# Patient Record
Sex: FEMALE | Race: WHITE | NOT HISPANIC OR LATINO | Employment: OTHER | URBAN - METROPOLITAN AREA
[De-identification: names, ages, dates, MRNs, and addresses within clinical notes are randomized per-mention and may not be internally consistent; named-entity substitution may affect disease eponyms.]

---

## 2017-01-20 ENCOUNTER — OFFICE VISIT (OUTPATIENT)
Dept: FAMILY MEDICINE CLINIC | Facility: CLINIC | Age: 67
End: 2017-01-20

## 2017-01-20 VITALS
BODY MASS INDEX: 26.04 KG/M2 | DIASTOLIC BLOOD PRESSURE: 75 MMHG | HEIGHT: 65 IN | WEIGHT: 156.3 LBS | TEMPERATURE: 97.1 F | HEART RATE: 74 BPM | SYSTOLIC BLOOD PRESSURE: 117 MMHG

## 2017-01-20 DIAGNOSIS — R35.0 URINE FREQUENCY: Primary | ICD-10-CM

## 2017-01-20 DIAGNOSIS — N30.00 ACUTE CYSTITIS WITHOUT HEMATURIA: ICD-10-CM

## 2017-01-20 LAB
BILIRUB BLD-MCNC: NEGATIVE MG/DL
CLARITY, POC: CLEAR
COLOR UR: ABNORMAL
GLUCOSE UR STRIP-MCNC: NEGATIVE MG/DL
KETONES UR QL: ABNORMAL
LEUKOCYTE EST, POC: ABNORMAL
NITRITE UR-MCNC: NEGATIVE MG/ML
PH UR: 6 [PH] (ref 5–8)
PROT UR STRIP-MCNC: NEGATIVE MG/DL
RBC # UR STRIP: ABNORMAL /UL
SP GR UR: 1.01 (ref 1–1.03)
UROBILINOGEN UR QL: NORMAL

## 2017-01-20 PROCEDURE — 81002 URINALYSIS NONAUTO W/O SCOPE: CPT | Performed by: FAMILY MEDICINE

## 2017-01-20 PROCEDURE — 99213 OFFICE O/P EST LOW 20 MIN: CPT | Performed by: FAMILY MEDICINE

## 2017-01-20 RX ORDER — SULFAMETHOXAZOLE AND TRIMETHOPRIM 800; 160 MG/1; MG/1
1 TABLET ORAL 2 TIMES DAILY
Qty: 20 TABLET | Refills: 0 | Status: SHIPPED | OUTPATIENT
Start: 2017-01-20 | End: 2017-03-03 | Stop reason: SDUPTHER

## 2017-01-20 RX ORDER — NITROFURANTOIN MACROCRYSTALS 50 MG/1
50 CAPSULE ORAL DAILY
COMMUNITY
End: 2017-05-31 | Stop reason: SDUPTHER

## 2017-01-20 RX ORDER — CIPROFLOXACIN 500 MG/1
500 TABLET, FILM COATED ORAL 2 TIMES DAILY
Qty: 20 TABLET | Refills: 0 | Status: SHIPPED | OUTPATIENT
Start: 2017-01-20 | End: 2017-03-03

## 2017-01-20 NOTE — MR AVS SNAPSHOT
Jennifer Amos   1/20/2017 3:50 PM   Office Visit    Dept Phone:  [phone]   Encounter #:  [id number]    Provider:  Dominick Arriaza Jr., MD   Department:  [facility]                Your Full Care Plan              Today's Medication Changes          These changes are accurate as of: 1/20/17  5:46 PM.  If you have any questions, ask your nurse or doctor.               New Medication(s)Ordered:     sulfamethoxazole-trimethoprim 800-160 MG per tablet   Commonly known as:  BACTRIM DS,SEPTRA DS   Take 1 tablet by mouth 2 (Two) Times a Day.   Started by:  Dominick Arriaza Jr., MD         Medication(s)that have changed:     anastrozole 1 MG tablet   Commonly known as:  ARIMIDEX   Take 1 tablet by mouth daily.   What changed:  Another medication with the same name was removed. Continue taking this medication, and follow the directions you see here.   Changed by:  Ana Pappas RN       nitrofurantoin 50 MG capsule   Commonly known as:  MACRODANTIN   Take 50 mg by mouth Daily.   What changed:  Another medication with the same name was removed. Continue taking this medication, and follow the directions you see here.   Changed by:  Dominick Arriaza Jr., MD         Stop taking medication(s)listed here:     atovaquone-proguanil 250-100 MG tablet per tablet   Commonly known as:  MALARONE   Stopped by:  Dominick Arriaza Jr., MD           diclofenac 1 % gel gel   Commonly known as:  VOLTAREN   Stopped by:  Dominick Arriaza Jr., MD           meloxicam 15 MG tablet   Commonly known as:  MOBIC   Stopped by:  Dominick Arriaza Jr., MD           nitrofurantoin (macrocrystal-monohydrate) 100 MG capsule   Commonly known as:  MACROBID   Stopped by:  Dominick Arriaza Jr., MD                Where to Get Your Medications      These medications were sent to [address] [phone]  -  [phone] FX  [address] [address]     Phone:  [phone]     ciprofloxacin 500 MG tablet    sulfamethoxazole-trimethoprim 800-160 MG per tablet                  Your Updated Medication List          This list is accurate as of: 1/20/17  5:46 PM.  Always use your most recent med list.                anastrozole 1 MG tablet   Commonly known as:  ARIMIDEX   Take 1 tablet by mouth daily.       ASPIRIN PO       Calcium Carb-Cholecalciferol 600-500 MG-UNIT capsule       CENTRUM SILVER PO       ciprofloxacin 500 MG tablet   Commonly known as:  CIPRO   Take 1 tablet by mouth 2 (Two) Times a Day.       DULoxetine 60 MG capsule   Commonly known as:  CYMBALTA   Take 1 capsule by mouth daily.       EPIPEN 2-DON 0.3 MG/0.3ML solution auto-injector injection   Generic drug:  EPINEPHrine   TAKE AS NEEDED FOR SYNCOPE OR SHORTNESS OF AIR       esomeprazole 40 MG capsule   Commonly known as:  nexIUM   Take 1 capsule by mouth every morning before breakfast.       levothyroxine 88 MCG tablet   Commonly known as:  SYNTHROID, LEVOTHROID   Take 1 tablet by mouth daily.       lovastatin 40 MG tablet   Commonly known as:  MEVACOR   Take 1 tablet by mouth every night.       nitrofurantoin 50 MG capsule   Commonly known as:  MACRODANTIN       sulfamethoxazole-trimethoprim 800-160 MG per tablet   Commonly known as:  BACTRIM DS,SEPTRA DS   Take 1 tablet by mouth 2 (Two) Times a Day.       Vitamin D3 1000 UNITS capsule               We Performed the Following     POC Urinalysis Dipstick     Urine Culture       You Were Diagnosed With        Codes Comments    Urine frequency    -  Primary ICD-10-CM: R35.0  ICD-9-CM: 788.41     Acute cystitis without hematuria     ICD-10-CM: N30.00  ICD-9-CM: 595.0       Instructions     None    Patient Instructions History      Upcoming Appointments     Visit Type Date Time Department    ACUTE           1/20/2017  3:50 PM [facility]    FOLLOW UP 1 UNIT 4/18/2017  4:40 PM [facility] ONC  "[facility]    LAB 4/18/2017  4:00 PM [facility]C CBC LAB [facility]      MyChart Signup     Our records indicate that you have an active [facility] account.    You can view your After Visit Summary by going to [employer] and logging in with your [employer] username and password.  If you don't have a [employer] username and password but a parent or guardian has access to your record, the parent or guardian should login with their own [employer] username and password and access your record to view the After Visit Summary.    If you have questions, you can email [email or web address] or call [phone] to talk to our [employer] staff.  Remember, [employer] is NOT to be used for urgent needs.  For medical emergencies, dial 911.               Other Info from Your Visit           Your Appointments     Apr 18, 2017  4:00 PM EDT   Lab with LAB CHAIR 4 [facility] ONCOLOGY CBC LAB ([place])    [address] [address]   [phone]            Apr 18, 2017  4:40 PM EDT   FOLLOW UP with Jeremi Combs MD   [facility]: CONSULTANTS IN BLOOD DISORDERS AND CANCER ([facility])    [address] [id number] [phone]              Allergies     Hydrocodone  Shortness Of Breath    Percocet [Oxycodone-acetaminophen]  Shortness Of Breath    Penicillins  Other (See Comments)    CHILDHOOD REACTION      Reason for Visit     Urinary Tract Infection frequent urination, feels like bladder isnt emptying      Vital Signs     Blood Pressure Pulse Temperature Height Weight Body Mass Index    117/75 (BP Location: Right arm, Patient Position: Sitting) 74 97.1 °F (36.2 °C) (Oral) 65\" (165.1 cm) 156 lb 4.8 oz (70.9 kg) 26.01 kg/m2    Smoking Status                   Never Smoker           Problems and Diagnoses Noted     Urinary tract infection    Urine frequency    -  Primary      Results     POC Urinalysis Dipstick      Component " Value Standard Range & Units    Color Straw Yellow, Straw, Dark Yellow, Barbara    Clarity, UA Clear Clear    Glucose, UA Negative Negative, 1000 mg/dL (3+) mg/dL    Bilirubin Negative Negative    Ketones, UA 3+ Negative    Specific Gravity  1.015 1.005 - 1.030    Blood, UA Large Negative    pH, Urine 6.0 5.0 - 8.0    Protein, POC Negative Negative mg/dL    Urobilinogen, UA Normal Normal    Leukocytes Moderate (2+) Negative    Nitrite, UA Negative Negative

## 2017-01-20 NOTE — PROGRESS NOTES
Chief Complaint   Patient presents with   • Urinary Tract Infection     frequent urination, feels like bladder isnt emptying       Subjective.../HPI  Patient present today with f/d. No u/h.    I have reviewed the patient's medical history in detail and updated the computerized patient record.    Family History   Problem Relation Age of Onset   • Breast cancer Paternal Aunt 55   • Deep vein thrombosis Other    • Heart disease Other    • Hypertension Other    • Diabetes Other    • Heart failure Mother    • Breast cancer Maternal Aunt 65   • Breast cancer Cousin        Social History     Social History   • Marital status:      Spouse name: N/A   • Number of children: 3   • Years of education: N/A     Occupational History   • Homemaker      Social History Main Topics   • Smoking status: Never Smoker   • Smokeless tobacco: Not on file   • Alcohol use No   • Drug use: No   • Sexual activity: Defer     Other Topics Concern   • Not on file     Social History Narrative       Review of Systems:   Review of Systems   Constitutional: Negative for chills, fatigue, fever and unexpected weight change.   HENT: Negative for ear pain, hearing loss, sinus pressure, sore throat and tinnitus.    Eyes: Negative for pain, discharge and redness.   Respiratory: Negative for cough, shortness of breath and wheezing.    Cardiovascular: Negative for chest pain, palpitations and leg swelling.   Gastrointestinal: Negative for abdominal pain, constipation, diarrhea and nausea.   Endocrine: Negative for cold intolerance and heat intolerance.   Genitourinary: Positive for dysuria. Negative for difficulty urinating, flank pain and urgency.   Musculoskeletal: Negative for back pain, joint swelling and myalgias.   Skin: Negative for rash and wound.   Allergic/Immunologic: Negative for environmental allergies and food allergies.   Neurological: Negative for dizziness, seizures, numbness and headaches.   Hematological: Negative for adenopathy.  "Does not bruise/bleed easily.   Psychiatric/Behavioral: Negative for decreased concentration, dysphoric mood and sleep disturbance. The patient is not nervous/anxious.    All other systems reviewed and are negative.      Objective:  Vital Signs     Vitals:    01/20/17 1638   BP: 117/75   BP Location: Right arm   Patient Position: Sitting   Pulse: 74   Temp: 97.1 °F (36.2 °C)   TempSrc: Oral   Weight: 156 lb 4.8 oz (70.9 kg)   Height: 65\" (165.1 cm)     Physical Exam   Constitutional: She is oriented to person, place, and time. She appears well-developed and well-nourished.   HENT:   Head: Normocephalic.   Eyes: Pupils are equal, round, and reactive to light.   Neck: Normal range of motion.   Cardiovascular: Normal rate and regular rhythm.    Pulmonary/Chest: Effort normal.   Abdominal: Soft.   Musculoskeletal: Normal range of motion.   Neurological: She is alert and oriented to person, place, and time.   Skin: Skin is warm and dry.        Results Review:      REVIEWED AND DISCUSSED CLINICAL RESULTS WITH PATIENT                          Current Outpatient Prescriptions:   •  anastrozole (ARIMIDEX) 1 MG chemo tablet, Take 1 tablet by mouth daily., Disp: 90 tablet, Rfl: 3  •  ASPIRIN PO, Take 81 mg by mouth daily., Disp: , Rfl:   •  Calcium Carb-Cholecalciferol 600-500 MG-UNIT capsule, Take  by mouth. HOLD PRIOR TO SURGERY, Disp: , Rfl:   •  Cholecalciferol (VITAMIN D3) 1000 UNITS capsule, Take  by mouth daily., Disp: , Rfl:   •  DULoxetine (CYMBALTA) 60 MG capsule, Take 1 capsule by mouth daily., Disp: 90 capsule, Rfl: 1  •  EPIPEN 2-DON 0.3 MG/0.3ML solution auto-injector injection, TAKE AS NEEDED FOR SYNCOPE OR SHORTNESS OF AIR, Disp: 2 each, Rfl: 0  •  esomeprazole (NexIUM) 40 MG capsule, Take 1 capsule by mouth every morning before breakfast., Disp: 90 capsule, Rfl: 1  •  levothyroxine (SYNTHROID, LEVOTHROID) 88 MCG tablet, Take 1 tablet by mouth daily., Disp: 90 tablet, Rfl: 1  •  lovastatin (MEVACOR) 40 MG " tablet, Take 1 tablet by mouth every night., Disp: 90 tablet, Rfl: 1  •  Multiple Vitamins-Minerals (CENTRUM SILVER PO), Take  by mouth., Disp: , Rfl:   •  nitrofurantoin (MACRODANTIN) 50 MG capsule, Take 50 mg by mouth Daily., Disp: , Rfl:   •  ciprofloxacin (CIPRO) 500 MG tablet, Take 1 tablet by mouth 2 (Two) Times a Day., Disp: 20 tablet, Rfl: 0  •  sulfamethoxazole-trimethoprim (BACTRIM DS,SEPTRA DS) 800-160 MG per tablet, Take 1 tablet by mouth 2 (Two) Times a Day., Disp: 20 tablet, Rfl: 0    Procedures    Assessment/Plan     Diagnoses and all orders for this visit:    Urine frequency  -     POC Urinalysis Dipstick  -     Urine Culture    Acute cystitis without hematuria  -     Urine Culture    Other orders  -     nitrofurantoin (MACRODANTIN) 50 MG capsule; Take 50 mg by mouth Daily.  -     sulfamethoxazole-trimethoprim (BACTRIM DS,SEPTRA DS) 800-160 MG per tablet; Take 1 tablet by mouth 2 (Two) Times a Day.  -     ciprofloxacin (CIPRO) 500 MG tablet; Take 1 tablet by mouth 2 (Two) Times a Day.           Dominick Arriaza Jr., MD  01/20/17  5:44 PM

## 2017-01-22 LAB
BACTERIA UR CULT: ABNORMAL
BACTERIA UR CULT: ABNORMAL
OTHER ANTIBIOTIC SUSC ISLT: ABNORMAL

## 2017-01-23 ENCOUNTER — TELEPHONE (OUTPATIENT)
Dept: FAMILY MEDICINE CLINIC | Facility: CLINIC | Age: 67
End: 2017-01-23

## 2017-01-23 NOTE — TELEPHONE ENCOUNTER
----- Message from Pat Manzanares sent at 1/23/2017  4:38 PM EST -----  [phone]  Or [phone]   lov 1/20/17  Allergic to penicillin, hydrocodone, and percocet  [facility]   Pt has a UTI and she is taking bactrim and wants the urine culture results to make sure she is on the correct antibiotic

## 2017-01-23 NOTE — TELEPHONE ENCOUNTER
Called and informed pt per Dr. POSADAS that she is taking the right medication for the uti per the results of the urine culture

## 2017-02-20 RX ORDER — ESOMEPRAZOLE MAGNESIUM 40 MG/1
CAPSULE, DELAYED RELEASE ORAL
Qty: 90 CAPSULE | Refills: 0 | Status: SHIPPED | OUTPATIENT
Start: 2017-02-20 | End: 2018-11-16 | Stop reason: ALTCHOICE

## 2017-02-20 RX ORDER — LEVOTHYROXINE SODIUM 88 UG/1
TABLET ORAL
Qty: 90 TABLET | Refills: 0 | Status: SHIPPED | OUTPATIENT
Start: 2017-02-20 | End: 2017-06-12 | Stop reason: SDUPTHER

## 2017-02-20 RX ORDER — LOVASTATIN 40 MG/1
TABLET ORAL
Qty: 90 TABLET | Refills: 0 | Status: SHIPPED | OUTPATIENT
Start: 2017-02-20 | End: 2017-06-12 | Stop reason: SDUPTHER

## 2017-03-03 ENCOUNTER — OFFICE VISIT (OUTPATIENT)
Dept: FAMILY MEDICINE CLINIC | Facility: CLINIC | Age: 67
End: 2017-03-03

## 2017-03-03 VITALS
DIASTOLIC BLOOD PRESSURE: 74 MMHG | HEIGHT: 66 IN | SYSTOLIC BLOOD PRESSURE: 114 MMHG | OXYGEN SATURATION: 99 % | TEMPERATURE: 97.5 F | WEIGHT: 161.8 LBS | BODY MASS INDEX: 26 KG/M2 | HEART RATE: 77 BPM

## 2017-03-03 DIAGNOSIS — R31.9 HEMATURIA: Primary | ICD-10-CM

## 2017-03-03 LAB
BILIRUB BLD-MCNC: NEGATIVE MG/DL
CLARITY, POC: CLEAR
COLOR UR: YELLOW
GLUCOSE UR STRIP-MCNC: NEGATIVE MG/DL
KETONES UR QL: NEGATIVE
LEUKOCYTE EST, POC: NEGATIVE
NITRITE UR-MCNC: NEGATIVE MG/ML
PH UR: 6 [PH] (ref 5–8)
PROT UR STRIP-MCNC: NEGATIVE MG/DL
RBC # UR STRIP: ABNORMAL /UL
SP GR UR: 1.03 (ref 1–1.03)
UROBILINOGEN UR QL: NORMAL

## 2017-03-03 PROCEDURE — 99213 OFFICE O/P EST LOW 20 MIN: CPT | Performed by: FAMILY MEDICINE

## 2017-03-03 PROCEDURE — 81003 URINALYSIS AUTO W/O SCOPE: CPT | Performed by: FAMILY MEDICINE

## 2017-03-03 RX ORDER — SULFAMETHOXAZOLE AND TRIMETHOPRIM 800; 160 MG/1; MG/1
1 TABLET ORAL 2 TIMES DAILY
Qty: 20 TABLET | Refills: 0 | Status: SHIPPED | OUTPATIENT
Start: 2017-03-03 | End: 2017-04-18

## 2017-03-03 NOTE — PROGRESS NOTES
"  Chief Complaint   Patient presents with   • Urinary Tract Infection     pt has beem pain in lower back  right side       Subjective.../HPI  Patient present today with right cva pain. No h/f/u/d.    I have reviewed the patient's medical history in detail and updated the computerized patient record.    Family History   Problem Relation Age of Onset   • Breast cancer Paternal Aunt 55   • Deep vein thrombosis Other    • Heart disease Other    • Hypertension Other    • Diabetes Other    • Heart failure Mother    • Breast cancer Maternal Aunt 65   • Breast cancer Cousin        Social History     Social History   • Marital status:      Spouse name: N/A   • Number of children: 3   • Years of education: N/A     Occupational History   • Homemaker      Social History Main Topics   • Smoking status: Never Smoker   • Smokeless tobacco: Not on file   • Alcohol use No   • Drug use: No   • Sexual activity: Defer     Other Topics Concern   • Not on file     Social History Narrative       Review of Systems:   Review of Systems   Constitutional: Negative.    HENT: Negative.    Eyes: Negative.    Cardiovascular: Negative.    Gastrointestinal: Negative.    Endocrine: Positive for polyuria.   Genitourinary: Negative.    Musculoskeletal: Positive for neck pain.   Allergic/Immunologic: Positive for environmental allergies.   Neurological: Negative.    Hematological: Negative.    Psychiatric/Behavioral: Positive for sleep disturbance.       Objective:  Vital Signs     Vitals:    03/03/17 1114   BP: 114/74   BP Location: Right arm   Patient Position: Sitting   Pulse: 77   Temp: 97.5 °F (36.4 °C)   TempSrc: Oral   SpO2: 99%   Weight: 161 lb 12.8 oz (73.4 kg)   Height: 66\" (167.6 cm)     Physical Exam   Constitutional: She is oriented to person, place, and time. She appears well-developed and well-nourished.   HENT:   Head: Normocephalic.   Eyes: Pupils are equal, round, and reactive to light.   Neck: Normal range of motion. "   Cardiovascular: Normal rate.    Pulmonary/Chest: Effort normal.   Abdominal: Soft.   Musculoskeletal: Normal range of motion.   Neurological: She is alert and oriented to person, place, and time.   Skin: Skin is warm and dry.        Results Review:      REVIEWED AND DISCUSSED LAB RESULTS WITH PATIENT and REVIEWED AND DISCUSSED CLINICAL RESULTS WITH PATIENT                          Current Outpatient Prescriptions:   •  anastrozole (ARIMIDEX) 1 MG chemo tablet, Take 1 tablet by mouth daily., Disp: 90 tablet, Rfl: 3  •  ASPIRIN PO, Take 81 mg by mouth daily., Disp: , Rfl:   •  Calcium Carb-Cholecalciferol 600-500 MG-UNIT capsule, Take  by mouth. HOLD PRIOR TO SURGERY, Disp: , Rfl:   •  Cholecalciferol (VITAMIN D3) 1000 UNITS capsule, Take  by mouth daily., Disp: , Rfl:   •  DULoxetine (CYMBALTA) 60 MG capsule, Take 1 capsule by mouth daily., Disp: 90 capsule, Rfl: 1  •  EPIPEN 2-DON 0.3 MG/0.3ML solution auto-injector injection, TAKE AS NEEDED FOR SYNCOPE OR SHORTNESS OF AIR, Disp: 2 each, Rfl: 0  •  esomeprazole (nexIUM) 40 MG capsule, TAKE 1 CAPSULE EVERY MORNING BEFORE BREAKFAST, Disp: 90 capsule, Rfl: 0  •  levothyroxine (SYNTHROID, LEVOTHROID) 88 MCG tablet, TAKE 1 TABLET DAILY, Disp: 90 tablet, Rfl: 0  •  lovastatin (MEVACOR) 40 MG tablet, TAKE 1 TABLET EVERY NIGHT, Disp: 90 tablet, Rfl: 0  •  Multiple Vitamins-Minerals (CENTRUM SILVER PO), Take  by mouth., Disp: , Rfl:   •  nitrofurantoin (MACRODANTIN) 50 MG capsule, Take 50 mg by mouth Daily., Disp: , Rfl:   •  sulfamethoxazole-trimethoprim (BACTRIM DS,SEPTRA DS) 800-160 MG per tablet, Take 1 tablet by mouth 2 (Two) Times a Day., Disp: 20 tablet, Rfl: 0    Procedures    Assessment/Plan     Diagnoses and all orders for this visit:    Hematuria  -     POC Urinalysis Dipstick, Automated  -     Urine Culture  -     CT Abdomen Pelvis With Contrast    Other orders  -     sulfamethoxazole-trimethoprim (BACTRIM DS,SEPTRA DS) 800-160 MG per tablet; Take 1 tablet by  mouth 2 (Two) Times a Day.       pt wants to stop cymbalta and start sertraline due to cost    Dominick Arriaza Jr., MD  03/03/17  12:51 PM

## 2017-03-05 LAB
BACTERIA UR CULT: ABNORMAL
BACTERIA UR CULT: ABNORMAL
OTHER ANTIBIOTIC SUSC ISLT: ABNORMAL

## 2017-03-07 ENCOUNTER — TELEPHONE (OUTPATIENT)
Dept: FAMILY MEDICINE CLINIC | Facility: CLINIC | Age: 67
End: 2017-03-07

## 2017-03-07 NOTE — TELEPHONE ENCOUNTER
Patient called left message to finish course of antibiotic treatment, As it is the correct antibiotic for the issue

## 2017-03-11 ENCOUNTER — OFFICE VISIT (OUTPATIENT)
Dept: FAMILY MEDICINE CLINIC | Facility: CLINIC | Age: 67
End: 2017-03-11

## 2017-03-11 VITALS
HEART RATE: 73 BPM | TEMPERATURE: 97.7 F | WEIGHT: 161 LBS | OXYGEN SATURATION: 97 % | DIASTOLIC BLOOD PRESSURE: 68 MMHG | BODY MASS INDEX: 25.88 KG/M2 | HEIGHT: 66 IN | SYSTOLIC BLOOD PRESSURE: 120 MMHG

## 2017-03-11 DIAGNOSIS — R79.89 OTHER SPECIFIED ABNORMAL FINDINGS OF BLOOD CHEMISTRY: ICD-10-CM

## 2017-03-11 DIAGNOSIS — R73.03 PREDIABETES: ICD-10-CM

## 2017-03-11 DIAGNOSIS — R93.3 ABNORMAL FINDINGS ON DIAGNOSTIC IMAGING OF OTHER PARTS OF DIGESTIVE TRACT: ICD-10-CM

## 2017-03-11 DIAGNOSIS — N30.01 ACUTE CYSTITIS WITH HEMATURIA: Primary | ICD-10-CM

## 2017-03-11 PROCEDURE — 99213 OFFICE O/P EST LOW 20 MIN: CPT | Performed by: FAMILY MEDICINE

## 2017-03-11 RX ORDER — CIPROFLOXACIN 500 MG/1
500 TABLET, FILM COATED ORAL 2 TIMES DAILY
Qty: 20 TABLET | Refills: 1 | Status: SHIPPED | OUTPATIENT
Start: 2017-03-11 | End: 2017-04-18

## 2017-03-11 NOTE — PROGRESS NOTES
Chief Complaint   Patient presents with   • Urinary Tract Infection     thinks maybe she still has uti        Subjective.../HPI  Patient present today with aching all over and was taking macrobid and bactrim together x 4 days. No f/u/d/h    I have reviewed the patient's medical history in detail and updated the computerized patient record.    Family History   Problem Relation Age of Onset   • Breast cancer Paternal Aunt 55   • Deep vein thrombosis Other    • Heart disease Other    • Hypertension Other    • Diabetes Other    • Heart failure Mother    • Breast cancer Maternal Aunt 65   • Breast cancer Cousin        Social History     Social History   • Marital status:      Spouse name: N/A   • Number of children: 3   • Years of education: N/A     Occupational History   • Homemaker      Social History Main Topics   • Smoking status: Never Smoker   • Smokeless tobacco: Not on file   • Alcohol use No   • Drug use: No   • Sexual activity: Defer     Other Topics Concern   • Not on file     Social History Narrative       Review of Systems:   Review of Systems   Constitutional: Negative for chills, fatigue, fever and unexpected weight change.   HENT: Negative for ear pain, hearing loss, sinus pressure, sore throat and tinnitus.    Eyes: Negative for pain, discharge and redness.   Respiratory: Negative for cough, shortness of breath and wheezing.    Cardiovascular: Negative for chest pain, palpitations and leg swelling.   Gastrointestinal: Negative for abdominal pain, constipation, diarrhea and nausea.   Endocrine: Negative for cold intolerance and heat intolerance.   Genitourinary: Negative for difficulty urinating, flank pain and urgency.   Musculoskeletal: Negative for back pain, joint swelling and myalgias.   Skin: Negative for rash and wound.   Allergic/Immunologic: Negative for environmental allergies and food allergies.   Neurological: Negative for dizziness, seizures, numbness and headaches.   Hematological:  "Negative for adenopathy. Does not bruise/bleed easily.   Psychiatric/Behavioral: Negative for decreased concentration, dysphoric mood and sleep disturbance. The patient is not nervous/anxious.    All other systems reviewed and are negative.      Objective:  Vital Signs     Vitals:    03/11/17 1224   BP: 120/68   BP Location: Right arm   Patient Position: Sitting   Cuff Size: Adult   Pulse: 73   Temp: 97.7 °F (36.5 °C)   TempSrc: Oral   SpO2: 97%   Weight: 161 lb (73 kg)   Height: 66\" (167.6 cm)     Physical Exam   Constitutional: She is oriented to person, place, and time. She appears well-developed and well-nourished.   HENT:   Head: Normocephalic.   Eyes: Pupils are equal, round, and reactive to light.   Neck: Normal range of motion.   Cardiovascular: Normal rate and regular rhythm.    Pulmonary/Chest: Effort normal.   Abdominal: Soft. There is tenderness (right cva pain).   Musculoskeletal: Normal range of motion.   Neurological: She is alert and oriented to person, place, and time.   Skin: Skin is warm and dry.        Results Review:      REVIEWED AND DISCUSSED LAB RESULTS WITH PATIENT and REVIEWED AND DISCUSSED CLINICAL RESULTS WITH PATIENT                          Current Outpatient Prescriptions:   •  anastrozole (ARIMIDEX) 1 MG chemo tablet, Take 1 tablet by mouth daily., Disp: 90 tablet, Rfl: 3  •  ASPIRIN PO, Take 81 mg by mouth daily., Disp: , Rfl:   •  Calcium Carb-Cholecalciferol 600-500 MG-UNIT capsule, Take  by mouth. HOLD PRIOR TO SURGERY, Disp: , Rfl:   •  Cholecalciferol (VITAMIN D3) 1000 UNITS capsule, Take  by mouth daily., Disp: , Rfl:   •  EPIPEN 2-DON 0.3 MG/0.3ML solution auto-injector injection, TAKE AS NEEDED FOR SYNCOPE OR SHORTNESS OF AIR, Disp: 2 each, Rfl: 0  •  esomeprazole (nexIUM) 40 MG capsule, TAKE 1 CAPSULE EVERY MORNING BEFORE BREAKFAST, Disp: 90 capsule, Rfl: 0  •  levothyroxine (SYNTHROID, LEVOTHROID) 88 MCG tablet, TAKE 1 TABLET DAILY, Disp: 90 tablet, Rfl: 0  •  lovastatin " (MEVACOR) 40 MG tablet, TAKE 1 TABLET EVERY NIGHT, Disp: 90 tablet, Rfl: 0  •  Multiple Vitamins-Minerals (CENTRUM SILVER PO), Take  by mouth., Disp: , Rfl:   •  nitrofurantoin (MACRODANTIN) 50 MG capsule, Take 50 mg by mouth Daily., Disp: , Rfl:   •  sertraline (ZOLOFT) 50 MG tablet, Take 1 tablet by mouth Daily., Disp: 90 tablet, Rfl: 1  •  sulfamethoxazole-trimethoprim (BACTRIM DS,SEPTRA DS) 800-160 MG per tablet, Take 1 tablet by mouth 2 (Two) Times a Day., Disp: 20 tablet, Rfl: 0  •  ciprofloxacin (CIPRO) 500 MG tablet, Take 1 tablet by mouth 2 (Two) Times a Day., Disp: 20 tablet, Rfl: 1    Procedures    Assessment/Plan     Diagnoses and all orders for this visit:    Acute cystitis with hematuria  -     Ambulatory Referral to Urology  -     CBC & Differential  -     Comprehensive Metabolic Panel  -     Lipid Panel With LDL / HDL Ratio  -     Hemoglobin A1c  -     Thyroid Panel With TSH    Abnormal findings on diagnostic imaging of other parts of digestive tract   -     Lipid Panel With LDL / HDL Ratio    Other specified abnormal findings of blood chemistry   -     Hemoglobin A1c    Prediabetes   -     Thyroid Panel With TSH    Other orders  -     ciprofloxacin (CIPRO) 500 MG tablet; Take 1 tablet by mouth 2 (Two) Times a Day.         Dominick Arriaza Jr., MD  03/11/17  1:44 PM

## 2017-04-04 ENCOUNTER — HOSPITAL ENCOUNTER (OUTPATIENT)
Dept: CT IMAGING | Facility: HOSPITAL | Age: 67
Discharge: HOME | End: 2017-04-04
Attending: FAMILY MEDICINE | Admitting: FAMILY MEDICINE

## 2017-04-04 LAB — CREAT BLDA-MCNC: 0.7 MG/DL (ref 0.6–1.3)

## 2017-04-04 PROCEDURE — 82565 ASSAY OF CREATININE: CPT

## 2017-04-04 PROCEDURE — 74177 CT ABD & PELVIS W/CONTRAST: CPT

## 2017-04-04 PROCEDURE — 0 IOPAMIDOL PER 1 ML: Performed by: FAMILY MEDICINE

## 2017-04-04 PROCEDURE — 74178 CT ABD&PLV WO CNTR FLWD CNTR: CPT

## 2017-04-04 RX ADMIN — IOPAMIDOL 85 ML: 755 INJECTION, SOLUTION INTRAVENOUS at 12:05

## 2017-04-04 NOTE — NURSING NOTE
Patient brought to triage with hives on neck and going down back following CT contrast.  Dr. Caba to look at patient.

## 2017-04-06 ENCOUNTER — LAB REQUISITION (OUTPATIENT)
Dept: LAB | Facility: HOSPITAL | Age: 67
End: 2017-04-06

## 2017-04-06 DIAGNOSIS — N39.0 URINARY TRACT INFECTION: ICD-10-CM

## 2017-04-06 PROCEDURE — 87086 URINE CULTURE/COLONY COUNT: CPT | Performed by: UROLOGY

## 2017-04-08 LAB — BACTERIA SPEC AEROBE CULT: NO GROWTH

## 2017-04-10 RX ORDER — DULOXETIN HYDROCHLORIDE 60 MG/1
CAPSULE, DELAYED RELEASE ORAL
Qty: 90 CAPSULE | Refills: 1 | Status: SHIPPED | OUTPATIENT
Start: 2017-04-10 | End: 2023-01-09

## 2017-04-10 RX ORDER — DULOXETIN HYDROCHLORIDE 60 MG/1
60 CAPSULE, DELAYED RELEASE ORAL DAILY
Qty: 30 CAPSULE | Refills: 6 | Status: SHIPPED | OUTPATIENT
Start: 2017-04-10 | End: 2017-04-10 | Stop reason: SDUPTHER

## 2017-04-10 RX ORDER — DULOXETIN HYDROCHLORIDE 60 MG/1
60 CAPSULE, DELAYED RELEASE ORAL DAILY
Qty: 30 CAPSULE | Refills: 6 | Status: SHIPPED | OUTPATIENT
Start: 2017-04-10 | End: 2017-04-18

## 2017-04-14 ENCOUNTER — OFFICE VISIT (OUTPATIENT)
Dept: ORTHOPEDIC SURGERY | Facility: CLINIC | Age: 67
End: 2017-04-14

## 2017-04-14 VITALS — WEIGHT: 160 LBS | HEIGHT: 66 IN | BODY MASS INDEX: 25.71 KG/M2 | TEMPERATURE: 98.1 F

## 2017-04-14 DIAGNOSIS — Z96.652 S/P LEFT UNICOMPARTMENTAL KNEE REPLACEMENT: Primary | ICD-10-CM

## 2017-04-14 PROCEDURE — 73562 X-RAY EXAM OF KNEE 3: CPT | Performed by: ORTHOPAEDIC SURGERY

## 2017-04-14 PROCEDURE — 99213 OFFICE O/P EST LOW 20 MIN: CPT | Performed by: ORTHOPAEDIC SURGERY

## 2017-04-14 NOTE — PROGRESS NOTES
Patient: Jennifer Amos  YOB: 1950 66 y.o. female  Medical Record Number: [id number]    Chief Complaints:   Chief Complaint   Patient presents with   • Left Knee - Follow-up       History of Present Illness:Jennifer Amos is a 66 y.o. female who presents for follow-up of  Both knees.  1 year out from left unicompartmental knee replacement about 6 years out from right total knee she has no complaints with knees are functioning well.    Allergies:   Allergies   Allergen Reactions   • Hydrocodone Shortness Of Breath   • Percocet [Oxycodone-Acetaminophen] Shortness Of Breath   • Penicillins Other (See Comments)     CHILDHOOD REACTION       Medications:   Current Outpatient Prescriptions   Medication Sig Dispense Refill   • anastrozole (ARIMIDEX) 1 MG chemo tablet Take 1 tablet by mouth daily. 90 tablet 3   • ASPIRIN PO Take 81 mg by mouth daily.     • Calcium Carb-Cholecalciferol 600-500 MG-UNIT capsule Take  by mouth. HOLD PRIOR TO SURGERY     • Cholecalciferol (VITAMIN D3) 1000 UNITS capsule Take  by mouth daily.     • ciprofloxacin (CIPRO) 500 MG tablet Take 1 tablet by mouth 2 (Two) Times a Day. 20 tablet 1   • DULoxetine (CYMBALTA) 60 MG capsule Take 1 capsule by mouth Daily. 30 capsule 6   • DULoxetine (CYMBALTA) 60 MG capsule TAKE 1 CAPSULE BY MOUTH DAILY 90 capsule 1   • EPIPEN 2-DON 0.3 MG/0.3ML solution auto-injector injection TAKE AS NEEDED FOR SYNCOPE OR SHORTNESS OF AIR 2 each 0   • esomeprazole (nexIUM) 40 MG capsule TAKE 1 CAPSULE EVERY MORNING BEFORE BREAKFAST 90 capsule 0   • levothyroxine (SYNTHROID, LEVOTHROID) 88 MCG tablet TAKE 1 TABLET DAILY 90 tablet 0   • lovastatin (MEVACOR) 40 MG tablet TAKE 1 TABLET EVERY NIGHT 90 tablet 0   • Multiple Vitamins-Minerals (CENTRUM SILVER PO) Take  by mouth.     • nitrofurantoin (MACRODANTIN) 50 MG capsule Take 50 mg by mouth Daily.     • sertraline (ZOLOFT) 50 MG tablet Take 1 tablet by mouth Daily. 90 tablet 1   •  "sulfamethoxazole-trimethoprim (BACTRIM DS,SEPTRA DS) 800-160 MG per tablet Take 1 tablet by mouth 2 (Two) Times a Day. 20 tablet 0     No current facility-administered medications for this visit.          The following portions of the patient's history were reviewed and updated as appropriate: allergies, current medications, past family history, past medical history, past social history, past surgical history and problem list.    Review of Systems:   A 14 point review of systems was performed. All systems negative except pertinent positives/negative listed in HPI above    Physical Exam:   Vitals:    04/14/17 1505   Temp: 98.1 °F (36.7 °C)   Weight: 160 lb (72.6 kg)   Height: 66\" (167.6 cm)       General: A and O x 3, ASA, NAD    SCLERA:    Normal    DENTITION:   Normal  Both knee show excellent range of motion no effusion no instability the patella tracked midline.  Good quad strength.  Incisions are well-healed.    Radiology:  Xrays 3views left knee (ap,lateral, sunrise) were ordered and reviewed for evaluation of knee pain demonstrating a well positioned uni knee replacement without evidence of wear, loosening or osteolysis  todays xrays were compared to previous xrays and demonstrate no change    Assessment/Plan:  Left knee unicompartmental replacement, right total knee replacement both doing well I will see her back on a when necessary basis.  "

## 2017-04-18 ENCOUNTER — OFFICE VISIT (OUTPATIENT)
Dept: ONCOLOGY | Facility: CLINIC | Age: 67
End: 2017-04-18

## 2017-04-18 ENCOUNTER — LAB (OUTPATIENT)
Dept: LAB | Facility: HOSPITAL | Age: 67
End: 2017-04-18

## 2017-04-18 VITALS
OXYGEN SATURATION: 96 % | BODY MASS INDEX: 26.65 KG/M2 | HEIGHT: 66 IN | TEMPERATURE: 98.1 F | HEART RATE: 99 BPM | SYSTOLIC BLOOD PRESSURE: 110 MMHG | WEIGHT: 165.8 LBS | DIASTOLIC BLOOD PRESSURE: 60 MMHG | RESPIRATION RATE: 16 BRPM

## 2017-04-18 DIAGNOSIS — Z85.3 HISTORY OF BREAST CANCER: Primary | ICD-10-CM

## 2017-04-18 DIAGNOSIS — Z12.31 ENCOUNTER FOR SCREENING MAMMOGRAM FOR MALIGNANT NEOPLASM OF BREAST: ICD-10-CM

## 2017-04-18 LAB
BASOPHILS # BLD AUTO: 0.05 10*3/MM3 (ref 0–0.1)
BASOPHILS NFR BLD AUTO: 0.6 % (ref 0–1.1)
DEPRECATED RDW RBC AUTO: 47.3 FL (ref 37–49)
EOSINOPHIL # BLD AUTO: 0.16 10*3/MM3 (ref 0–0.36)
EOSINOPHIL NFR BLD AUTO: 2 % (ref 1–5)
ERYTHROCYTE [DISTWIDTH] IN BLOOD BY AUTOMATED COUNT: 15.4 % (ref 11.7–14.5)
HCT VFR BLD AUTO: 37.4 % (ref 34–45)
HGB BLD-MCNC: 12.4 G/DL (ref 11.5–14.9)
IMM GRANULOCYTES # BLD: 0.03 10*3/MM3 (ref 0–0.03)
IMM GRANULOCYTES NFR BLD: 0.4 % (ref 0–0.5)
LYMPHOCYTES # BLD AUTO: 2.62 10*3/MM3 (ref 1–3.5)
LYMPHOCYTES NFR BLD AUTO: 33.5 % (ref 20–49)
MCH RBC QN AUTO: 28.2 PG (ref 27–33)
MCHC RBC AUTO-ENTMCNC: 33.2 G/DL (ref 32–35)
MCV RBC AUTO: 85 FL (ref 83–97)
MONOCYTES # BLD AUTO: 0.59 10*3/MM3 (ref 0.25–0.8)
MONOCYTES NFR BLD AUTO: 7.5 % (ref 4–12)
NEUTROPHILS # BLD AUTO: 4.38 10*3/MM3 (ref 1.5–7)
NEUTROPHILS NFR BLD AUTO: 56 % (ref 39–75)
NRBC BLD MANUAL-RTO: 0 /100 WBC (ref 0–0)
PLATELET # BLD AUTO: 181 10*3/MM3 (ref 150–375)
PMV BLD AUTO: 11.9 FL (ref 8.9–12.1)
RBC # BLD AUTO: 4.4 10*6/MM3 (ref 3.9–5)
WBC NRBC COR # BLD: 7.83 10*3/MM3 (ref 4–10)

## 2017-04-18 PROCEDURE — 99213 OFFICE O/P EST LOW 20 MIN: CPT | Performed by: INTERNAL MEDICINE

## 2017-04-18 PROCEDURE — 85025 COMPLETE CBC W/AUTO DIFF WBC: CPT

## 2017-04-18 PROCEDURE — 36416 COLLJ CAPILLARY BLOOD SPEC: CPT

## 2017-04-18 NOTE — PROGRESS NOTES
[facility] GROUP OUTPATIENT CLINIC FOLLOW UP VISIT    REASON FOR FOLLOW-UP:    1.  History of stage I a hormone receptor positive HER-2 negative invasive mammary carcinoma of the lower inner quadrant of the left breast.  She had a left mastectomy with TRAM flap reconstruction.  2.  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  3.  Anastrozole 1 mg daily was initiated on 10/16/2013.  4.  History of fat necrosis above the left breast in November 2013.    HISTORY OF PRESENT ILLNESS:  Jennifer Amos is a 66 y.o. female who returns today for follow up of the above issue.  She overall is doing well.  She tolerates the anastrozole well without significant adverse effects.  She does get frequent urinary tract infections in spite of prophylactic antibiotics.  She saw a urologist with no good explanation for some hematuria.  No new problems with her breasts.    PAST MEDICAL, SURGICAL, FAMILY, AND SOCIAL HISTORIES WERE REVIEWED WITH THE PATIENT AND IN THE ELECTRONIC MEDICAL RECORD, AND WERE UPDATED IF INDICATED.    ALLERGIES:  Allergies   Allergen Reactions   • Hydrocodone Shortness Of Breath   • Percocet [Oxycodone-Acetaminophen] Shortness Of Breath   • Penicillins Other (See Comments)     CHILDHOOD REACTION       MEDICATIONS:  The medication list has been reviewed with the patient by the medical assistant, and the list has been updated in the electronic medical record, which I reviewed.  Medication dosages and frequencies were confirmed to be accurate.    REVIEW OF SYSTEMS:  PAIN:  See Vital Signs below.  GENERAL:  No fevers, chills, night sweats, or unintended weight loss.  Fatigue which is mild and stable.  Insomnia.  SKIN:  Skin tags scattered on her back and beneath both breasts  HEME/LYMPH:  No abnormal bleeding.  No palpable lymphadenopathy.  EYES:  No vision changes or diplopia.  ENT:  No sore throat or difficulty swallowing.  RESPIRATORY:  No cough, shortness of breath, hemoptysis, or  "wheezing.  CARDIOVASCULAR:  No chest pain, palpitations, orthopnea, or dyspnea on exertion.  GASTROINTESTINAL:  No abdominal pain, nausea, vomiting, constipation, diarrhea, melena, or hematochezia.  GENITOURINARY:  See history of present illness  MUSCULOSKELETAL:  No joint pain, swelling, or erythema.  NEUROLOGIC:  No dizziness, loss of consciousness, or seizures.  PSYCHIATRIC:  No depression, anxiety, or mood changes.    Vitals:    04/18/17 1632   BP: 110/60   Pulse: 99   Resp: 16   Temp: 98.1 °F (36.7 °C)   TempSrc: Oral   SpO2: 96%   Weight: 165 lb 12.8 oz (75.2 kg)   Height: 65.71\" (166.9 cm)   PainSc: 0-No pain       PHYSICAL EXAMINATION:  GENERAL:  Well-developed well-nourished female; awake, alert and oriented, in no acute distress.  SKIN:  Warm and dry, without rashes, purpura, or petechiae.  Several small skin tags measuring no more than a couple of millimeters are present on her back and both breasts.   HEAD:  Normocephalic, atraumatic.  EYES:  Pupils equal, round and reactive to light.  Extraocular movements intact.  Conjunctivae normal.  EARS:  Hearing intact.  NOSE:  Septum midline.  No excoriations or nasal discharge.  MOUTH:  No stomatitis or ulcers.  Lips are normal.  THROAT:  Oropharynx without lesions or exudates.  NECK:  Supple with good range of motion; no thyromegaly or masses; no JVD or bruits.  LYMPHATICS:  No cervical, supraclavicular, axillary, or inguinal lymphadenopathy.  CHEST:  Lungs are clear to auscultation bilaterally.  No wheezes, rales, or rhonchi.  BREASTS:  Both breasts were examined today.  Left TRAM flap.  Stable post-surgical changes.  Incisions are well-healed.  No axillary lymphadenopathy.    HEART:  Regular rate; normal rhythm.  No murmurs, gallops or rubs.  ABDOMEN:  Soft, non-tender, non-distended.  Normal active bowel sounds.  No organomegaly.  EXTREMITIES:  No clubbing, cyanosis, or edema.  NEUROLOGICAL:  No focal neurologic deficits.    DIAGNOSTIC DATA:  Lab Results "   Component Value Date    WBC 7.83 04/18/2017    HGB 12.4 04/18/2017    HCT 37.4 04/18/2017    MCV 85.0 04/18/2017     04/18/2017     IMAGING:     Mammogram on 6/16/2016:  IMPRESSION:  There are no findings suspicious for malignancy in the right  breast or left TRAM flap. Routine follow-up mammography is recommended.  BIRADS category 2: Benign.    CT imaging of the abdomen and pelvis from 4/5/2017 done for hematuria shows no evidence for kidney stone or renal mass.  No findings to explain hematuria.  Otherwise no acute process.    ASSESSMENT:  This is a 66 y.o. female with:  1.  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  2.  History of stage I hormone receptor positive HER-2 negative invasive mammary carcinoma of the lower inner quadrant of the left breast.  She had a left mastectomy with TRAM flap reconstruction.  Anastrozole 1 mg daily was initiated October 2013.  She had a very low Oncotype DX recurrence score.  3.  History of fat necrosis of the left breast  4.  Osteopenia:  She gets annual Reclast which is prescribed by Dr. Arriaza.  This was last administered in May 2016.  5.  Fatigue and alopecia likely related to anastrazole.  6.  Frequent urinary tract infections: She may need to urogynecology.  I will see if I can find somebody for her to see.    PLAN:   1.  Continue anastrozole 1mg daily with plans to continue this through October 2018.  2.  I will see her back in 6 months for follow-up with a clinical breast exam and a CBC that day.  3.  Mammogram in June.  This was ordered today.

## 2017-04-19 ENCOUNTER — TELEPHONE (OUTPATIENT)
Dept: ONCOLOGY | Facility: CLINIC | Age: 67
End: 2017-04-19

## 2017-04-19 NOTE — TELEPHONE ENCOUNTER
I spoke with her on the phone today about 2 possible urogynecologists she could see.  One is Dr. Melany Baires.  The other is Dr. Briseyda Ocasio.  She will look them up and let us know if she needs a referral.

## 2017-04-20 ENCOUNTER — TELEPHONE (OUTPATIENT)
Dept: SURGERY | Facility: CLINIC | Age: 67
End: 2017-04-20

## 2017-04-20 NOTE — TELEPHONE ENCOUNTER
Note from Dr. Mamadou Combs April 18, 2017.  Continue Arimidex 1 mg daily through October 2018.  See him back in 6 months.

## 2017-04-24 ENCOUNTER — OFFICE VISIT (OUTPATIENT)
Dept: FAMILY MEDICINE CLINIC | Facility: CLINIC | Age: 67
End: 2017-04-24

## 2017-04-24 VITALS
WEIGHT: 164.6 LBS | SYSTOLIC BLOOD PRESSURE: 124 MMHG | TEMPERATURE: 98.2 F | HEIGHT: 66 IN | BODY MASS INDEX: 26.45 KG/M2 | DIASTOLIC BLOOD PRESSURE: 68 MMHG

## 2017-04-24 DIAGNOSIS — M85.80 OSTEOPENIA DETERMINED BY X-RAY: ICD-10-CM

## 2017-04-24 DIAGNOSIS — N30.00 ACUTE CYSTITIS WITHOUT HEMATURIA: Primary | ICD-10-CM

## 2017-04-24 DIAGNOSIS — R93.3 ABNORMAL FINDINGS ON DIAGNOSTIC IMAGING OF OTHER PARTS OF DIGESTIVE TRACT: ICD-10-CM

## 2017-04-24 DIAGNOSIS — R94.6 ABNORMAL RESULTS OF THYROID FUNCTION STUDIES: ICD-10-CM

## 2017-04-24 DIAGNOSIS — R79.89 OTHER SPECIFIED ABNORMAL FINDINGS OF BLOOD CHEMISTRY: ICD-10-CM

## 2017-04-24 DIAGNOSIS — R10.9 FLANK PAIN: ICD-10-CM

## 2017-04-24 LAB
BILIRUB BLD-MCNC: NEGATIVE MG/DL
CLARITY, POC: CLEAR
COLOR UR: YELLOW
GLUCOSE UR STRIP-MCNC: NEGATIVE MG/DL
KETONES UR QL: NEGATIVE
LEUKOCYTE EST, POC: NEGATIVE
NITRITE UR-MCNC: NEGATIVE MG/ML
PH UR: 6 [PH] (ref 5–8)
PROT UR STRIP-MCNC: NEGATIVE MG/DL
RBC # UR STRIP: NEGATIVE /UL
SP GR UR: 1.02 (ref 1–1.03)
UROBILINOGEN UR QL: NORMAL

## 2017-04-24 PROCEDURE — 81003 URINALYSIS AUTO W/O SCOPE: CPT | Performed by: FAMILY MEDICINE

## 2017-04-24 PROCEDURE — 99213 OFFICE O/P EST LOW 20 MIN: CPT | Performed by: FAMILY MEDICINE

## 2017-04-24 RX ORDER — DOXYCYCLINE 100 MG/1
100 CAPSULE ORAL 2 TIMES DAILY
Qty: 20 CAPSULE | Refills: 0 | Status: SHIPPED | OUTPATIENT
Start: 2017-04-24 | End: 2017-08-25

## 2017-04-24 RX ORDER — ZOLEDRONIC ACID 5 MG/100ML
5 INJECTION, SOLUTION INTRAVENOUS ONCE
Qty: 100 ML | Refills: 0 | Status: SHIPPED | OUTPATIENT
Start: 2017-04-24 | End: 2017-04-24

## 2017-04-24 NOTE — PROGRESS NOTES
Chief Complaint   Patient presents with   • Back Pain     lower back pain, frequency of urination, x 2 days       Subjective.../HPI  Patient present today with d on left>right / f  No h /u. Symptoms x 2 days. Takes macrodantin as a preventative.straight cath last week by dr lópez.  I have reviewed the patient's medical history in detail and updated the computerized patient record.    Family History   Problem Relation Age of Onset   • Breast cancer Paternal Aunt 55   • Deep vein thrombosis Other    • Heart disease Other    • Hypertension Other    • Diabetes Other    • Heart failure Mother    • Breast cancer Maternal Aunt 65   • Breast cancer Cousin        Social History     Social History   • Marital status:      Spouse name: N/A   • Number of children: 3   • Years of education: N/A     Occupational History   • Homemaker      Social History Main Topics   • Smoking status: Never Smoker   • Smokeless tobacco: Not on file   • Alcohol use No   • Drug use: No   • Sexual activity: Defer     Other Topics Concern   • Not on file     Social History Narrative       Review of Systems:   Review of Systems   Constitutional: Negative for chills, fatigue, fever and unexpected weight change.   HENT: Negative for ear pain, hearing loss, sinus pressure, sore throat and tinnitus.    Eyes: Negative for pain, discharge and redness.   Respiratory: Negative for cough, shortness of breath and wheezing.    Cardiovascular: Negative for chest pain, palpitations and leg swelling.   Gastrointestinal: Negative for abdominal pain, constipation, diarrhea and nausea.   Endocrine: Negative for cold intolerance and heat intolerance.   Genitourinary: Positive for frequency. Negative for difficulty urinating, dysuria, flank pain and urgency.   Musculoskeletal: Negative for back pain, joint swelling and myalgias.   Skin: Negative for rash and wound.   Allergic/Immunologic: Negative for environmental allergies and food allergies.   Neurological:  "Negative for dizziness, seizures, numbness and headaches.   Hematological: Negative for adenopathy. Does not bruise/bleed easily.   Psychiatric/Behavioral: Negative for decreased concentration, dysphoric mood and sleep disturbance. The patient is not nervous/anxious.    All other systems reviewed and are negative.      Objective:  Vital Signs     Vitals:    04/24/17 1349   BP: 124/68   BP Location: Right arm   Patient Position: Sitting   Temp: 98.2 °F (36.8 °C)   TempSrc: Oral   Weight: 164 lb 9.6 oz (74.7 kg)   Height: 65.71\" (166.9 cm)     Physical Exam   Constitutional: She is oriented to person, place, and time. She appears well-developed and well-nourished.   HENT:   Head: Normocephalic.   Eyes: Pupils are equal, round, and reactive to light.   Neck: Normal range of motion. Neck supple.   Cardiovascular: Normal rate and regular rhythm.    Pulmonary/Chest: Effort normal.   Abdominal: Soft.   Musculoskeletal: Normal range of motion.   Neurological: She is alert and oriented to person, place, and time.   Skin: Skin is warm and dry.        Results Review:      REVIEWED AND DISCUSSED CLINICAL RESULTS WITH PATIENT      Results from last 7 days  Lab Units 04/25/17  1504   WBC x10E3/uL 6.2   HEMOGLOBIN g/dL 12.3   HEMATOCRIT % 36.9   PLATELETS x10E3/uL 225       Results from last 7 days  Lab Units 04/25/17  1504   SODIUM mmol/L 141   POTASSIUM mmol/L 4.1   CHLORIDE mmol/L 100   TOTAL CO2, ARTERIAL mmol/L 23   BUN mg/dL 22   CREATININE mg/dL 0.92   CALCIUM mg/dL 9.6                   Current Outpatient Prescriptions:   •  anastrozole (ARIMIDEX) 1 MG chemo tablet, Take 1 tablet by mouth daily., Disp: 90 tablet, Rfl: 3  •  ASPIRIN PO, Take 81 mg by mouth daily., Disp: , Rfl:   •  Calcium Carb-Cholecalciferol 600-500 MG-UNIT capsule, Take  by mouth. HOLD PRIOR TO SURGERY, Disp: , Rfl:   •  Cholecalciferol (VITAMIN D3) 1000 UNITS capsule, Take  by mouth daily., Disp: , Rfl:   •  DULoxetine (CYMBALTA) 60 MG capsule, TAKE 1 " CAPSULE BY MOUTH DAILY, Disp: 90 capsule, Rfl: 1  •  EPIPEN 2-DON 0.3 MG/0.3ML solution auto-injector injection, TAKE AS NEEDED FOR SYNCOPE OR SHORTNESS OF AIR, Disp: 2 each, Rfl: 0  •  esomeprazole (nexIUM) 40 MG capsule, TAKE 1 CAPSULE EVERY MORNING BEFORE BREAKFAST, Disp: 90 capsule, Rfl: 0  •  levothyroxine (SYNTHROID, LEVOTHROID) 88 MCG tablet, TAKE 1 TABLET DAILY, Disp: 90 tablet, Rfl: 0  •  lovastatin (MEVACOR) 40 MG tablet, TAKE 1 TABLET EVERY NIGHT, Disp: 90 tablet, Rfl: 0  •  Multiple Vitamins-Minerals (CENTRUM SILVER PO), Take  by mouth., Disp: , Rfl:   •  nitrofurantoin (MACRODANTIN) 50 MG capsule, Take 50 mg by mouth Daily., Disp: , Rfl:   •  doxycycline (MONODOX) 100 MG capsule, Take 1 capsule by mouth 2 (Two) Times a Day., Disp: 20 capsule, Rfl: 0    Procedures    Assessment/Plan     Diagnoses and all orders for this visit:    Acute cystitis without hematuria  -     POCT urinalysis dipstick, automated  -     Urine Culture  -     CBC & Differential  -     Comprehensive Metabolic Panel  -     Hemoglobin A1c  -     Lipid Panel With LDL / HDL Ratio  -     Thyroid Panel With TSH    Flank pain  -     POCT urinalysis dipstick, automated  -     Urine Culture  -     CBC & Differential  -     Comprehensive Metabolic Panel  -     Hemoglobin A1c  -     Lipid Panel With LDL / HDL Ratio  -     Thyroid Panel With TSH  -     Urine culture (clean catch)    Osteopenia determined by x-ray  -     Urine Culture  -     CBC & Differential  -     Comprehensive Metabolic Panel  -     Hemoglobin A1c  -     Lipid Panel With LDL / HDL Ratio  -     Thyroid Panel With TSH    Other specified abnormal findings of blood chemistry   -     Hemoglobin A1c    Abnormal findings on diagnostic imaging of other parts of digestive tract   -     Lipid Panel With LDL / HDL Ratio    Abnormal results of thyroid function studies   -     Thyroid Panel With TSH    Other orders  -     doxycycline (MONODOX) 100 MG capsule; Take 1 capsule by mouth 2  (Two) Times a Day.  -     zoledronic acid (RECLAST) 5 MG/100ML solution infusion; Infuse 100 mL into a venous catheter 1 (One) Time for 1 dose. Out of town till 5/7/17           Dominick Arriaza Jr., MD  04/27/17  5:35 PM

## 2017-04-26 LAB
BACTERIA UR CULT: NORMAL
BACTERIA UR CULT: NORMAL

## 2017-04-27 LAB
ALBUMIN SERPL-MCNC: 4.7 G/DL (ref 3.6–4.8)
ALBUMIN/GLOB SERPL: 1.9 {RATIO} (ref 1.2–2.2)
ALP SERPL-CCNC: 66 IU/L (ref 39–117)
ALT SERPL-CCNC: 18 IU/L (ref 0–32)
AST SERPL-CCNC: 27 IU/L (ref 0–40)
BACTERIA UR CULT: NO GROWTH
BACTERIA UR CULT: NORMAL
BASOPHILS # BLD AUTO: 0.1 X10E3/UL (ref 0–0.2)
BASOPHILS NFR BLD AUTO: 1 %
BILIRUB SERPL-MCNC: 0.9 MG/DL (ref 0–1.2)
BUN SERPL-MCNC: 22 MG/DL (ref 8–27)
BUN/CREAT SERPL: 24 (ref 12–28)
CALCIUM SERPL-MCNC: 9.6 MG/DL (ref 8.7–10.3)
CHLORIDE SERPL-SCNC: 100 MMOL/L (ref 96–106)
CHOLEST SERPL-MCNC: 193 MG/DL (ref 100–199)
CO2 SERPL-SCNC: 23 MMOL/L (ref 18–29)
CREAT SERPL-MCNC: 0.92 MG/DL (ref 0.57–1)
EOSINOPHIL # BLD AUTO: 0.1 X10E3/UL (ref 0–0.4)
EOSINOPHIL NFR BLD AUTO: 2 %
ERYTHROCYTE [DISTWIDTH] IN BLOOD BY AUTOMATED COUNT: 16.1 % (ref 12.3–15.4)
FT4I SERPL CALC-MCNC: 1.9 (ref 1.2–4.9)
GLOBULIN SER CALC-MCNC: 2.5 G/DL (ref 1.5–4.5)
GLUCOSE SERPL-MCNC: 90 MG/DL (ref 65–99)
HBA1C MFR BLD: 5.9 % (ref 4.8–5.6)
HCT VFR BLD AUTO: 36.9 % (ref 34–46.6)
HDLC SERPL-MCNC: 75 MG/DL
HGB BLD-MCNC: 12.3 G/DL (ref 11.1–15.9)
IMM GRANULOCYTES # BLD: 0 X10E3/UL (ref 0–0.1)
IMM GRANULOCYTES NFR BLD: 0 %
LDLC SERPL CALC-MCNC: 103 MG/DL (ref 0–99)
LDLC/HDLC SERPL: 1.4 RATIO UNITS (ref 0–3.2)
LYMPHOCYTES # BLD AUTO: 2.5 X10E3/UL (ref 0.7–3.1)
LYMPHOCYTES NFR BLD AUTO: 40 %
MCH RBC QN AUTO: 27.9 PG (ref 26.6–33)
MCHC RBC AUTO-ENTMCNC: 33.3 G/DL (ref 31.5–35.7)
MCV RBC AUTO: 84 FL (ref 79–97)
MONOCYTES # BLD AUTO: 0.4 X10E3/UL (ref 0.1–0.9)
MONOCYTES NFR BLD AUTO: 6 %
NEUTROPHILS # BLD AUTO: 3.2 X10E3/UL (ref 1.4–7)
NEUTROPHILS NFR BLD AUTO: 51 %
PLATELET # BLD AUTO: 225 X10E3/UL (ref 150–379)
POTASSIUM SERPL-SCNC: 4.1 MMOL/L (ref 3.5–5.2)
PROT SERPL-MCNC: 7.2 G/DL (ref 6–8.5)
RBC # BLD AUTO: 4.41 X10E6/UL (ref 3.77–5.28)
SODIUM SERPL-SCNC: 141 MMOL/L (ref 134–144)
T3RU NFR SERPL: 25 % (ref 24–39)
T4 SERPL-MCNC: 7.6 UG/DL (ref 4.5–12)
TRIGL SERPL-MCNC: 77 MG/DL (ref 0–149)
TSH SERPL DL<=0.005 MIU/L-ACNC: 5.16 UIU/ML (ref 0.45–4.5)
VLDLC SERPL CALC-MCNC: 15 MG/DL (ref 5–40)
WBC # BLD AUTO: 6.2 X10E3/UL (ref 3.4–10.8)

## 2017-05-02 DIAGNOSIS — M85.80 OSTEOPENIA: Primary | ICD-10-CM

## 2017-05-02 PROBLEM — M81.0 OSTEOPOROSIS: Status: ACTIVE | Noted: 2017-05-02

## 2017-05-02 RX ORDER — SODIUM CHLORIDE 9 MG/ML
250 INJECTION, SOLUTION INTRAVENOUS ONCE
OUTPATIENT
Start: 2017-05-02

## 2017-05-11 DIAGNOSIS — M85.80 OSTEOPENIA: Primary | ICD-10-CM

## 2017-05-25 ENCOUNTER — TRANSCRIBE ORDERS (OUTPATIENT)
Dept: ADMINISTRATIVE | Facility: HOSPITAL | Age: 67
End: 2017-05-25

## 2017-05-25 DIAGNOSIS — Z12.31 VISIT FOR SCREENING MAMMOGRAM: Primary | ICD-10-CM

## 2017-05-31 RX ORDER — NITROFURANTOIN MACROCRYSTALS 50 MG/1
50 CAPSULE ORAL DAILY
Qty: 90 CAPSULE | Refills: 0 | Status: SHIPPED | OUTPATIENT
Start: 2017-05-31 | End: 2017-06-14

## 2017-05-31 RX ORDER — ANASTROZOLE 1 MG/1
1 TABLET ORAL DAILY
Qty: 90 TABLET | Refills: 3 | Status: SHIPPED | OUTPATIENT
Start: 2017-05-31 | End: 2018-11-14

## 2017-06-12 RX ORDER — LOVASTATIN 40 MG/1
40 TABLET ORAL NIGHTLY
Qty: 90 TABLET | Refills: 0 | Status: SHIPPED | OUTPATIENT
Start: 2017-06-12 | End: 2017-08-28 | Stop reason: SDUPTHER

## 2017-06-12 RX ORDER — LEVOTHYROXINE SODIUM 88 UG/1
88 TABLET ORAL DAILY
Qty: 90 TABLET | Refills: 0 | Status: SHIPPED | OUTPATIENT
Start: 2017-06-12 | End: 2017-08-28 | Stop reason: SDUPTHER

## 2017-06-14 ENCOUNTER — OFFICE VISIT (OUTPATIENT)
Dept: FAMILY MEDICINE CLINIC | Facility: CLINIC | Age: 67
End: 2017-06-14

## 2017-06-14 VITALS
OXYGEN SATURATION: 100 % | HEART RATE: 80 BPM | BODY MASS INDEX: 29.23 KG/M2 | DIASTOLIC BLOOD PRESSURE: 70 MMHG | TEMPERATURE: 97.5 F | HEIGHT: 64 IN | WEIGHT: 171.2 LBS | SYSTOLIC BLOOD PRESSURE: 114 MMHG

## 2017-06-14 DIAGNOSIS — N39.0 URINARY TRACT INFECTION, SITE NOT SPECIFIED: Primary | ICD-10-CM

## 2017-06-14 PROCEDURE — 99213 OFFICE O/P EST LOW 20 MIN: CPT | Performed by: NURSE PRACTITIONER

## 2017-06-14 RX ORDER — NITROFURANTOIN 25; 75 MG/1; MG/1
100 CAPSULE ORAL 2 TIMES DAILY
Qty: 14 CAPSULE | Refills: 0 | Status: SHIPPED | OUTPATIENT
Start: 2017-06-14 | End: 2017-08-25

## 2017-06-14 NOTE — PROGRESS NOTES
Subjective   Jennifer Amos is a 66 y.o. female presents with possible UTI. Reports increased urination and achiness. Denies fever or chills. Denies dysuria. Frequent UTI's previously. History of macrobid prophylactic, quit taking two days ago secondary to cost. Now with increased urination. Has a consult placed with Dr. Baires, uro-gyn.     Urinary Tract Infection    This is a new problem. The current episode started in the past 7 days. The problem occurs every urination. The problem has been unchanged. The patient is experiencing no pain. There has been no fever. Associated symptoms include frequency. Pertinent negatives include no chills, discharge or flank pain.        The following portions of the patient's history were reviewed and updated as appropriate: allergies, current medications, past family history, past medical history, past social history, past surgical history and problem list.    Review of Systems   Constitutional: Negative.  Negative for chills.   HENT: Negative.    Eyes: Negative.    Respiratory: Negative.    Cardiovascular: Negative.    Gastrointestinal: Negative.    Endocrine: Negative.    Genitourinary: Positive for frequency. Negative for flank pain.   Musculoskeletal: Negative.    Skin: Negative.    Allergic/Immunologic: Negative.    Neurological: Negative.    Hematological: Negative.    Psychiatric/Behavioral: Negative.        Objective   Physical Exam   Constitutional: She is oriented to person, place, and time. She appears well-developed and well-nourished.   HENT:   Head: Normocephalic and atraumatic.   Cardiovascular: Normal rate, regular rhythm and normal heart sounds.  Exam reveals no gallop and no friction rub.    No murmur heard.  Pulmonary/Chest: Effort normal and breath sounds normal. No respiratory distress. She has no wheezes. She has no rales.   Abdominal: Soft. Bowel sounds are normal. She exhibits no distension. There is no tenderness. There is no CVA tenderness.    Neurological: She is alert and oriented to person, place, and time.   Skin: Skin is warm and dry.   Psychiatric: She has a normal mood and affect.   Vitals reviewed.      Assessment/Plan   Jennifer was seen today for urinary frequency.    Diagnoses and all orders for this visit:    Urinary tract infection, site not specified  -     Urine culture (clean catch)    Other orders  -     nitrofurantoin, macrocrystal-monohydrate, (MACROBID) 100 MG capsule; Take 1 capsule by mouth 2 (Two) Times a Day.

## 2017-06-14 NOTE — PATIENT INSTRUCTIONS
Urinary Tract Infection, Adult  A urinary tract infection (UTI) is an infection of any part of the urinary tract, which includes the kidneys, ureters, bladder, and urethra. These organs make, store, and get rid of urine in the body. UTI can be a bladder infection (cystitis) or kidney infection (pyelonephritis).  CAUSES  This infection may be caused by fungi, viruses, or bacteria. Bacteria are the most common cause of UTIs. This condition can also be caused by repeated incomplete emptying of the bladder during urination.   RISK FACTORS  This condition is more likely to develop if:   · You ignore your need to urinate or hold urine for long periods of time.  · You do not empty your bladder completely during urination.  · You wipe back to front after urinating or having a bowel movement, if you are female.  · You are uncircumcised, if you are male.    · You are constipated.  · You have a urinary catheter that stays in place (indwelling).  · You have a weak defense (immune) system.  · You have a medical condition that affects your bowels, kidneys, or bladder.  · You have diabetes.  · You take antibiotic medicines frequently or for long periods of time, and the antibiotics no longer work well against certain types of infections (antibiotic resistance).  · You take medicines that irritate your urinary tract.  · You are exposed to chemicals that irritate your urinary tract.  · You are female.  SYMPTOMS   Symptoms of this condition include:   · Fever.    · Frequent urination or passing small amounts of urine frequently.    · Needing to urinate urgently.    · Pain or burning with urination.    · Urine that smells bad or unusual.    · Cloudy urine.    · Pain in the lower abdomen or back.    · Trouble urinating.    · Blood in the urine.    · Vomiting or being less hungry than normal.     · Diarrhea or abdominal pain.    · Vaginal discharge, if you are female.  DIAGNOSIS  This condition is diagnosed with a medical history and  physical exam. You will also need to provide a urine sample to test your urine. Other tests may be done, including:    · Blood tests.    · Sexually transmitted disease (STD) testing.     If you have had more than one UTI, a cystoscopy or imaging studies may be done to determine the cause of the infections.   TREATMENT   Treatment for this condition often includes a combination of two or more of the following:   · Antibiotic medicine.    · Other medicines to treat less common causes of UTI.    · Over-the-counter medicines to treat pain.    · Drinking enough water to stay hydrated.  HOME CARE INSTRUCTIONS  · Take over-the-counter and prescription medicines only as told by your health care provider.  · If you were prescribed an antibiotic, take it as told by your health care provider. Do not stop taking the antibiotic even if you start to feel better.  · Avoid alcohol, caffeine, tea, and carbonated beverages. They can irritate your bladder.  · Drink enough fluid to keep your urine clear or pale yellow.  · Keep all follow-up visits as told by your health care provider. This is important.  · Make sure to:    Empty your bladder often and completely. Do not hold urine for long periods of time.    Empty your bladder before and after sex.    Wipe from front to back after a bowel movement if you are female. Use each tissue one time when you wipe.  SEEK MEDICAL CARE IF:   · You have back pain.    · You have a fever.  · You feel nauseous or vomit.    · Your symptoms do not get better after 3 days.     · Your symptoms go away and then return.  SEEK IMMEDIATE MEDICAL CARE IF:   · You have severe back pain or lower abdominal pain.    · You are vomiting and cannot keep down any medicines or water.     This information is not intended to replace advice given to you by your health care provider. Make sure you discuss any questions you have with your health care provider.     Document Released: 09/27/2006 Document Revised: 04/10/2017  Document Reviewed: 11/07/2016  [employer] Interactive Patient Education ©2017 [employer]

## 2017-06-19 ENCOUNTER — TELEPHONE (OUTPATIENT)
Dept: FAMILY MEDICINE CLINIC | Facility: CLINIC | Age: 67
End: 2017-06-19

## 2017-06-19 LAB
BACTERIA UR CULT: ABNORMAL
BACTERIA UR CULT: ABNORMAL
OTHER ANTIBIOTIC SUSC ISLT: ABNORMAL

## 2017-06-19 RX ORDER — SULFAMETHOXAZOLE AND TRIMETHOPRIM 800; 160 MG/1; MG/1
1 TABLET ORAL 2 TIMES DAILY
Qty: 20 TABLET | Refills: 0 | Status: SHIPPED | OUTPATIENT
Start: 2017-06-19 | End: 2017-08-25

## 2017-06-19 NOTE — TELEPHONE ENCOUNTER
Called and informed patient to d/c macrobid and to start taking bactrim per Dr. POSADAS, pt verbalized understanding

## 2017-06-19 NOTE — TELEPHONE ENCOUNTER
----- Message from Pat Manzanares sent at 6/19/2017 10:44 AM EDT -----  Ph [phone]    Urine culture results   Pt taking Macrobid 100mg BID for 7 days

## 2017-06-19 NOTE — TELEPHONE ENCOUNTER
Changed script to bactrim D.S 1  Po bid #20.  Stop nitrofurantoin no sensitive.      Send script to pharm of pts choice  Per DrM

## 2017-06-20 ENCOUNTER — APPOINTMENT (OUTPATIENT)
Dept: MAMMOGRAPHY | Facility: HOSPITAL | Age: 67
End: 2017-06-20
Attending: INTERNAL MEDICINE

## 2017-06-20 ENCOUNTER — TELEPHONE (OUTPATIENT)
Dept: FAMILY MEDICINE CLINIC | Facility: CLINIC | Age: 67
End: 2017-06-20

## 2017-06-20 ENCOUNTER — HOSPITAL ENCOUNTER (OUTPATIENT)
Dept: MAMMOGRAPHY | Facility: HOSPITAL | Age: 67
Discharge: HOME | End: 2017-06-20
Attending: SURGERY | Admitting: SURGERY

## 2017-06-20 DIAGNOSIS — Z12.31 VISIT FOR SCREENING MAMMOGRAM: ICD-10-CM

## 2017-06-20 PROCEDURE — 77063 BREAST TOMOSYNTHESIS BI: CPT

## 2017-06-20 PROCEDURE — G0202 SCR MAMMO BI INCL CAD: HCPCS

## 2017-06-20 RX ORDER — CIPROFLOXACIN 500 MG/1
500 TABLET, FILM COATED ORAL 2 TIMES DAILY
Qty: 20 TABLET | Refills: 0 | Status: SHIPPED | OUTPATIENT
Start: 2017-06-20 | End: 2017-10-02

## 2017-06-20 NOTE — TELEPHONE ENCOUNTER
----- Message from Radha Ayala sent at 6/20/2017  9:05 AM EDT -----  Patient has uti , unable to take bactrium-- would like something else   Prescribed  - urine was cultured - in chart     Please change     [facility] [phone]     Patient phone [phone]  Please put in chart unable to take bactrium  States that it causes her to have really bad body aches  Allergic to penicillin

## 2017-06-22 ENCOUNTER — TELEPHONE (OUTPATIENT)
Dept: ONCOLOGY | Facility: HOSPITAL | Age: 67
End: 2017-06-22

## 2017-06-22 ENCOUNTER — TELEPHONE (OUTPATIENT)
Dept: SURGERY | Facility: CLINIC | Age: 67
End: 2017-06-22

## 2017-06-22 NOTE — TELEPHONE ENCOUNTER
Patient calling for mammogram results.  Message sent to Dr. Combs to make sure he is ok with giving her benign results.

## 2017-06-22 NOTE — TELEPHONE ENCOUNTER
Bilateral screening mammogram with 3-D [facility] June 22, 2017'  Scattered fibroglandular densities.  Benign mammogram showing areas of fat necrosis within the patient's left breast TRAM flap BI-RADS 2.

## 2017-08-16 ENCOUNTER — OFFICE VISIT (OUTPATIENT)
Dept: ORTHOPEDIC SURGERY | Facility: CLINIC | Age: 67
End: 2017-08-16

## 2017-08-16 VITALS — WEIGHT: 179.4 LBS | TEMPERATURE: 97.9 F | HEIGHT: 66 IN | BODY MASS INDEX: 28.83 KG/M2

## 2017-08-16 DIAGNOSIS — S92.515A CLOSED NONDISPLACED FRACTURE OF PROXIMAL PHALANX OF LESSER TOE OF LEFT FOOT, INITIAL ENCOUNTER: ICD-10-CM

## 2017-08-16 DIAGNOSIS — M21.621 BUNIONETTE OF RIGHT FOOT: ICD-10-CM

## 2017-08-16 DIAGNOSIS — S99.922A TOE INJURY, LEFT, INITIAL ENCOUNTER: Primary | ICD-10-CM

## 2017-08-16 DIAGNOSIS — M19.079 ARTHRITIS OF FOOT: ICD-10-CM

## 2017-08-16 PROCEDURE — 99214 OFFICE O/P EST MOD 30 MIN: CPT | Performed by: ORTHOPAEDIC SURGERY

## 2017-08-16 PROCEDURE — 73630 X-RAY EXAM OF FOOT: CPT | Performed by: ORTHOPAEDIC SURGERY

## 2017-08-16 RX ORDER — MELOXICAM 15 MG/1
TABLET ORAL
Qty: 30 TABLET | Refills: 1 | Status: SHIPPED | OUTPATIENT
Start: 2017-08-16 | End: 2017-08-25

## 2017-08-16 NOTE — PROGRESS NOTES
"Foot Follow Up      Patient: Jennifer Amos    YOB: 1950 67 y.o. female    Chief Complaints: My left little toe hurts in the right foot hurts a little    History of Present Illness: Patient has been seen for right midfoot arthritis as well as mucoid cyst of the right fourth toe with bunionette deformity.  She's also seen today for her left fifth toe.  She ran over the shopping cart 3 weeks ago.  She describes mild intermittent aching chronic pain in the dorsum of the right midfoot as well as mild intermittent aching pain and swelling left fifth toe.    She was taking meloxicam last summer which helped her right foot significantly but she has stopped it she was worried about taking this for prolonged period of time.    She had some mild intermittent discomfort over the lateral aspect of the fifth metatarsal right greater than left.  With some associated swelling  HPI    ROS: Foot pain no numbness or tingling  Past Medical History:   Diagnosis Date   • Acid reflux    • Arthritis    • Breast cancer     Left, stage IA ([id number]) ER/IA positive, HER2/negative, invasive mammary carcinoma    • Disease of thyroid gland    • Fat necrosis     Superior to the left breast, benign per biopsy   • H/O Left calf vein thrombosis 10/2011   • History of anemia     Mild   • History of fall     With left soulder and knee injuries   • History of osteopenia    • Hypercholesteremia    • Hypothyroidism    • Incisional hernia     Of the abdomen status post repair by Dr. Zachary Brown Jr.   • Joint pain    • Meniscus tear     LEFT KNEE   • Sarcoidosis     DORMANT     Physical Exam:   Vitals:    08/16/17 1055   Temp: 97.9 °F (36.6 °C)   TempSrc: Temporal Artery    Weight: 179 lb 6.4 oz (81.4 kg)   Height: 66\" (167.6 cm)     Well developed with normal mood.Right foot shows moderate bunion deformity with slight bursal formation but no warmth erythema and minimal tenderness to palpation there is minimal discomfort over the " dorsum of the right midfoot but no warmth erythema or swelling.  Neutral dorsiflexion a heel cord.  Left foot shows mild swelling to the left fifth toe.  There is chronic-appearing cockup and slight crossover deformity of both fifth toes.  There is mild prominence to the lateral aspect of the left fifth metatarsal but no warmth erythema. There is no clinical deformity of the left fifth toe with mild discomfort palpation of the base of the proximal phalanx      Radiology: 3 views of the left foot to evaluate pain and reviewed and compared with x-rays of left foot from October 2014 she has a persistently wide fourth fifth intermetatarsal angle without change.  There appears to be a nondisplaced healing fracture over the base of the left fifth proximal phalanx.      Assessment/Plan:  1.  Left fifth proximal phalanx fracture  2.  Left chronic midfoot arthritis  3.  Bilateral bunionette deformity right or symptomatic than left    Regarding her left fifth toe fracture.  I'll see anything to do from an operative standpoint for this was demonstrated how to kierra tape her fourth and fifth toes and she declined a postoperative shoe and she'll use stiff accommodative shoes.    Regarding her right midfoot arthritis discussed treatment options and she decided to hold off on injection at this time.  HELPED significantly in the past and will restart her on this 15 mg one by mouth daily with GI precautions as she is leaving her trip to [place] in the near future for several weeks. She understands if she needs to be on this for prolonged period of time she'll need a liver and kidney function monitored by her primary care physician for further dosing through him.   Also demonstrated heel cord stretching exercises for her to do at least 4 times per day.  Instruction sheet was provided and demonstrated for her and discussed etiology of heel cord stretching to midfoot overload.    Regarding her bunionette formation crossover deformity of  her fifth toes.  He is not bothering her enough for surgery at this time but she understands if it is bothersome down the road are surgical procedures for this.      I will see her back in 1 month x-rays of her left foot.

## 2017-08-25 ENCOUNTER — OFFICE VISIT (OUTPATIENT)
Dept: FAMILY MEDICINE CLINIC | Facility: CLINIC | Age: 67
End: 2017-08-25

## 2017-08-25 VITALS
HEART RATE: 77 BPM | TEMPERATURE: 98 F | WEIGHT: 184 LBS | HEIGHT: 66 IN | DIASTOLIC BLOOD PRESSURE: 72 MMHG | SYSTOLIC BLOOD PRESSURE: 116 MMHG | OXYGEN SATURATION: 98 % | BODY MASS INDEX: 29.57 KG/M2

## 2017-08-25 DIAGNOSIS — M54.17 LUMBOSACRAL RADICULOPATHY AT S1: ICD-10-CM

## 2017-08-25 DIAGNOSIS — N39.0 URINARY TRACT INFECTION WITHOUT HEMATURIA, SITE UNSPECIFIED: Primary | ICD-10-CM

## 2017-08-25 DIAGNOSIS — J30.1 SEASONAL ALLERGIC RHINITIS DUE TO POLLEN: ICD-10-CM

## 2017-08-25 LAB
BILIRUB BLD-MCNC: NEGATIVE MG/DL
CLARITY, POC: CLEAR
COLOR UR: YELLOW
GLUCOSE UR STRIP-MCNC: NEGATIVE MG/DL
KETONES UR QL: NEGATIVE
LEUKOCYTE EST, POC: NEGATIVE
NITRITE UR-MCNC: NEGATIVE MG/ML
PH UR: 6 [PH] (ref 5–8)
PROT UR STRIP-MCNC: NEGATIVE MG/DL
RBC # UR STRIP: NEGATIVE /UL
SP GR UR: 1.01 (ref 1–1.03)
UROBILINOGEN UR QL: NORMAL

## 2017-08-25 PROCEDURE — 81003 URINALYSIS AUTO W/O SCOPE: CPT | Performed by: FAMILY MEDICINE

## 2017-08-25 PROCEDURE — 99213 OFFICE O/P EST LOW 20 MIN: CPT | Performed by: FAMILY MEDICINE

## 2017-08-25 RX ORDER — METHYLPREDNISOLONE 4 MG/1
TABLET ORAL
Qty: 21 TABLET | Refills: 0 | Status: SHIPPED | OUTPATIENT
Start: 2017-08-25 | End: 2017-10-02

## 2017-08-25 RX ORDER — NITROFURANTOIN 25; 75 MG/1; MG/1
100 CAPSULE ORAL 2 TIMES DAILY
Qty: 20 CAPSULE | Refills: 0 | Status: SHIPPED | OUTPATIENT
Start: 2017-08-25 | End: 2017-10-02

## 2017-08-25 NOTE — PROGRESS NOTES
"  Chief Complaint   Patient presents with   • Leg Pain     2 weeks since pt has pain on left leg and some lower back pain        Subjective.../HPI  Patient present today with hx of seeing dr harley and ran a lot of tests. Was given cipro to prevent uti but pt feels like it is not working.  -pain in left lower back and goes down left leg in left s1 distribution.   - feels like blisters in her throat.     I have reviewed the patient's medical history in detail and updated the computerized patient record.    Family History   Problem Relation Age of Onset   • Breast cancer Paternal Aunt 55   • Deep vein thrombosis Other    • Heart disease Other    • Hypertension Other    • Diabetes Other    • Heart failure Mother    • Breast cancer Maternal Aunt 65   • Breast cancer Cousin        Social History     Social History   • Marital status:      Spouse name: N/A   • Number of children: 3   • Years of education: N/A     Occupational History   • Homemaker      Social History Main Topics   • Smoking status: Never Smoker   • Smokeless tobacco: Not on file   • Alcohol use No   • Drug use: No   • Sexual activity: Defer     Other Topics Concern   • Not on file     Social History Narrative       Review of Systems:   Review of Systems   Constitutional: Negative.    HENT: Positive for sore throat.    Eyes: Negative.    Respiratory: Negative.    Cardiovascular: Negative.    Gastrointestinal: Negative.    Endocrine: Negative.    Genitourinary: Negative.    Musculoskeletal: Positive for arthralgias, back pain, myalgias and neck pain.   Allergic/Immunologic: Positive for environmental allergies.   Neurological: Negative.    Hematological: Negative.    Psychiatric/Behavioral: Negative.        Objective:  Vital Signs     Vitals:    08/25/17 1601   BP: 116/72   BP Location: Left arm   Patient Position: Sitting   Pulse: 77   Temp: 98 °F (36.7 °C)   TempSrc: Oral   SpO2: 98%   Weight: 184 lb (83.5 kg)   Height: 66\" (167.6 cm)     Physical " Exam   Constitutional: She is oriented to person, place, and time. She appears well-developed and well-nourished.   HENT:   Head: Normocephalic.   Eyes: Pupils are equal, round, and reactive to light.   Neck: Normal range of motion.   Cardiovascular: Normal rate, regular rhythm and normal heart sounds.    Pulmonary/Chest: Effort normal.   Abdominal: Soft.   Musculoskeletal: Normal range of motion.   Neurological: She is alert and oriented to person, place, and time.   Skin: Skin is warm and dry.        Results Review:      REVIEWED AND DISCUSSED CLINICAL RESULTS WITH PATIENT and REVIEWED AND DISCCUSSED POCT RESULTS WITH PATIENT                          Current Outpatient Prescriptions:   •  anastrozole (ARIMIDEX) 1 MG tablet, Take 1 tablet by mouth Daily., Disp: 90 tablet, Rfl: 3  •  ASPIRIN PO, Take 81 mg by mouth daily., Disp: , Rfl:   •  Calcium Carb-Cholecalciferol 600-500 MG-UNIT capsule, Take  by mouth. HOLD PRIOR TO SURGERY, Disp: , Rfl:   •  Cholecalciferol (VITAMIN D3) 1000 UNITS capsule, Take  by mouth daily., Disp: , Rfl:   •  ciprofloxacin (CIPRO) 500 MG tablet, Take 1 tablet by mouth 2 (Two) Times a Day., Disp: 20 tablet, Rfl: 0  •  Cranberry-Vitamin C-Probiotic (AZO CRANBERRY PO), Take  by mouth., Disp: , Rfl:   •  DULoxetine (CYMBALTA) 60 MG capsule, TAKE 1 CAPSULE BY MOUTH DAILY, Disp: 90 capsule, Rfl: 1  •  EPIPEN 2-DON 0.3 MG/0.3ML solution auto-injector injection, TAKE AS NEEDED FOR SYNCOPE OR SHORTNESS OF AIR, Disp: 2 each, Rfl: 0  •  esomeprazole (nexIUM) 40 MG capsule, TAKE 1 CAPSULE EVERY MORNING BEFORE BREAKFAST, Disp: 90 capsule, Rfl: 0  •  levothyroxine (SYNTHROID, LEVOTHROID) 88 MCG tablet, Take 1 tablet by mouth Daily., Disp: 90 tablet, Rfl: 0  •  lovastatin (MEVACOR) 40 MG tablet, Take 1 tablet by mouth Every Night., Disp: 90 tablet, Rfl: 0  •  Multiple Vitamins-Minerals (CENTRUM SILVER PO), Take  by mouth., Disp: , Rfl:   •  MethylPREDNISolone (MEDROL, DON,) 4 MG tablet, Take as  directed on package instructions., Disp: 21 tablet, Rfl: 0  •  nitrofurantoin, macrocrystal-monohydrate, (MACROBID) 100 MG capsule, Take 1 capsule by mouth 2 (Two) Times a Day., Disp: 20 capsule, Rfl: 0    Procedures    Assessment/Plan     Diagnoses and all orders for this visit:    Urinary tract infection without hematuria, site unspecified  -     Urine Culture    Lumbosacral radiculopathy at S1  -     MRI Lumbar Spine Without Contrast    Seasonal allergic rhinitis due to pollen    Other orders  -     nitrofurantoin, macrocrystal-monohydrate, (MACROBID) 100 MG capsule; Take 1 capsule by mouth 2 (Two) Times a Day.  -     MethylPREDNISolone (MEDROL, DON,) 4 MG tablet; Take as directed on package instructions.       partially treated uti    Dominick Arriaza Jr., MD  08/25/17  4:34 PM

## 2017-08-27 LAB
BACTERIA UR CULT: NO GROWTH
BACTERIA UR CULT: NORMAL

## 2017-08-28 RX ORDER — LOVASTATIN 40 MG/1
TABLET ORAL
Qty: 90 TABLET | Refills: 0 | Status: SHIPPED | OUTPATIENT
Start: 2017-08-28 | End: 2017-10-25 | Stop reason: SDUPTHER

## 2017-08-28 RX ORDER — LEVOTHYROXINE SODIUM 88 UG/1
TABLET ORAL
Qty: 90 TABLET | Refills: 0 | Status: SHIPPED | OUTPATIENT
Start: 2017-08-28 | End: 2017-10-25 | Stop reason: SDUPTHER

## 2017-08-31 ENCOUNTER — TELEPHONE (OUTPATIENT)
Dept: FAMILY MEDICINE CLINIC | Facility: CLINIC | Age: 67
End: 2017-08-31

## 2017-08-31 ENCOUNTER — HOSPITAL ENCOUNTER (OUTPATIENT)
Dept: MRI IMAGING | Facility: HOSPITAL | Age: 67
Discharge: HOME | End: 2017-08-31
Attending: FAMILY MEDICINE | Admitting: FAMILY MEDICINE

## 2017-08-31 ENCOUNTER — APPOINTMENT (OUTPATIENT)
Dept: MRI IMAGING | Facility: HOSPITAL | Age: 67
End: 2017-08-31
Attending: FAMILY MEDICINE

## 2017-08-31 PROCEDURE — 72148 MRI LUMBAR SPINE W/O DYE: CPT

## 2017-09-05 ENCOUNTER — TELEPHONE (OUTPATIENT)
Dept: FAMILY MEDICINE CLINIC | Facility: CLINIC | Age: 67
End: 2017-09-05

## 2017-09-05 DIAGNOSIS — M54.16 LUMBAR RADICULOPATHY: Primary | ICD-10-CM

## 2017-09-05 NOTE — TELEPHONE ENCOUNTER
----- Message from Nicole Morataya sent at 9/5/2017 11:13 AM EDT -----  [phone]    PT WOULD LIKE HER MRI RESULTS.     LSD: 8.25.17    PT IS AWARE TO ALLOW 24 TO 48 HOURS FOR A CALL BACK AFTE R7PM.   THANK YOU

## 2017-09-06 NOTE — PROGRESS NOTES
Told patient results of her MRI of the lumbar spine.  She states that she also is having chronic cervical pain.  Her lumbar pain has improved.  Will refer to Dr. Mckeon.

## 2017-09-30 RX ORDER — DOXYCYCLINE 100 MG/1
100 CAPSULE ORAL 2 TIMES DAILY
Qty: 20 CAPSULE | Refills: 0 | Status: SHIPPED | OUTPATIENT
Start: 2017-09-30 | End: 2017-10-02

## 2017-10-02 ENCOUNTER — TELEPHONE (OUTPATIENT)
Dept: ONCOLOGY | Facility: HOSPITAL | Age: 67
End: 2017-10-02

## 2017-10-02 ENCOUNTER — LAB (OUTPATIENT)
Dept: LAB | Facility: HOSPITAL | Age: 67
End: 2017-10-02

## 2017-10-02 ENCOUNTER — APPOINTMENT (OUTPATIENT)
Dept: ONCOLOGY | Facility: CLINIC | Age: 67
End: 2017-10-02

## 2017-10-02 ENCOUNTER — APPOINTMENT (OUTPATIENT)
Dept: LAB | Facility: HOSPITAL | Age: 67
End: 2017-10-02

## 2017-10-02 ENCOUNTER — OFFICE VISIT (OUTPATIENT)
Dept: ONCOLOGY | Facility: CLINIC | Age: 67
End: 2017-10-02

## 2017-10-02 VITALS
BODY MASS INDEX: 31.12 KG/M2 | HEART RATE: 85 BPM | SYSTOLIC BLOOD PRESSURE: 126 MMHG | TEMPERATURE: 97.9 F | DIASTOLIC BLOOD PRESSURE: 80 MMHG | OXYGEN SATURATION: 96 % | HEIGHT: 65 IN | WEIGHT: 186.8 LBS | RESPIRATION RATE: 14 BRPM

## 2017-10-02 DIAGNOSIS — Z85.3 HISTORY OF BREAST CANCER: ICD-10-CM

## 2017-10-02 DIAGNOSIS — Z85.3 HISTORY OF BREAST CANCER: Primary | ICD-10-CM

## 2017-10-02 DIAGNOSIS — R53.83 OTHER FATIGUE: ICD-10-CM

## 2017-10-02 LAB
ALBUMIN SERPL-MCNC: 4.5 G/DL (ref 3.5–5.2)
ALBUMIN/GLOB SERPL: 1.5 G/DL (ref 1.1–2.4)
ALP SERPL-CCNC: 77 U/L (ref 38–116)
ALT SERPL W P-5'-P-CCNC: 18 U/L (ref 0–33)
ANION GAP SERPL CALCULATED.3IONS-SCNC: 13 MMOL/L
AST SERPL-CCNC: 24 U/L (ref 0–32)
BASOPHILS # BLD AUTO: 0.05 10*3/MM3 (ref 0–0.1)
BASOPHILS NFR BLD AUTO: 0.5 % (ref 0–1.1)
BILIRUB SERPL-MCNC: 0.6 MG/DL (ref 0.1–1.2)
BUN BLD-MCNC: 24 MG/DL (ref 6–20)
BUN/CREAT SERPL: 28.2 (ref 7.3–30)
CALCIUM SPEC-SCNC: 9.7 MG/DL (ref 8.5–10.2)
CHLORIDE SERPL-SCNC: 100 MMOL/L (ref 98–107)
CO2 SERPL-SCNC: 27 MMOL/L (ref 22–29)
CREAT BLD-MCNC: 0.85 MG/DL (ref 0.6–1.1)
DEPRECATED RDW RBC AUTO: 48.8 FL (ref 37–49)
EOSINOPHIL # BLD AUTO: 0.16 10*3/MM3 (ref 0–0.36)
EOSINOPHIL NFR BLD AUTO: 1.7 % (ref 1–5)
ERYTHROCYTE [DISTWIDTH] IN BLOOD BY AUTOMATED COUNT: 16.2 % (ref 11.7–14.5)
GFR SERPL CREATININE-BSD FRML MDRD: 67 ML/MIN/1.73
GLOBULIN UR ELPH-MCNC: 3.1 GM/DL (ref 1.8–3.5)
GLUCOSE BLD-MCNC: 97 MG/DL (ref 74–124)
HCT VFR BLD AUTO: 36.6 % (ref 34–45)
HGB BLD-MCNC: 12 G/DL (ref 11.5–14.9)
IMM GRANULOCYTES # BLD: 0.04 10*3/MM3 (ref 0–0.03)
IMM GRANULOCYTES NFR BLD: 0.4 % (ref 0–0.5)
LYMPHOCYTES # BLD AUTO: 2.67 10*3/MM3 (ref 1–3.5)
LYMPHOCYTES NFR BLD AUTO: 28.7 % (ref 20–49)
MCH RBC QN AUTO: 27.2 PG (ref 27–33)
MCHC RBC AUTO-ENTMCNC: 32.8 G/DL (ref 32–35)
MCV RBC AUTO: 83 FL (ref 83–97)
MONOCYTES # BLD AUTO: 0.92 10*3/MM3 (ref 0.25–0.8)
MONOCYTES NFR BLD AUTO: 9.9 % (ref 4–12)
NEUTROPHILS # BLD AUTO: 5.47 10*3/MM3 (ref 1.5–7)
NEUTROPHILS NFR BLD AUTO: 58.8 % (ref 39–75)
NRBC BLD MANUAL-RTO: 0 /100 WBC (ref 0–0)
PLATELET # BLD AUTO: 227 10*3/MM3 (ref 150–375)
PMV BLD AUTO: 11.4 FL (ref 8.9–12.1)
POTASSIUM BLD-SCNC: 4.7 MMOL/L (ref 3.5–4.7)
PROT SERPL-MCNC: 7.6 G/DL (ref 6.3–8)
RBC # BLD AUTO: 4.41 10*6/MM3 (ref 3.9–5)
SODIUM BLD-SCNC: 140 MMOL/L (ref 134–145)
TSH SERPL DL<=0.05 MIU/L-ACNC: 3.9 MIU/ML (ref 0.27–4.2)
WBC NRBC COR # BLD: 9.31 10*3/MM3 (ref 4–10)

## 2017-10-02 PROCEDURE — 36415 COLL VENOUS BLD VENIPUNCTURE: CPT | Performed by: INTERNAL MEDICINE

## 2017-10-02 PROCEDURE — 80053 COMPREHEN METABOLIC PANEL: CPT | Performed by: INTERNAL MEDICINE

## 2017-10-02 PROCEDURE — 85025 COMPLETE CBC W/AUTO DIFF WBC: CPT | Performed by: INTERNAL MEDICINE

## 2017-10-02 PROCEDURE — 99214 OFFICE O/P EST MOD 30 MIN: CPT | Performed by: INTERNAL MEDICINE

## 2017-10-02 PROCEDURE — 84443 ASSAY THYROID STIM HORMONE: CPT | Performed by: INTERNAL MEDICINE

## 2017-10-02 PROCEDURE — 36416 COLLJ CAPILLARY BLOOD SPEC: CPT | Performed by: INTERNAL MEDICINE

## 2017-10-02 NOTE — TELEPHONE ENCOUNTER
PT CALLING TO LET DR GARDUNO KNOW OF MEDICATIONS THAT WERE PRESCRIBED TO HER FROM DR JAMES  CLOBETASOL/WHITE/PET (NO LONGER TAKING, CONTAINS NO ESTROGEN)  METHENAMINE HIPPURATE 1 GM DAILY ( NO ESTROGEN)

## 2017-10-02 NOTE — PROGRESS NOTES
[facility] GROUP OUTPATIENT CLINIC FOLLOW UP VISIT    REASON FOR FOLLOW-UP:    1.  History of stage I a hormone receptor positive HER-2 negative invasive mammary carcinoma of the lower inner quadrant of the left breast.  She had a left mastectomy with TRAM flap reconstruction.  2.  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  3.  Anastrozole 1 mg daily was initiated on 10/16/2013.  4.  History of fat necrosis above the left breast in November 2013.    HISTORY OF PRESENT ILLNESS:  Jennifer Amos is a 67 y.o. female who returns today for follow up of the above issue.      She saw Dr. Melany Baires with urogynecology after complaining of frequent urinary tract infections.  She was treated with a topical therapy, possibly steroid but her note also indicated she might of planned for a topical estrogen.  The patient is not sure today.    She recently returned from a three-week trip to [place].  She is exhausted.  She has some postnasal drip and a mild sore throat and believes she may have picked up a mild upper respiratory infection.  No fevers or chills.    PAST MEDICAL, SURGICAL, FAMILY, AND SOCIAL HISTORIES WERE REVIEWED WITH THE PATIENT AND IN THE ELECTRONIC MEDICAL RECORD, AND WERE UPDATED IF INDICATED.    ALLERGIES:  Allergies   Allergen Reactions   • Hydrocodone Shortness Of Breath   • Percocet [Oxycodone-Acetaminophen] Shortness Of Breath   • Sulfa Antibiotics      Stomach pain   • Penicillins Other (See Comments)     CHILDHOOD REACTION       MEDICATIONS:  The medication list has been reviewed with the patient by the medical assistant, and the list has been updated in the electronic medical record, which I reviewed.  Medication dosages and frequencies were confirmed to be accurate.    REVIEW OF SYSTEMS:  PAIN:  See Vital Signs below.  GENERAL:  No fevers, chills, night sweats, or unintended weight loss.  See history of present illness  SKIN:  Skin tags scattered on her back and beneath  "both breasts  HEME/LYMPH:  No abnormal bleeding.  No palpable lymphadenopathy.  EYES:  No vision changes or diplopia.  ENT:  See history of present illness  RESPIRATORY:  No cough, shortness of breath, hemoptysis, or wheezing.  CARDIOVASCULAR:  No chest pain, palpitations, orthopnea, or dyspnea on exertion.  GASTROINTESTINAL:  No abdominal pain, nausea, vomiting, constipation, diarrhea, melena, or hematochezia.  GENITOURINARY:  See history of present illness  MUSCULOSKELETAL:  No joint pain, swelling, or erythema.  NEUROLOGIC:  No dizziness, loss of consciousness, or seizures.  PSYCHIATRIC:  No depression, anxiety, or mood changes.    Vitals:    10/02/17 1516   BP: 126/80   Pulse: 85   Resp: 14   Temp: 97.9 °F (36.6 °C)   TempSrc: Oral   SpO2: 96%   Weight: 186 lb 12.8 oz (84.7 kg)   Height: 65\" (165.1 cm)   PainSc: 2  Comment: headache and neck pain       PHYSICAL EXAMINATION:  GENERAL:  Well-developed well-nourished female; awake, alert and oriented, in no acute distress.  SKIN:  Warm and dry, without rashes, purpura, or petechiae.  Several small skin tags measuring no more than a couple of millimeters are present on her back and both breasts.   HEAD:  Normocephalic, atraumatic.  EYES:  Pupils equal, round and reactive to light.  Extraocular movements intact.  Conjunctivae normal.  EARS:  Hearing intact.  NOSE:  Septum midline.  No excoriations or nasal discharge.  MOUTH:  No stomatitis or ulcers.  Lips are normal.  THROAT:  Oropharynx without lesions or exudates.  NECK:  Supple with good range of motion; no thyromegaly or masses; no JVD or bruits.  LYMPHATICS:  No cervical, supraclavicular, axillary, or inguinal lymphadenopathy.  CHEST:  Lungs are clear to auscultation bilaterally.  No wheezes, rales, or rhonchi.  BREASTS:  Both breasts were examined today.  Left TRAM flap.  Stable post-surgical changes.  Incisions are well-healed.  No axillary lymphadenopathy.  The right breast is normal without nodules or " nipple discharge.  HEART:  Regular rate; normal rhythm.  No murmurs, gallops or rubs.  ABDOMEN:  Soft, non-tender, non-distended.  Normal active bowel sounds.  No organomegaly.  EXTREMITIES:  No clubbing, cyanosis, or edema.  NEUROLOGICAL:  No focal neurologic deficits.    DIAGNOSTIC DATA:  Lab Results   Component Value Date    WBC 9.31 10/02/2017    HGB 12.0 10/02/2017    HCT 36.6 10/02/2017    MCV 83.0 10/02/2017     10/02/2017     IMAGING:     CT imaging of the abdomen and pelvis from 4/5/2017 done for hematuria shows no evidence for kidney stone or renal mass.  No findings to explain hematuria.  Otherwise no acute process.    Mammogram on 6/20/2017:    IMPRESSIONS: Benign mammogram showing areas of fat necrosis within the  patient's left breast TRAM flap reconstruction.      BI-RADS category 2-benign findings.    ASSESSMENT:  This is a 67 y.o. female with:  1.  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  2.  History of stage I hormone receptor positive HER-2 negative invasive mammary carcinoma of the lower inner quadrant of the left breast.  She had a left mastectomy with TRAM flap reconstruction.  Anastrozole 1 mg daily was initiated October 2013.  She had a very low Oncotype DX recurrence score.  3.  History of fat necrosis of the left breast  4.  Osteopenia:  She gets annual Reclast which is prescribed by Dr. Arriaza.  This was last administered in May 2016.  5.  Fatigue: Certainly anastrozole can be contributing.  We will check a CMP and TSH today as her fatigue is much worse.  She recently returned from [place] and this could be contributing as well.  New issue addressed today.  6.  Frequent urinary tract infections: She saw Dr. Baires with urogynecology.  I reminded her today that we discourage the use of any estrogen therapy, even topical estrogen.    PLAN:   1.  Continue anastrozole 1mg daily with plans to continue this through October 2018.  2.  I will see her back in 6 months for  follow-up with a clinical breast exam and a CBC that day.  3.  Mammogram in June 2018.  This was ordered today.  4.  CMP and TSH today for fatigue.

## 2017-10-03 ENCOUNTER — TELEPHONE (OUTPATIENT)
Dept: ONCOLOGY | Facility: CLINIC | Age: 67
End: 2017-10-03

## 2017-10-03 ENCOUNTER — OFFICE VISIT (OUTPATIENT)
Dept: ORTHOPEDIC SURGERY | Facility: CLINIC | Age: 67
End: 2017-10-03

## 2017-10-03 VITALS — BODY MASS INDEX: 29.99 KG/M2 | WEIGHT: 186.6 LBS | TEMPERATURE: 98.4 F | HEIGHT: 66 IN

## 2017-10-03 DIAGNOSIS — M47.22 CERVICAL SPONDYLOSIS WITH RADICULOPATHY: ICD-10-CM

## 2017-10-03 DIAGNOSIS — M54.2 CERVICAL SPINE PAIN: Primary | ICD-10-CM

## 2017-10-03 DIAGNOSIS — M43.16 SPONDYLOLISTHESIS OF LUMBAR REGION: ICD-10-CM

## 2017-10-03 DIAGNOSIS — M48.061 SPINAL STENOSIS OF LUMBAR REGION WITHOUT NEUROGENIC CLAUDICATION: ICD-10-CM

## 2017-10-03 PROCEDURE — 72040 X-RAY EXAM NECK SPINE 2-3 VW: CPT | Performed by: ORTHOPAEDIC SURGERY

## 2017-10-03 PROCEDURE — 99214 OFFICE O/P EST MOD 30 MIN: CPT | Performed by: ORTHOPAEDIC SURGERY

## 2017-10-03 RX ORDER — PSEUDOEPHEDRINE HCL 30 MG
30 TABLET ORAL EVERY 4 HOURS PRN
COMMUNITY
End: 2018-03-27

## 2017-10-03 RX ORDER — LORATADINE 10 MG/1
10 TABLET ORAL DAILY
COMMUNITY
End: 2018-03-27

## 2017-10-03 NOTE — TELEPHONE ENCOUNTER
----- Message from Jeremi Combs MD sent at 10/3/2017  9:42 AM EDT -----  Please call the patient regarding her results.  Please let her know that her blood work including thyroid is normal.  Thanks!  HECTOR    Called pt and notified her of this. No questions or concerns.

## 2017-10-03 NOTE — PROGRESS NOTES
Cristina for HPI/ROS submitted by the patient on 10/1/2017   Back pain  Chronicity: recurrent  Onset: more than 1 month ago  Frequency: intermittently  Progression since onset: resolved  Pain location: lumbar spine, thoracic spine  Pain quality: aching  Radiates to: left thigh  Pain - numeric: 3/10  Pain is: worse during the night  Aggravated by: lying down  Stiffness is present: at night  abdominal pain: No  bladder incontinence: No  bowel incontinence: No  chest pain: No  dysuria: No  fever: No  headaches: No  leg pain: Yes  numbness: No  paresis: No  paresthesias: No  pelvic pain: No  perianal numbness: No  tingling: No  weakness: No  weight loss: No  Risk factors: history of cancer, lack of exercise    New patient or new problem visit    Chief Complaint   Patient presents with   • Cervical Spine - Establish Care, Pain   • Lumbar Spine - Establish Care, Pain       HPI: She complains of improving lumbar pain for which she originally scheduled but is now wanting to be seen for neck pain which radiates to the occiput more in the left than the right no numbness tingling weakness she has some low back pain which previously radiated to the left lower extremity.  The neck pain is moderate intermittent and aching.  No treatment for it.    PFSH: See chart- reviewed    Review of Systems   Constitutional: Positive for unexpected weight change. Negative for chills and fever.   HENT: Positive for hearing loss and postnasal drip. Negative for trouble swallowing and voice change.    Eyes: Negative for visual disturbance.   Respiratory: Positive for cough. Negative for shortness of breath.    Cardiovascular: Negative for chest pain and leg swelling.   Gastrointestinal: Negative for abdominal pain, nausea and vomiting.   Endocrine: Negative for cold intolerance and heat intolerance.   Genitourinary: Negative for difficulty urinating, dysuria, frequency, pelvic pain and urgency.   Musculoskeletal: Positive for back pain.   Skin:  Negative for rash and wound.   Allergic/Immunologic: Negative for immunocompromised state.   Neurological: Negative for weakness, numbness and headaches.   Hematological: Bruises/bleeds easily.   Psychiatric/Behavioral: Negative for dysphoric mood. The patient is not nervous/anxious.        PE: Constitutional: Vital signs above-noted.  Awake, alert and oriented    Psychiatric: Affect and insight do not appear grossly disturbed.    Pulmonary: Breathing is unlabored, color is good.    Skin: Warm, dry and normal turgor    Cardiac:  radial pulses intact.  No arm edema.    Eyesight and hearing appear adequate for examination purposes    Musculoskeletal: Neck Posture is unremarkable to coronal and sagittal inspection.  Motion appears undisturbed.  The skin about the area is intact.  There is no palpable or visible deformity.  There is no local spasm. There is mild tenderness to percussion and palpation of the upper cervical spine.     Neurologic:  In the bilateral upper extremities there is no evidence of atrophy.  Motor function is undisturbed in shoulder abduction, elbow flexion, wrist extension, finger extension, triceps extension, or finger abduction   .  Sensation appears symmetrically intact to light touch   .  Reflexes are 2+ and symmetrical in the biceps, brachioradialis, and triceps. Marti test is negative.  Gait appears undisturbed.  Spurling test is negative.    Neurologic:  She is tender to lumbosacral junction, minimally.  In the bilateral lower extremities there is no evidence of atrophy.  Motor function is undisturbed in quadriceps, EHL, and gastrocnemius      Sensation appears symmetrically intact to light touch   .  Reflexes are 2+ and symmetrical in the patellae and achilles.  Clonus is absent..  Gait appears undisturbed.  SLR test negative      MEDICAL DECISION MAKING    XRAY: Plain film x-rays of cervical spine 2 views show good images of the nice lordotic curve disc degeneration at C6 7 with  osteophytes, and some sclerosis between the atlas and ring at 1 to.  No Comparison views are available.  Lumbar MRI showed L4 5 spondylolisthesis with mild spinal stenosis.  Cervical MRI from over a year ago demonstrated C5 6 and C6 7 spondylosis.  I reviewed the radiologist reports with which I agree.    Other: n/a    Impression: Cervical spondylosis with asymptomatic canal stenosis and L4 5 degenerative spondylolisthesis with spinal stenosis.    Plan: She is improving so for now I'm recommending traction, and physical therapy for the neck and back.  I will see her back as needed

## 2017-10-25 RX ORDER — LOVASTATIN 40 MG/1
TABLET ORAL
Qty: 90 TABLET | Refills: 0 | Status: SHIPPED | OUTPATIENT
Start: 2017-10-25

## 2017-10-25 RX ORDER — DULOXETIN HYDROCHLORIDE 60 MG/1
CAPSULE, DELAYED RELEASE ORAL
Qty: 30 CAPSULE | Refills: 0 | Status: SHIPPED | OUTPATIENT
Start: 2017-10-25 | End: 2018-03-27 | Stop reason: SDUPTHER

## 2017-10-25 RX ORDER — LEVOTHYROXINE SODIUM 88 UG/1
TABLET ORAL
Qty: 90 TABLET | Refills: 0 | Status: SHIPPED | OUTPATIENT
Start: 2017-10-25 | End: 2018-09-12 | Stop reason: DRUGHIGH

## 2018-02-20 ENCOUNTER — OFFICE VISIT (OUTPATIENT)
Dept: ORTHOPEDIC SURGERY | Facility: CLINIC | Age: 68
End: 2018-02-20

## 2018-02-20 VITALS — TEMPERATURE: 98.4 F | HEIGHT: 66 IN | BODY MASS INDEX: 30.73 KG/M2 | WEIGHT: 191.2 LBS

## 2018-02-20 DIAGNOSIS — M25.562 CHRONIC PAIN OF BOTH KNEES: Primary | ICD-10-CM

## 2018-02-20 DIAGNOSIS — G89.29 CHRONIC PAIN OF BOTH KNEES: Primary | ICD-10-CM

## 2018-02-20 DIAGNOSIS — M25.561 CHRONIC PAIN OF BOTH KNEES: Primary | ICD-10-CM

## 2018-02-20 PROCEDURE — 73562 X-RAY EXAM OF KNEE 3: CPT | Performed by: NURSE PRACTITIONER

## 2018-02-20 PROCEDURE — 99212 OFFICE O/P EST SF 10 MIN: CPT | Performed by: NURSE PRACTITIONER

## 2018-02-20 NOTE — PROGRESS NOTES
Patient: Jennifer Amos  YOB: 1950 67 y.o. female  Medical Record Number: [id number]    Chief Complaints:   Chief Complaint   Patient presents with   • Left Knee - Follow-up, Pain   • Right Knee - Follow-up, Pain       History of Present Illness:Jennifer Amos is a 67 y.o. female who presents for follow-up of  Bilateral knee replacement surgery.  Last surgery was actually left knee unicompartmental replacement almost 2 years ago.  Overall she is doing okay, she has some occasional achiness in both knees but thinks it's related to recent waking.  She denies any injury.    Allergies:   Allergies   Allergen Reactions   • Hydrocodone Shortness Of Breath   • Oxycodone-Acetaminophen Shortness Of Breath   • Percocet [Oxycodone-Acetaminophen] Shortness Of Breath   • Sulfa Antibiotics      Stomach pain   • Penicillins Other (See Comments)     CHILDHOOD REACTION       Medications:   Current Outpatient Prescriptions   Medication Sig Dispense Refill   • anastrozole (ARIMIDEX) 1 MG tablet Take 1 tablet by mouth Daily. 90 tablet 3   • ASPIRIN PO Take 81 mg by mouth daily.     • Calcium Carb-Cholecalciferol 600-500 MG-UNIT capsule Take  by mouth. HOLD PRIOR TO SURGERY     • Cholecalciferol (VITAMIN D3) 1000 UNITS capsule Take  by mouth daily.     • DULoxetine (CYMBALTA) 60 MG capsule TAKE 1 CAPSULE BY MOUTH DAILY 90 capsule 1   • DULoxetine (CYMBALTA) 60 MG capsule TAKE 1 CAPSULE BY MOUTH DAILY 30 capsule 0   • EPIPEN 2-DON 0.3 MG/0.3ML solution auto-injector injection TAKE AS NEEDED FOR SYNCOPE OR SHORTNESS OF AIR 2 each 0   • esomeprazole (nexIUM) 40 MG capsule TAKE 1 CAPSULE EVERY MORNING BEFORE BREAKFAST 90 capsule 0   • levothyroxine (SYNTHROID, LEVOTHROID) 88 MCG tablet TAKE 1 TABLET BY MOUTH DAILY 90 tablet 0   • loratadine (WAL-ITIN) 10 MG tablet Take 10 mg by mouth Daily.     • lovastatin (MEVACOR) 40 MG tablet TAKE 1 TABLET BY MOUTH EVERY NIGHT 90 tablet 0   • Multiple Vitamins-Minerals (CENTRUM SILVER  "PO) Take  by mouth.     • pseudoephedrine (SUDAFED) 30 MG tablet Take 30 mg by mouth Every 4 (Four) Hours As Needed for Congestion.       No current facility-administered medications for this visit.          The following portions of the patient's history were reviewed and updated as appropriate: allergies, current medications, past family history, past medical history, past social history, past surgical history and problem list.    Review of Systems:   A 14 point review of systems was performed. All systems negative except pertinent positives/negative listed in HPI above    Physical Exam:   Vitals:    02/20/18 1340   Temp: 98.4 °F (36.9 °C)   TempSrc: Temporal Artery    Weight: 86.7 kg (191 lb 3.2 oz)   Height: 167.6 cm (66\")       General: A and O x 3, ASA, NAD    SCLERA:    Normal    DENTITION:   Normal  Skin clear no unusual lesions noted  Bilateral knees patient has well-healed surgical incisions noted excellent range of motion with no instability calf soft and nontender    Radiology:  Xrays 3views (ap,lateral, sunrise) bilateral knees were ordered and reviewed today secondary to previous surgery show well-placed well-positioned right total knee replacement.  Comparative views are unchanged.  In addition 3 views left knee were ordered and reviewed today show well-placed well positioned left knee unicompartmental replacement.  Comparative views are unchanged     Assessment/Plan:  Status post bilateral knee replacement surgery    Patient was encouraged to lose weight, she will take meloxicam as needed, and let us know if her symptoms do not improve or if they worsen.  "

## 2018-03-19 ENCOUNTER — APPOINTMENT (OUTPATIENT)
Dept: LAB | Facility: HOSPITAL | Age: 68
End: 2018-03-19

## 2018-03-19 ENCOUNTER — APPOINTMENT (OUTPATIENT)
Dept: ONCOLOGY | Facility: CLINIC | Age: 68
End: 2018-03-19

## 2018-03-27 ENCOUNTER — OFFICE VISIT (OUTPATIENT)
Dept: ONCOLOGY | Facility: CLINIC | Age: 68
End: 2018-03-27

## 2018-03-27 ENCOUNTER — LAB (OUTPATIENT)
Dept: LAB | Facility: HOSPITAL | Age: 68
End: 2018-03-27

## 2018-03-27 VITALS
WEIGHT: 191.2 LBS | HEIGHT: 66 IN | OXYGEN SATURATION: 97 % | BODY MASS INDEX: 30.73 KG/M2 | RESPIRATION RATE: 14 BRPM | TEMPERATURE: 98.8 F | HEART RATE: 85 BPM | DIASTOLIC BLOOD PRESSURE: 90 MMHG | SYSTOLIC BLOOD PRESSURE: 150 MMHG

## 2018-03-27 DIAGNOSIS — M85.80 OSTEOPENIA, UNSPECIFIED LOCATION: ICD-10-CM

## 2018-03-27 DIAGNOSIS — M81.0 SENILE OSTEOPOROSIS: ICD-10-CM

## 2018-03-27 DIAGNOSIS — Z12.31 ENCOUNTER FOR SCREENING MAMMOGRAM FOR MALIGNANT NEOPLASM OF BREAST: ICD-10-CM

## 2018-03-27 DIAGNOSIS — Z85.3 HISTORY OF BREAST CANCER: Primary | ICD-10-CM

## 2018-03-27 DIAGNOSIS — N30.00 ACUTE CYSTITIS WITHOUT HEMATURIA: ICD-10-CM

## 2018-03-27 DIAGNOSIS — R53.83 OTHER FATIGUE: Primary | ICD-10-CM

## 2018-03-27 LAB
BASOPHILS # BLD AUTO: 0.04 10*3/MM3 (ref 0–0.1)
BASOPHILS NFR BLD AUTO: 0.5 % (ref 0–1.1)
DEPRECATED RDW RBC AUTO: 48.3 FL (ref 37–49)
EOSINOPHIL # BLD AUTO: 0.16 10*3/MM3 (ref 0–0.36)
EOSINOPHIL NFR BLD AUTO: 2 % (ref 1–5)
ERYTHROCYTE [DISTWIDTH] IN BLOOD BY AUTOMATED COUNT: 16.2 % (ref 11.7–14.5)
HCT VFR BLD AUTO: 39.3 % (ref 34–45)
HGB BLD-MCNC: 12.7 G/DL (ref 11.5–14.9)
IMM GRANULOCYTES # BLD: 0.03 10*3/MM3 (ref 0–0.03)
IMM GRANULOCYTES NFR BLD: 0.4 % (ref 0–0.5)
LYMPHOCYTES # BLD AUTO: 2.78 10*3/MM3 (ref 1–3.5)
LYMPHOCYTES NFR BLD AUTO: 34.9 % (ref 20–49)
MCH RBC QN AUTO: 26.7 PG (ref 27–33)
MCHC RBC AUTO-ENTMCNC: 32.3 G/DL (ref 32–35)
MCV RBC AUTO: 82.6 FL (ref 83–97)
MONOCYTES # BLD AUTO: 0.38 10*3/MM3 (ref 0.25–0.8)
MONOCYTES NFR BLD AUTO: 4.8 % (ref 4–12)
NEUTROPHILS # BLD AUTO: 4.58 10*3/MM3 (ref 1.5–7)
NEUTROPHILS NFR BLD AUTO: 57.4 % (ref 39–75)
NRBC BLD MANUAL-RTO: 0 /100 WBC (ref 0–0)
PLATELET # BLD AUTO: 219 10*3/MM3 (ref 150–375)
PMV BLD AUTO: 11.7 FL (ref 8.9–12.1)
RBC # BLD AUTO: 4.76 10*6/MM3 (ref 3.9–5)
WBC NRBC COR # BLD: 7.97 10*3/MM3 (ref 4–10)

## 2018-03-27 PROCEDURE — 36415 COLL VENOUS BLD VENIPUNCTURE: CPT | Performed by: INTERNAL MEDICINE

## 2018-03-27 PROCEDURE — 85025 COMPLETE CBC W/AUTO DIFF WBC: CPT | Performed by: INTERNAL MEDICINE

## 2018-03-27 PROCEDURE — 99213 OFFICE O/P EST LOW 20 MIN: CPT | Performed by: INTERNAL MEDICINE

## 2018-03-27 NOTE — PROGRESS NOTES
[facility] OUTPATIENT CLINIC FOLLOW UP VISIT    REASON FOR FOLLOW-UP:    1.  History of stage I a (pT1b, N0) hormone receptor positive HER-2 negative invasive mammary carcinoma of the lower inner quadrant of the left breast.  11 mm abnormality by imaging.  7 mm invasive carcinoma at biopsy.  She had a left mastectomy with TRAM flap reconstruction.  5 mm grade 2 tumor at resection.  DCIS measured 1.2 cm at resection.      2.  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  3.  Anastrozole 1 mg daily was initiated on 10/16/2013.  4.  History of fat necrosis above the left breast in November 2013.    HISTORY OF PRESENT ILLNESS:  Jennifer Amos is a 67 y.o. female who returns today for follow up of the above issue.      When she was last here she complained of fatigue after just returning from [place].  A complete metabolic panel and TSH were normal.    She recently returned from [facility].  She is doing well at this point.  She denies any new problems with her breasts.  She is due for a mammogram in June.    PAST MEDICAL, SURGICAL, FAMILY, AND SOCIAL HISTORIES WERE REVIEWED WITH THE PATIENT AND IN THE ELECTRONIC MEDICAL RECORD, AND WERE UPDATED IF INDICATED.    ALLERGIES:  Allergies   Allergen Reactions   • Hydrocodone Shortness Of Breath   • Oxycodone-Acetaminophen Shortness Of Breath   • Percocet [Oxycodone-Acetaminophen] Shortness Of Breath   • Sulfa Antibiotics      Stomach pain   • Penicillins Other (See Comments)     CHILDHOOD REACTION       MEDICATIONS:  The medication list has been reviewed with the patient by the medical assistant, and the list has been updated in the electronic medical record, which I reviewed.  Medication dosages and frequencies were confirmed to be accurate.    REVIEW OF SYSTEMS:  PAIN:  See Vital Signs below.  GENERAL:  No fevers, chills, night sweats, or unintended weight loss.    SKIN:  No rash or non-healing lesions  HEME/LYMPH:  No abnormal bleeding.  No  "palpable lymphadenopathy.  EYES:  No vision changes or diplopia.  ENT:  See history of present illness  RESPIRATORY:  No cough, shortness of breath, hemoptysis, or wheezing.  CARDIOVASCULAR:  No chest pain, palpitations, orthopnea, or dyspnea on exertion.  GASTROINTESTINAL:  No abdominal pain, nausea, vomiting, constipation, diarrhea, melena, or hematochezia.  GENITOURINARY:  Did not complain of urinary symptoms today.  MUSCULOSKELETAL:  No joint pain, swelling, or erythema.  NEUROLOGIC:  No dizziness, loss of consciousness, or seizures.  PSYCHIATRIC:  No depression, anxiety, or mood changes.    Vitals:    03/27/18 1431   BP: 150/90   Pulse: 85   Resp: 14   Temp: 98.8 °F (37.1 °C)   TempSrc: Oral   SpO2: 97%   Weight: 86.7 kg (191 lb 3.2 oz)   Height: 167.6 cm (65.98\")   PainSc: 3  Comment: neck pain     Blood pressure on repeat today was 126/74    PHYSICAL EXAMINATION:  GENERAL:  Well-developed well-nourished female; awake, alert and oriented, in no acute distress.  SKIN:  Warm and dry, without rashes, purpura, or petechiae.  Several small skin tags measuring no more than a couple of millimeters are present on her back and both breasts.   HEAD:  Normocephalic, atraumatic.  EYES:  Pupils equal, round and reactive to light.  Extraocular movements intact.  Conjunctivae normal.  EARS:  Hearing intact.  NOSE:  Septum midline.  No excoriations or nasal discharge.  MOUTH:  No stomatitis or ulcers.  Lips are normal.  THROAT:  Oropharynx without lesions or exudates.  NECK:  Supple with good range of motion; no thyromegaly or masses; no JVD or bruits.  LYMPHATICS:  No cervical, supraclavicular, axillary, or inguinal lymphadenopathy.  CHEST:  Lungs are clear to auscultation bilaterally.  No wheezes, rales, or rhonchi.  BREASTS:  Both breasts were examined today.  Left TRAM flap.  Stable post-surgical changes.  Incisions are well-healed.  No axillary lymphadenopathy.  The right breast is normal without nodules or nipple " discharge.  No change from prior examination.  HEART:  Regular rate; normal rhythm.  No murmurs, gallops or rubs.  ABDOMEN:  Soft, non-tender, non-distended.  Normal active bowel sounds.  No organomegaly.  EXTREMITIES:  No clubbing, cyanosis, or edema.  NEUROLOGICAL:  No focal neurologic deficits.    DIAGNOSTIC DATA:  Lab Results   Component Value Date    WBC 7.97 03/27/2018    HGB 12.7 03/27/2018    HCT 39.3 03/27/2018    MCV 82.6 (L) 03/27/2018     03/27/2018     IMAGING:     CT imaging of the abdomen and pelvis from 4/5/2017 done for hematuria shows no evidence for kidney stone or renal mass.  No findings to explain hematuria.  Otherwise no acute process.    Mammogram on 6/20/2017:    IMPRESSIONS: Benign mammogram showing areas of fat necrosis within the  patient's left breast TRAM flap reconstruction.      BI-RADS category 2-benign findings.    ASSESSMENT:  This is a 67 y.o. female with:  1.  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  2.  History of stage I hormone receptor positive HER-2 negative invasive mammary carcinoma of the lower inner quadrant of the left breast.  She had a left mastectomy with TRAM flap reconstruction.  Anastrozole 1 mg daily was initiated October 2013.  She had a very low Oncotype DX recurrence score.We plan to complete 5 years of therapy in October of this year.  3.  History of fat necrosis of the left breast  4.  Osteopenia:  She gets annual Reclast which was prescribed by Dr. Arriaza, now her new Dr. Padron.    5.  Fatigue: CMP and TSH were normal  6.  Frequent urinary tract infections: She saw Dr. Baires with urogynecology.  I reminded her at her last visit that we discourage the use of any estrogen therapy, even topical estrogen.  We did not discuss this today.      PLAN:   1.  Continue anastrozole 1mg daily with plans to continue this through October of this year.  2.  I will see her back in 6 months for follow-up with a clinical breast exam and a CBC that day.  3.   Mammogram with tomosynthesis in June 2018.  This was ordered today.

## 2018-06-21 ENCOUNTER — HOSPITAL ENCOUNTER (OUTPATIENT)
Dept: MAMMOGRAPHY | Facility: HOSPITAL | Age: 68
Discharge: HOME | End: 2018-06-21
Attending: INTERNAL MEDICINE | Admitting: INTERNAL MEDICINE

## 2018-06-21 DIAGNOSIS — Z85.3 HISTORY OF BREAST CANCER: ICD-10-CM

## 2018-06-21 DIAGNOSIS — Z12.31 ENCOUNTER FOR SCREENING MAMMOGRAM FOR MALIGNANT NEOPLASM OF BREAST: ICD-10-CM

## 2018-06-21 PROCEDURE — 77063 BREAST TOMOSYNTHESIS BI: CPT

## 2018-06-21 PROCEDURE — 77067 SCR MAMMO BI INCL CAD: CPT

## 2018-06-28 ENCOUNTER — TELEPHONE (OUTPATIENT)
Dept: ONCOLOGY | Facility: CLINIC | Age: 68
End: 2018-06-28

## 2018-06-28 NOTE — TELEPHONE ENCOUNTER
Pt calling for mammo results performed one week ago, impression results read to pt from report, no questions or concerns voiced. Pt has f/u with Dr Combs in September

## 2018-09-12 ENCOUNTER — OFFICE VISIT (OUTPATIENT)
Dept: ONCOLOGY | Facility: CLINIC | Age: 68
End: 2018-09-12

## 2018-09-12 ENCOUNTER — APPOINTMENT (OUTPATIENT)
Dept: LAB | Facility: HOSPITAL | Age: 68
End: 2018-09-12

## 2018-09-12 VITALS
WEIGHT: 202.4 LBS | OXYGEN SATURATION: 98 % | HEIGHT: 65 IN | HEART RATE: 81 BPM | SYSTOLIC BLOOD PRESSURE: 132 MMHG | DIASTOLIC BLOOD PRESSURE: 84 MMHG | TEMPERATURE: 98.1 F | BODY MASS INDEX: 33.72 KG/M2 | RESPIRATION RATE: 12 BRPM

## 2018-09-12 DIAGNOSIS — K92.1 HEMATOCHEZIA: ICD-10-CM

## 2018-09-12 DIAGNOSIS — Z85.3 HISTORY OF BREAST CANCER: Primary | ICD-10-CM

## 2018-09-12 DIAGNOSIS — Z85.3 HISTORY OF BREAST CANCER: ICD-10-CM

## 2018-09-12 DIAGNOSIS — D50.9 MICROCYTIC ANEMIA: Primary | ICD-10-CM

## 2018-09-12 LAB
BASOPHILS # BLD AUTO: 0.05 10*3/MM3 (ref 0–0.1)
BASOPHILS NFR BLD AUTO: 0.6 % (ref 0–1.1)
DEPRECATED RDW RBC AUTO: 45.8 FL (ref 37–49)
EOSINOPHIL # BLD AUTO: 0.18 10*3/MM3 (ref 0–0.36)
EOSINOPHIL NFR BLD AUTO: 2.3 % (ref 1–5)
ERYTHROCYTE [DISTWIDTH] IN BLOOD BY AUTOMATED COUNT: 16.1 % (ref 11.7–14.5)
FERRITIN SERPL-MCNC: 10.5 NG/ML
HCT VFR BLD AUTO: 35.8 % (ref 34–45)
HGB BLD-MCNC: 11.3 G/DL (ref 11.5–14.9)
HGB RETIC QN: 26.9 PG (ref 29.8–36.1)
IMM GRANULOCYTES # BLD: 0.03 10*3/MM3 (ref 0–0.03)
IMM GRANULOCYTES NFR BLD: 0.4 % (ref 0–0.5)
IMM RETICS NFR: 21.5 % (ref 3–15.8)
IRON 24H UR-MRATE: 50 MCG/DL (ref 37–145)
IRON SATN MFR SERPL: 11 % (ref 14–48)
LYMPHOCYTES # BLD AUTO: 2.32 10*3/MM3 (ref 1–3.5)
LYMPHOCYTES NFR BLD AUTO: 30.1 % (ref 20–49)
MCH RBC QN AUTO: 24.8 PG (ref 27–33)
MCHC RBC AUTO-ENTMCNC: 31.6 G/DL (ref 32–35)
MCV RBC AUTO: 78.5 FL (ref 83–97)
MONOCYTES # BLD AUTO: 0.63 10*3/MM3 (ref 0.25–0.8)
MONOCYTES NFR BLD AUTO: 8.2 % (ref 4–12)
NEUTROPHILS # BLD AUTO: 4.5 10*3/MM3 (ref 1.5–7)
NEUTROPHILS NFR BLD AUTO: 58.4 % (ref 39–75)
NRBC BLD MANUAL-RTO: 0 /100 WBC (ref 0–0)
PLATELET # BLD AUTO: 192 10*3/MM3 (ref 150–375)
PMV BLD AUTO: 11.5 FL (ref 8.9–12.1)
RBC # BLD AUTO: 4.56 10*6/MM3 (ref 3.9–5)
RETICS/RBC NFR AUTO: 1.42 % (ref 0.6–2)
TIBC SERPL-MCNC: 475 MCG/DL (ref 249–505)
TRANSFERRIN SERPL-MCNC: 339 MG/DL (ref 200–360)
VIT B12 BLD-MCNC: 868 PG/ML (ref 211–946)
WBC NRBC COR # BLD: 7.71 10*3/MM3 (ref 4–10)

## 2018-09-12 PROCEDURE — 99214 OFFICE O/P EST MOD 30 MIN: CPT | Performed by: INTERNAL MEDICINE

## 2018-09-12 PROCEDURE — 84466 ASSAY OF TRANSFERRIN: CPT | Performed by: INTERNAL MEDICINE

## 2018-09-12 PROCEDURE — 85025 COMPLETE CBC W/AUTO DIFF WBC: CPT | Performed by: INTERNAL MEDICINE

## 2018-09-12 PROCEDURE — 36415 COLL VENOUS BLD VENIPUNCTURE: CPT | Performed by: INTERNAL MEDICINE

## 2018-09-12 PROCEDURE — 36416 COLLJ CAPILLARY BLOOD SPEC: CPT | Performed by: INTERNAL MEDICINE

## 2018-09-12 PROCEDURE — 85046 RETICYTE/HGB CONCENTRATE: CPT | Performed by: INTERNAL MEDICINE

## 2018-09-12 PROCEDURE — 82607 VITAMIN B-12: CPT | Performed by: INTERNAL MEDICINE

## 2018-09-12 PROCEDURE — 82728 ASSAY OF FERRITIN: CPT | Performed by: INTERNAL MEDICINE

## 2018-09-12 PROCEDURE — 83540 ASSAY OF IRON: CPT | Performed by: INTERNAL MEDICINE

## 2018-09-12 RX ORDER — LEVOTHYROXINE SODIUM 0.1 MG/1
100 TABLET ORAL DAILY
Refills: 3 | COMMUNITY
Start: 2018-07-12

## 2018-09-12 NOTE — PROGRESS NOTES
[facility] OUTPATIENT CLINIC FOLLOW UP VISIT    REASON FOR FOLLOW-UP:    1.  History of stage I a (pT1b, N0) hormone receptor positive HER-2 negative invasive mammary carcinoma of the lower inner quadrant of the left breast.  11 mm abnormality by imaging.  7 mm invasive carcinoma at biopsy.  She had a left mastectomy with TRAM flap reconstruction.  5 mm grade 2 tumor at resection.  DCIS measured 1.2 cm at resection.      2.  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  3.  Anastrozole 1 mg daily was initiated on 10/16/2013.  4.  History of fat necrosis above the left breast in November 2013.    HISTORY OF PRESENT ILLNESS:  Jennifer Amos is a 68 y.o. female who returns today for follow up of the above issue.      She recently traveled to [place] and then came back to [place] on the [facility].  She feels well.  She complains of weight gain.  She has some arthralgias.  She has had some intermittent bright red blood per rectum.  She is due for colonoscopy.  She denies any new problems with her breasts.    PAST MEDICAL, SURGICAL, FAMILY, AND SOCIAL HISTORIES WERE REVIEWED WITH THE PATIENT AND IN THE ELECTRONIC MEDICAL RECORD, AND WERE UPDATED IF INDICATED.    ALLERGIES:  Allergies   Allergen Reactions   • Hydrocodone Shortness Of Breath   • Oxycodone-Acetaminophen Shortness Of Breath   • Percocet [Oxycodone-Acetaminophen] Shortness Of Breath   • Sulfa Antibiotics      Stomach pain   • Penicillins Other (See Comments)     CHILDHOOD REACTION       MEDICATIONS:  The medication list has been reviewed with the patient by the medical assistant, and the list has been updated in the electronic medical record, which I reviewed.  Medication dosages and frequencies were confirmed to be accurate.    REVIEW OF SYSTEMS:  PAIN:  See Vital Signs below.  GENERAL:  No fevers, chills, night sweats, or unintended weight loss.    SKIN:  No rash or non-healing lesions  HEME/LYMPH:  No abnormal bleeding.  " No palpable lymphadenopathy.  EYES:  No vision changes or diplopia.  ENT:  See history of present illness  RESPIRATORY:  No cough, shortness of breath, hemoptysis, or wheezing.  CARDIOVASCULAR:  No chest pain, palpitations, orthopnea, or dyspnea on exertion.  GASTROINTESTINAL:  No abdominal pain, nausea, vomiting, constipation, diarrhea, melena.  Occasional hematochezia.  GENITOURINARY:  Did not complain of urinary symptoms today.  MUSCULOSKELETAL:  No joint pain, swelling, or erythema.  NEUROLOGIC:  No dizziness, loss of consciousness, or seizures.  PSYCHIATRIC:  No depression, anxiety, or mood changes.    Vitals:    09/12/18 1301   BP: 132/84   Pulse: 81   Resp: 12   Temp: 98.1 °F (36.7 °C)   TempSrc: Oral   SpO2: 98%   Weight: 91.8 kg (202 lb 6.4 oz)   Height: 166 cm (65.35\")   PainSc: 0-No pain       PHYSICAL EXAMINATION:  GENERAL:  Well-developed well-nourished female; awake, alert and oriented, in no acute distress.  SKIN:  Warm and dry, without rashes, purpura, or petechiae.  Several small skin tags measuring no more than a couple of millimeters are present on her back and both breasts.   HEAD:  Normocephalic, atraumatic.  EYES:  Pupils equal, round and reactive to light.  Extraocular movements intact.  Conjunctivae normal.  EARS:  Hearing intact.  NOSE:  Septum midline.  No excoriations or nasal discharge.  MOUTH:  No stomatitis or ulcers.  Lips are normal.  THROAT:  Oropharynx without lesions or exudates.  NECK:  Supple with good range of motion; no thyromegaly or masses; no JVD or bruits.  LYMPHATICS:  No cervical, supraclavicular, axillary, or inguinal lymphadenopathy.  CHEST:  Lungs are clear to auscultation bilaterally.  No wheezes, rales, or rhonchi.  BREASTS:  Both breasts were examined today.  Left TRAM flap.  Stable post-surgical changes.  Incisions are well-healed.  No axillary lymphadenopathy.  The right breast is normal without nodules or nipple discharge.  No change from prior " examination.  HEART:  Regular rate; normal rhythm.  No murmurs, gallops or rubs.  ABDOMEN:  Soft, non-tender, non-distended.  Normal active bowel sounds.  No organomegaly.  EXTREMITIES:  No clubbing, cyanosis, or edema.  NEUROLOGICAL:  No focal neurologic deficits.  No change from prior examination    DIAGNOSTIC DATA:  Results for orders placed or performed in visit on 09/12/18   CBC Auto Differential   Result Value Ref Range    WBC 7.71 4.00 - 10.00 10*3/mm3    RBC 4.56 3.90 - 5.00 10*6/mm3    Hemoglobin 11.3 (L) 11.5 - 14.9 g/dL    Hematocrit 35.8 34.0 - 45.0 %    MCV 78.5 (L) 83.0 - 97.0 fL    MCH 24.8 (L) 27.0 - 33.0 pg    MCHC 31.6 (L) 32.0 - 35.0 g/dL    RDW 16.1 (H) 11.7 - 14.5 %    RDW-SD 45.8 37.0 - 49.0 fl    MPV 11.5 8.9 - 12.1 fL    Platelets 192 150 - 375 10*3/mm3    Neutrophil % 58.4 39.0 - 75.0 %    Lymphocyte % 30.1 20.0 - 49.0 %    Monocyte % 8.2 4.0 - 12.0 %    Eosinophil % 2.3 1.0 - 5.0 %    Basophil % 0.6 0.0 - 1.1 %    Immature Grans % 0.4 0.0 - 0.5 %    Neutrophils, Absolute 4.50 1.50 - 7.00 10*3/mm3    Lymphocytes, Absolute 2.32 1.00 - 3.50 10*3/mm3    Monocytes, Absolute 0.63 0.25 - 0.80 10*3/mm3    Eosinophils, Absolute 0.18 0.00 - 0.36 10*3/mm3    Basophils, Absolute 0.05 0.00 - 0.10 10*3/mm3    Immature Grans, Absolute 0.03 0.00 - 0.03 10*3/mm3    nRBC 0.0 0.0 - 0.0 /100 WBC     IMAGING:     CT imaging of the abdomen and pelvis from 4/5/2017 done for hematuria shows no evidence for kidney stone or renal mass.  No findings to explain hematuria.  Otherwise no acute process.    Mammogram on 6/20/2017:    IMPRESSIONS: Benign mammogram showing areas of fat necrosis within the  patient's left breast TRAM flap reconstruction.      BI-RADS category 2-benign findings.    Mammogram 6/21/2018:  IMPRESSION:  There are no findings suspicious for malignancy in the right  breast or left TRAM flap. Routine follow-up mammography is recommended.     BI-RADS Category 1: Negative.    ASSESSMENT:  This is a 68  y.o. female with:  1.  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  2.  History of stage I hormone receptor positive HER-2 negative invasive mammary carcinoma of the lower inner quadrant of the left breast.  She had a left mastectomy with TRAM flap reconstruction.  Anastrozole 1 mg daily was initiated October 2013.  She had a very low Oncotype DX recurrence score.We plan to complete 5 years of therapy in October of this year.  3.  History of fat necrosis of the left breast  4.  Osteopenia:  She gets annual Reclast which was prescribed by Dr. Arriaza, now her new PCP Dr. Padron.    5.  Fatigue: CMP and TSH were normal  6.  Frequent urinary tract infections: She saw Dr. Baires with urogynecology.    7.  Microcytic anemia: New issue today.  She apparently has had some hematochezia.  We will check reticulocyte count, ferritin, iron profile, and a vitamin B12 level.  She is due for colonoscopy and once we see with the iron levels show we will likely refer her to gastroenterology for endoscopy.      PLAN:   1.  Continue anastrozole 1mg daily with plans to continue this through October of this year.  2.  Anemia evaluation as discussed above  3.  I will see her back in 6 months for follow-up with a clinical breast exam and a CBC that day.  She was to keep seeing me every 6 months.  4.  Mammogram with tomosynthesis in June 2019.    ADDENDUM:  Iron studies are consistent with iron deficiency with a ferritin of 10.5, iron 50 with 11% iron saturation, reticulocytes 1.42% with a reticulated hemoglobin of 26.9.  Discussed with her by phone.  Started ferrous sulfate 325 mg twice daily with vitamin C.  Prescription sent to her pharmacy.  Referred to gastroenterology.  I will see her back in a couple of months for follow-up with repeat iron labs.

## 2018-09-14 ENCOUNTER — TELEPHONE (OUTPATIENT)
Dept: ONCOLOGY | Facility: CLINIC | Age: 68
End: 2018-09-14

## 2018-09-14 ENCOUNTER — TELEPHONE (OUTPATIENT)
Dept: SURGERY | Facility: CLINIC | Age: 68
End: 2018-09-14

## 2018-09-14 NOTE — TELEPHONE ENCOUNTER
----- Message from Melany Harden sent at 9/14/2018 10:18 AM EDT -----  [phone]  [phone]    Eye medicine has not been called in yet from 2 days ago.

## 2018-09-14 NOTE — TELEPHONE ENCOUNTER
Note from Dr. Combs dated September 12, 2018.  Continue Arimidex 1 mg daily through October of this year.  Anemia evaluation.  See her back in 6 months.

## 2018-09-18 DIAGNOSIS — D50.9 IRON DEFICIENCY ANEMIA, UNSPECIFIED IRON DEFICIENCY ANEMIA TYPE: Primary | ICD-10-CM

## 2018-09-18 RX ORDER — SODIUM CHLORIDE, SODIUM LACTATE, POTASSIUM CHLORIDE, CALCIUM CHLORIDE 600; 310; 30; 20 MG/100ML; MG/100ML; MG/100ML; MG/100ML
30 INJECTION, SOLUTION INTRAVENOUS CONTINUOUS
Status: CANCELLED | OUTPATIENT
Start: 2018-11-05

## 2018-09-21 ENCOUNTER — TELEPHONE (OUTPATIENT)
Dept: ONCOLOGY | Facility: HOSPITAL | Age: 68
End: 2018-09-21

## 2018-09-21 NOTE — TELEPHONE ENCOUNTER
Pt making sure it was ok that her appnt for colonoscopy was 3 weeks out. Told her this was fine. No other questions or concern.s     ----- Message from Melany Harden sent at 9/21/2018 10:57 AM EDT -----  [phone]/ [phone]  Re; colonoscopy that Dr. Combs wanted her to have

## 2018-10-23 PROBLEM — D50.9 IRON DEFICIENCY ANEMIA: Status: ACTIVE | Noted: 2018-10-23

## 2018-10-24 ENCOUNTER — TELEPHONE (OUTPATIENT)
Dept: ONCOLOGY | Facility: HOSPITAL | Age: 68
End: 2018-10-24

## 2018-10-24 NOTE — TELEPHONE ENCOUNTER
----- Message from Nguyễn Mcmahon sent at 10/24/2018 10:51 AM EDT -----  Contact: [phone]  Pt has some hearing loss  Wants to know when to come off Anastrozole. Stated say an audiologist and they attribute her hearing loss to this medication. In basket message to Dr Combs. Instructed patient we would call her back.

## 2018-10-26 ENCOUNTER — TELEPHONE (OUTPATIENT)
Dept: ONCOLOGY | Facility: HOSPITAL | Age: 68
End: 2018-10-26

## 2018-10-26 NOTE — TELEPHONE ENCOUNTER
----- Message from Jeremi Combs MD sent at 10/24/2018  1:05 PM EDT -----  She is supposed to stop it this month anyway, so she can go ahead and stop it now.  [facility]    ----- Message -----  From: Ruba España RN  Sent: 10/24/2018  11:10 AM  To: Jeremi Combs MD    Patient has some hearing loss and went to an audiologist and they attribute to Anastrozole. Wants to know when she should come off this medication.  Instructed patient verbalized understanding.

## 2018-11-05 ENCOUNTER — ANESTHESIA EVENT (OUTPATIENT)
Dept: GASTROENTEROLOGY | Facility: HOSPITAL | Age: 68
End: 2018-11-05

## 2018-11-05 ENCOUNTER — HOSPITAL ENCOUNTER (OUTPATIENT)
Facility: HOSPITAL | Age: 68
Setting detail: HOSPITAL OUTPATIENT SURGERY
Discharge: HOME | End: 2018-11-05
Attending: INTERNAL MEDICINE | Admitting: INTERNAL MEDICINE

## 2018-11-05 ENCOUNTER — ANESTHESIA (OUTPATIENT)
Dept: GASTROENTEROLOGY | Facility: HOSPITAL | Age: 68
End: 2018-11-05

## 2018-11-05 VITALS
SYSTOLIC BLOOD PRESSURE: 128 MMHG | BODY MASS INDEX: 31.66 KG/M2 | HEART RATE: 76 BPM | TEMPERATURE: 98.1 F | RESPIRATION RATE: 16 BRPM | DIASTOLIC BLOOD PRESSURE: 78 MMHG | HEIGHT: 66 IN | WEIGHT: 197 LBS | OXYGEN SATURATION: 96 %

## 2018-11-05 DIAGNOSIS — D50.9 IRON DEFICIENCY ANEMIA, UNSPECIFIED IRON DEFICIENCY ANEMIA TYPE: ICD-10-CM

## 2018-11-05 DIAGNOSIS — Z86.010 HX OF COLONIC POLYPS: ICD-10-CM

## 2018-11-05 PROCEDURE — 45380 COLONOSCOPY AND BIOPSY: CPT | Performed by: INTERNAL MEDICINE

## 2018-11-05 PROCEDURE — S0260 H&P FOR SURGERY: HCPCS | Performed by: INTERNAL MEDICINE

## 2018-11-05 PROCEDURE — 25010000002 PROPOFOL 10 MG/ML EMULSION: Performed by: ANESTHESIOLOGY

## 2018-11-05 PROCEDURE — 25010000002 PROPOFOL 1000 MG/ML EMULSION: Performed by: ANESTHESIOLOGY

## 2018-11-05 PROCEDURE — 88305 TISSUE EXAM BY PATHOLOGIST: CPT | Performed by: INTERNAL MEDICINE

## 2018-11-05 PROCEDURE — 43239 EGD BIOPSY SINGLE/MULTIPLE: CPT | Performed by: INTERNAL MEDICINE

## 2018-11-05 RX ORDER — PROPOFOL 10 MG/ML
VIAL (ML) INTRAVENOUS AS NEEDED
Status: DISCONTINUED | OUTPATIENT
Start: 2018-11-05 | End: 2018-11-05 | Stop reason: SURG

## 2018-11-05 RX ORDER — EPHEDRINE SULFATE 50 MG/ML
5 INJECTION, SOLUTION INTRAVENOUS ONCE AS NEEDED
Status: DISCONTINUED | OUTPATIENT
Start: 2018-11-05 | End: 2018-11-05 | Stop reason: HOSPADM

## 2018-11-05 RX ORDER — LIDOCAINE HYDROCHLORIDE 20 MG/ML
INJECTION, SOLUTION INFILTRATION; PERINEURAL AS NEEDED
Status: DISCONTINUED | OUTPATIENT
Start: 2018-11-05 | End: 2018-11-05 | Stop reason: SURG

## 2018-11-05 RX ORDER — LABETALOL HYDROCHLORIDE 5 MG/ML
5 INJECTION, SOLUTION INTRAVENOUS
Status: DISCONTINUED | OUTPATIENT
Start: 2018-11-05 | End: 2018-11-05 | Stop reason: HOSPADM

## 2018-11-05 RX ORDER — DIPHENHYDRAMINE HCL 25 MG
25 CAPSULE ORAL EVERY 6 HOURS PRN
Status: DISCONTINUED | OUTPATIENT
Start: 2018-11-05 | End: 2018-11-05 | Stop reason: HOSPADM

## 2018-11-05 RX ORDER — SODIUM CHLORIDE 0.9 % (FLUSH) 0.9 %
3-10 SYRINGE (ML) INJECTION AS NEEDED
Status: DISCONTINUED | OUTPATIENT
Start: 2018-11-05 | End: 2018-11-05 | Stop reason: HOSPADM

## 2018-11-05 RX ORDER — FENTANYL CITRATE 50 UG/ML
25 INJECTION, SOLUTION INTRAMUSCULAR; INTRAVENOUS
Status: DISCONTINUED | OUTPATIENT
Start: 2018-11-05 | End: 2018-11-05 | Stop reason: HOSPADM

## 2018-11-05 RX ORDER — SODIUM CHLORIDE, SODIUM LACTATE, POTASSIUM CHLORIDE, CALCIUM CHLORIDE 600; 310; 30; 20 MG/100ML; MG/100ML; MG/100ML; MG/100ML
30 INJECTION, SOLUTION INTRAVENOUS CONTINUOUS
Status: DISCONTINUED | OUTPATIENT
Start: 2018-11-05 | End: 2018-11-05 | Stop reason: HOSPADM

## 2018-11-05 RX ORDER — SODIUM CHLORIDE 0.9 % (FLUSH) 0.9 %
3 SYRINGE (ML) INJECTION EVERY 12 HOURS SCHEDULED
Status: DISCONTINUED | OUTPATIENT
Start: 2018-11-05 | End: 2018-11-05 | Stop reason: HOSPADM

## 2018-11-05 RX ORDER — ONDANSETRON 2 MG/ML
4 INJECTION INTRAMUSCULAR; INTRAVENOUS ONCE AS NEEDED
Status: DISCONTINUED | OUTPATIENT
Start: 2018-11-05 | End: 2018-11-05 | Stop reason: HOSPADM

## 2018-11-05 RX ORDER — NALOXONE HCL 0.4 MG/ML
0.4 VIAL (ML) INJECTION AS NEEDED
Status: DISCONTINUED | OUTPATIENT
Start: 2018-11-05 | End: 2018-11-05 | Stop reason: HOSPADM

## 2018-11-05 RX ADMIN — LIDOCAINE HYDROCHLORIDE 60 MG: 20 INJECTION, SOLUTION INFILTRATION; PERINEURAL at 14:16

## 2018-11-05 RX ADMIN — SODIUM CHLORIDE, POTASSIUM CHLORIDE, SODIUM LACTATE AND CALCIUM CHLORIDE 30 ML/HR: 600; 310; 30; 20 INJECTION, SOLUTION INTRAVENOUS at 14:04

## 2018-11-05 RX ADMIN — PROPOFOL 200 MCG/KG/MIN: 10 INJECTION, EMULSION INTRAVENOUS at 14:16

## 2018-11-05 RX ADMIN — PROPOFOL 120 MG: 10 INJECTION, EMULSION INTRAVENOUS at 14:16

## 2018-11-05 NOTE — H&P
[facility]  Pre Procedure History & Physical    Chief Complaint:  Hx of polyps, rectal bleeding, iron deficiency anemia      HPI: 67 yo W with PMH as below here for EGD and colonoscopy.  She is followed by Dr Combs at [facility] and was recently found to have a new JOHN.  No family history of GI malignancy.  She does have a history of polyps.    Past Medical History:   Past Medical History:   Diagnosis Date   • Acid reflux    • Arthritis    • Breast cancer (CMS/HCC)     Left, stage IA ([id number]) ER/WI positive, HER2/negative, invasive mammary carcinoma    • Depression    • Disease of thyroid gland    • Fat necrosis     Superior to the left breast, benign per biopsy   • H/O Left calf vein thrombosis 10/2011   • History of anemia     Mild   • History of colon polyps    • History of fall     With left soulder and knee injuries   • History of osteopenia    • Hypercholesteremia    • Hypothyroidism    • Incisional hernia     Of the abdomen status post repair by Dr. Zachary Brown Jr.   • Joint pain    • Meniscus tear     LEFT KNEE   • Sarcoidosis     DORMANT       Family History:  Family History   Problem Relation Age of Onset   • Breast cancer Paternal Aunt 55   • Deep vein thrombosis Other    • Heart disease Other    • Hypertension Other    • Diabetes Other    • Cancer Other    • Kidney disease Other    • Glaucoma Other    • Heart failure Mother    • Hypertension Mother    • Breast cancer Maternal Aunt 65   • Breast cancer Cousin    • Stroke Father    • Alcohol abuse Father    • Thyroid disease Sister    • Depression Sister        Social History:   reports that she has never smoked. She has never used smokeless tobacco. She reports that she does not drink alcohol or use drugs.    Medications:   Prescriptions Prior to Admission   Medication Sig Dispense Refill Last Dose   • anastrozole (ARIMIDEX) 1 MG tablet Take 1 tablet by mouth Daily. 90 tablet 3 Taking   • ASPIRIN PO Take 81 mg by mouth daily.    Taking   • Calcium Carb-Cholecalciferol 600-500 MG-UNIT capsule Take  by mouth. HOLD PRIOR TO SURGERY   Taking   • Cholecalciferol (VITAMIN D3) 1000 UNITS capsule Take  by mouth daily.   Taking   • DULoxetine (CYMBALTA) 60 MG capsule TAKE 1 CAPSULE BY MOUTH DAILY 90 capsule 1 Taking   • EPIPEN 2-DON 0.3 MG/0.3ML solution auto-injector injection TAKE AS NEEDED FOR SYNCOPE OR SHORTNESS OF AIR 2 each 0 Taking   • esomeprazole (nexIUM) 40 MG capsule TAKE 1 CAPSULE EVERY MORNING BEFORE BREAKFAST 90 capsule 0 Taking   • levothyroxine (SYNTHROID, LEVOTHROID) 100 MCG tablet Take 100 mcg by mouth Daily.  3 Taking   • lovastatin (MEVACOR) 40 MG tablet TAKE 1 TABLET BY MOUTH EVERY NIGHT 90 tablet 0 Taking   • Multiple Vitamins-Minerals (CENTRUM SILVER PO) Take  by mouth.   Taking       Allergies:  Hydrocodone; Oxycodone-acetaminophen; Percocet [oxycodone-acetaminophen]; Sulfa antibiotics; and Penicillins    ROS:    Pertinent items are noted in HPI     Objective     There were no vitals taken for this visit.    Physical Exam   Constitutional: Pt is oriented to person, place, and time and well-developed, well-nourished, and in no distress.   HENT:   Mouth/Throat: Oropharynx is clear and moist.   Neck: Normal range of motion. Neck supple.   Cardiovascular: Normal rate, regular rhythm and normal heart sounds.    Pulmonary/Chest: Effort normal and breath sounds normal. No respiratory distress. No  wheezes.   Abdominal: Soft. Bowel sounds are normal.   Skin: Skin is warm and dry.   Psychiatric: Mood, memory, affect and judgment normal.     Assessment/Plan     Diagnosis: Hx of polyps, rectal bleeding, iron deficiency anemia      Anticipated Surgical Procedure:  EGD  Colonoscopy    The risks, benefits, and alternatives of this procedure have been discussed with the patient or the responsible party- the patient understands and agrees to proceed.

## 2018-11-05 NOTE — ANESTHESIA PREPROCEDURE EVALUATION
Anesthesia Evaluation     no history of anesthetic complications:               Airway   Mallampati: II  Neck ROM: full  no difficulty expected  Dental - normal exam     Pulmonary - negative pulmonary ROS and normal exam   (-) COPD, asthma, sleep apnea, not a smoker    PE comment: nonlabored  Cardiovascular - normal exam    Rhythm: regular  Rate: normal    (+) hyperlipidemia,   (-) hypertension, valvular problems/murmurs, past MI, CAD, dysrhythmias, angina      Neuro/Psych- negative ROS  (-) seizures, TIA, CVA  GI/Hepatic/Renal/Endo    (+)  GERD,  hypothyroidism,   (-) liver disease, diabetes, hyperthyroidism    ROS Comment: hematochezia    Musculoskeletal     (+) arthralgias,   Abdominal    Substance History      OB/GYN          Other   (+) blood dyscrasia (microcytic anemia), arthritis   history of cancer (Breast Cancer)                    Anesthesia Plan    ASA 3     MAC     Anesthetic plan, all risks, benefits, and alternatives have been provided, discussed and informed consent has been obtained with: patient.

## 2018-11-05 NOTE — ANESTHESIA POSTPROCEDURE EVALUATION
"Patient: Jennifer Amos    Procedure Summary     Date:  11/05/18 Room / Location:   [facility]    Anesthesia Start:  1411 Anesthesia Stop:  1501    Procedures:       COLONOSCOPY to cecum  and TI:  biopsy polypectomies (N/A )      ESOPHAGOGASTRODUODENOSCOPY with biospsies and polypectomy (N/A Esophagus) Diagnosis:       Iron deficiency anemia, unspecified iron deficiency anemia type      (Iron deficiency anemia, unspecified iron deficiency anemia type [D50.9])    Surgeon:  Briseyda Simpson MD Provider:  Jeremi Cote MD    Anesthesia Type:  MAC ASA Status:  3          Anesthesia Type: MAC  Last vitals  BP   120/81 (11/05/18 1459)   Temp   36.7 °C (98.1 °F) (11/05/18 1356)   Pulse   83 (11/05/18 1459)   Resp   16 (11/05/18 1459)     SpO2   94 % (11/05/18 1459)     Post Anesthesia Care and Evaluation    Patient location during evaluation: bedside  Patient participation: complete - patient participated  Level of consciousness: awake  Pain management: adequate  Airway patency: patent  Anesthetic complications: No anesthetic complications    Cardiovascular status: acceptable  Respiratory status: acceptable  Hydration status: acceptable    Comments: */81 (BP Location: Left arm, Patient Position: Lying)   Pulse 83   Temp 36.7 °C (98.1 °F) (Oral)   Resp 16   Ht 167.6 cm (66\")   Wt 89.4 kg (197 lb)   SpO2 94%   BMI 31.80 kg/m²         "

## 2018-11-07 DIAGNOSIS — B96.81 HELICOBACTER PYLORI GASTRITIS: Primary | ICD-10-CM

## 2018-11-07 DIAGNOSIS — K29.70 HELICOBACTER PYLORI GASTRITIS: Primary | ICD-10-CM

## 2018-11-07 LAB
CYTO UR: NORMAL
LAB AP CASE REPORT: NORMAL
PATH REPORT.FINAL DX SPEC: NORMAL
PATH REPORT.GROSS SPEC: NORMAL

## 2018-11-07 RX ORDER — DOXYCYCLINE HYCLATE 100 MG/1
100 TABLET, DELAYED RELEASE ORAL 2 TIMES DAILY
Qty: 28 TABLET | Refills: 0 | Status: SHIPPED | OUTPATIENT
Start: 2018-11-07 | End: 2018-11-16 | Stop reason: ALTCHOICE

## 2018-11-07 RX ORDER — ESOMEPRAZOLE MAGNESIUM 40 MG/1
40 CAPSULE, DELAYED RELEASE ORAL
Qty: 14 CAPSULE | Refills: 0 | Status: SHIPPED | OUTPATIENT
Start: 2018-11-07 | End: 2018-11-16 | Stop reason: ALTCHOICE

## 2018-11-07 RX ORDER — METRONIDAZOLE 250 MG/1
250 TABLET ORAL 4 TIMES DAILY
Qty: 56 TABLET | Refills: 0 | Status: SHIPPED | OUTPATIENT
Start: 2018-11-07 | End: 2018-11-14

## 2018-11-08 ENCOUNTER — TELEPHONE (OUTPATIENT)
Dept: GASTROENTEROLOGY | Facility: CLINIC | Age: 68
End: 2018-11-08

## 2018-11-08 NOTE — TELEPHONE ENCOUNTER
Call to pt.  Advise per DR Simpson that bx from stomach shows that has a bacterial infection with H pylori.  Meds have been ordered to tx this.  Needs to take as prescribed for the full course.  Will need test for cure in about 6 weeks after completing med.  Will need to stop nexium at that time.    2 polyps removed from colon were tubular adenomas (not cancerous, but precancerous).  Next c/s should be in 5 yrs.    Lab appt scheduled for 1/7/19 @ 1pm.  Instruct pt NPO 1 hour prior to test, no abx, ppi, bismuth 2 weeks prior to testing.  May use h2 blocker such as tagamet, zantac, pepcid prn reflux.  Verb understanding.    Pt states understood that may need capsule study - asking about this.  Message to Dr Simpson.    C/s for 11/5/23 placed in recall.

## 2018-11-08 NOTE — TELEPHONE ENCOUNTER
Call to spouse, Irving (see [employer] auth).  Advise per DR Simpson that sometimes the bacterial infection can cause issues with absorbing iron.    Reasonable to see how pt does with anemia following tx of H pylori infectoin.    If into maintaining hgb and iron stores following tx, then will need to pursue study after that point.  Irving hensley understanding.

## 2018-11-08 NOTE — TELEPHONE ENCOUNTER
----- Message from Raheel Hayward sent at 11/8/2018  9:24 AM EST -----  Regarding: Medication   Contact: [phone]  Pt is calling about 4 medication that was sent to her pharm yesterday, pt has questions on what they are ?

## 2018-11-08 NOTE — TELEPHONE ENCOUNTER
Sometimes the bacterial infection can cause issues with absorbing iron.    I think it is reasonable to see how she does with her anemia following treating her H. pylori infection.    If she is not maintaining her hemoglobin and iron stores following treatment, then we will need to pursue capsule study after that point.

## 2018-11-08 NOTE — TELEPHONE ENCOUNTER
----- Message from Briseyda Simpson MD sent at 11/7/2018 12:45 PM EST -----  The biopsies from her stomach shows that she has a bacterial infection with H. pylori.  I am ordering her medication to treat this.  She needs to take it as prescribed in for the full course.  She will need a test of cure in about 6 weeks after she has completed all the medication.  She will need to stop the nexium at that time. I have entered the lab request.    The 2 polyps removed from her colon were tubular adenomas.  Her next colonoscopy should be in 5 years, please place in recall.  Thanks.

## 2018-11-09 ENCOUNTER — DOCUMENTATION (OUTPATIENT)
Dept: GASTROENTEROLOGY | Facility: CLINIC | Age: 68
End: 2018-11-09

## 2018-11-13 ENCOUNTER — DOCUMENTATION (OUTPATIENT)
Dept: GASTROENTEROLOGY | Facility: CLINIC | Age: 68
End: 2018-11-13

## 2018-11-14 ENCOUNTER — LAB (OUTPATIENT)
Dept: LAB | Facility: HOSPITAL | Age: 68
End: 2018-11-14

## 2018-11-14 ENCOUNTER — OFFICE VISIT (OUTPATIENT)
Dept: ONCOLOGY | Facility: CLINIC | Age: 68
End: 2018-11-14

## 2018-11-14 ENCOUNTER — TELEPHONE (OUTPATIENT)
Dept: ONCOLOGY | Facility: HOSPITAL | Age: 68
End: 2018-11-14

## 2018-11-14 VITALS
OXYGEN SATURATION: 97 % | RESPIRATION RATE: 16 BRPM | HEIGHT: 65 IN | TEMPERATURE: 98.5 F | HEART RATE: 85 BPM | BODY MASS INDEX: 32.14 KG/M2 | SYSTOLIC BLOOD PRESSURE: 130 MMHG | DIASTOLIC BLOOD PRESSURE: 80 MMHG | WEIGHT: 192.9 LBS

## 2018-11-14 DIAGNOSIS — D50.9 IRON DEFICIENCY ANEMIA, UNSPECIFIED IRON DEFICIENCY ANEMIA TYPE: Primary | ICD-10-CM

## 2018-11-14 DIAGNOSIS — D50.9 MICROCYTIC ANEMIA: ICD-10-CM

## 2018-11-14 LAB
BASOPHILS # BLD AUTO: 0.04 10*3/MM3 (ref 0–0.1)
BASOPHILS NFR BLD AUTO: 0.7 % (ref 0–1.1)
DEPRECATED RDW RBC AUTO: 60.3 FL (ref 37–49)
EOSINOPHIL # BLD AUTO: 0.05 10*3/MM3 (ref 0–0.36)
EOSINOPHIL NFR BLD AUTO: 0.8 % (ref 1–5)
ERYTHROCYTE [DISTWIDTH] IN BLOOD BY AUTOMATED COUNT: 20 % (ref 11.7–14.5)
FERRITIN SERPL-MCNC: 35.6 NG/ML
HCT VFR BLD AUTO: 44.3 % (ref 34–45)
HGB BLD-MCNC: 14.1 G/DL (ref 11.5–14.9)
HGB RETIC QN: 32.7 PG (ref 29.8–36.1)
IMM GRANULOCYTES # BLD: 0.02 10*3/MM3 (ref 0–0.03)
IMM GRANULOCYTES NFR BLD: 0.3 % (ref 0–0.5)
IMM RETICS NFR: 9 % (ref 3–15.8)
IRON 24H UR-MRATE: 66 MCG/DL (ref 37–145)
IRON SATN MFR SERPL: 15 % (ref 14–48)
LYMPHOCYTES # BLD AUTO: 2.07 10*3/MM3 (ref 1–3.5)
LYMPHOCYTES NFR BLD AUTO: 33.9 % (ref 20–49)
MCH RBC QN AUTO: 27.1 PG (ref 27–33)
MCHC RBC AUTO-ENTMCNC: 31.8 G/DL (ref 32–35)
MCV RBC AUTO: 85 FL (ref 83–97)
MONOCYTES # BLD AUTO: 0.5 10*3/MM3 (ref 0.25–0.8)
MONOCYTES NFR BLD AUTO: 8.2 % (ref 4–12)
NEUTROPHILS # BLD AUTO: 3.42 10*3/MM3 (ref 1.5–7)
NEUTROPHILS NFR BLD AUTO: 56.1 % (ref 39–75)
NRBC BLD MANUAL-RTO: 0 /100 WBC (ref 0–0)
PLATELET # BLD AUTO: 217 10*3/MM3 (ref 150–375)
PMV BLD AUTO: 11.6 FL (ref 8.9–12.1)
RBC # BLD AUTO: 5.21 10*6/MM3 (ref 3.9–5)
RETICS/RBC NFR AUTO: 1.28 % (ref 0.6–2)
TIBC SERPL-MCNC: 447 MCG/DL (ref 249–505)
TRANSFERRIN SERPL-MCNC: 319 MG/DL (ref 200–360)
WBC NRBC COR # BLD: 6.1 10*3/MM3 (ref 4–10)

## 2018-11-14 PROCEDURE — 85025 COMPLETE CBC W/AUTO DIFF WBC: CPT | Performed by: INTERNAL MEDICINE

## 2018-11-14 PROCEDURE — 82728 ASSAY OF FERRITIN: CPT | Performed by: INTERNAL MEDICINE

## 2018-11-14 PROCEDURE — 36415 COLL VENOUS BLD VENIPUNCTURE: CPT | Performed by: INTERNAL MEDICINE

## 2018-11-14 PROCEDURE — 84466 ASSAY OF TRANSFERRIN: CPT | Performed by: INTERNAL MEDICINE

## 2018-11-14 PROCEDURE — 85046 RETICYTE/HGB CONCENTRATE: CPT | Performed by: INTERNAL MEDICINE

## 2018-11-14 PROCEDURE — 83540 ASSAY OF IRON: CPT | Performed by: INTERNAL MEDICINE

## 2018-11-14 PROCEDURE — 99214 OFFICE O/P EST MOD 30 MIN: CPT | Performed by: INTERNAL MEDICINE

## 2018-11-14 NOTE — TELEPHONE ENCOUNTER
----- Message from Jeremi Combs MD sent at 11/14/2018  2:30 PM EST -----  Please call the patient regarding her abnormal result.  Let her know that iron levels are better but I want her to continue the iron once daily until she sees me next.  Thanks,  FELIPE      Attempted to call pt x2, no answer. LV for her to return our call.

## 2018-11-14 NOTE — PROGRESS NOTES
[facility] OUTPATIENT CLINIC FOLLOW UP VISIT    REASON FOR FOLLOW-UP:    1.  History of stage I a (pT1b, N0) hormone receptor positive HER-2 negative invasive mammary carcinoma of the lower inner quadrant of the left breast.  11 mm abnormality by imaging.  7 mm invasive carcinoma at biopsy.  She had a left mastectomy with TRAM flap reconstruction.  5 mm grade 2 tumor at resection.  DCIS measured 1.2 cm at resection.      2.  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  3.  Anastrozole 1 mg daily was initiated on 10/16/2013.  Discontinued in October 2018.  4.  History of fat necrosis above the left breast in November 2013.    HISTORY OF PRESENT ILLNESS:  Jennifer Amos is a 68 y.o. female who returns today for follow up of the above issue.      She is doing well.  She had upper and lower endoscopy that confirmed H. pylori gastritis.  She has not yet started treatment for this as the gastroenterology office is waiting for a prior authorization from insurance.  She has been taking iron twice daily which she tolerates well.    Her biggest complaint today is hearing loss.  She was told by her audiologist that this could be from the anastrozole.  This occurred within a year of starting the anastrozole but we really did not discuss it in the office that she never really brought it up.  Her audiologist just recently told her that the medication could be contributing to the hearing loss.  The hearing loss is disrupting her lifestyle at this point as she has significant difficulty hearing when there is a lot of background noise.    PAST MEDICAL, SURGICAL, FAMILY, AND SOCIAL HISTORIES WERE REVIEWED WITH THE PATIENT AND IN THE ELECTRONIC MEDICAL RECORD, AND WERE UPDATED IF INDICATED.    ALLERGIES:  Allergies   Allergen Reactions   • Hydrocodone Shortness Of Breath   • Oxycodone-Acetaminophen Shortness Of Breath   • Percocet [Oxycodone-Acetaminophen] Shortness Of Breath   • Sulfa Antibiotics   "    Stomach pain   • Penicillins Other (See Comments)     CHILDHOOD REACTION       MEDICATIONS:  The medication list has been reviewed with the patient by the medical assistant, and the list has been updated in the electronic medical record, which I reviewed.  Medication dosages and frequencies were confirmed to be accurate.    REVIEW OF SYSTEMS:  PAIN:  See Vital Signs below.  GENERAL:  No fevers, chills, night sweats, or unintended weight loss.    SKIN:  No rash or non-healing lesions  HEME/LYMPH:  No abnormal bleeding.  No palpable lymphadenopathy.  EYES:  No vision changes or diplopia.  ENT:  See history of present illness  RESPIRATORY:  No cough, shortness of breath, hemoptysis, or wheezing.  CARDIOVASCULAR:  No chest pain, palpitations, orthopnea, or dyspnea on exertion.  GASTROINTESTINAL:  No abdominal pain, nausea, vomiting, constipation, diarrhea, melena.  Occasional hematochezia.  GENITOURINARY:  Did not complain of urinary symptoms today.  MUSCULOSKELETAL:  No joint pain, swelling, or erythema.  NEUROLOGIC:  No dizziness, loss of consciousness, or seizures.  PSYCHIATRIC:  No depression, anxiety, or mood changes.    Vitals:    11/14/18 1313   BP: 130/80   Pulse: 85   Resp: 16   Temp: 98.5 °F (36.9 °C)   SpO2: 97%   Weight: 87.5 kg (192 lb 14.4 oz)   Height: 166 cm (65.35\")   PainSc: 0-No pain       PHYSICAL EXAMINATION:  GENERAL:  Well-developed well-nourished female; awake, alert and oriented, in no acute distress.  Otherwise not examined today.    DIAGNOSTIC DATA:  Results for orders placed or performed in visit on 11/14/18   Ferritin   Result Value Ref Range    Ferritin 35.60 ng/mL   Iron Profile   Result Value Ref Range    Iron 66 37 - 145 mcg/dL    Iron Saturation 15 14 - 48 %    Transferrin 319 200 - 360 mg/dL    TIBC 447 249 - 505 mcg/dL   Retic With IRF & RET-He   Result Value Ref Range    Immature Reticulocyte Fraction 9.0 3.0 - 15.8 %    Reticulocyte % 1.28 0.60 - 2.00 %    Reticulocyte Hgb 32.7 " 29.8 - 36.1 pg   CBC Auto Differential   Result Value Ref Range    WBC 6.10 4.00 - 10.00 10*3/mm3    RBC 5.21 (H) 3.90 - 5.00 10*6/mm3    Hemoglobin 14.1 11.5 - 14.9 g/dL    Hematocrit 44.3 34.0 - 45.0 %    MCV 85.0 83.0 - 97.0 fL    MCH 27.1 27.0 - 33.0 pg    MCHC 31.8 (L) 32.0 - 35.0 g/dL    RDW 20.0 (H) 11.7 - 14.5 %    RDW-SD 60.3 (H) 37.0 - 49.0 fl    MPV 11.6 8.9 - 12.1 fL    Platelets 217 150 - 375 10*3/mm3    Neutrophil % 56.1 39.0 - 75.0 %    Lymphocyte % 33.9 20.0 - 49.0 %    Monocyte % 8.2 4.0 - 12.0 %    Eosinophil % 0.8 (L) 1.0 - 5.0 %    Basophil % 0.7 0.0 - 1.1 %    Immature Grans % 0.3 0.0 - 0.5 %    Neutrophils, Absolute 3.42 1.50 - 7.00 10*3/mm3    Lymphocytes, Absolute 2.07 1.00 - 3.50 10*3/mm3    Monocytes, Absolute 0.50 0.25 - 0.80 10*3/mm3    Eosinophils, Absolute 0.05 0.00 - 0.36 10*3/mm3    Basophils, Absolute 0.04 0.00 - 0.10 10*3/mm3    Immature Grans, Absolute 0.02 0.00 - 0.03 10*3/mm3    nRBC 0.0 0.0 - 0.0 /100 WBC     IMAGING:     CT imaging of the abdomen and pelvis from 4/5/2017 done for hematuria shows no evidence for kidney stone or renal mass.  No findings to explain hematuria.  Otherwise no acute process.    Mammogram on 6/20/2017:    IMPRESSIONS: Benign mammogram showing areas of fat necrosis within the  patient's left breast TRAM flap reconstruction.      BI-RADS category 2-benign findings.    Mammogram 6/21/2018:  IMPRESSION:  There are no findings suspicious for malignancy in the right  breast or left TRAM flap. Routine follow-up mammography is recommended.     BI-RADS Category 1: Negative.    ASSESSMENT:  This is a 68 y.o. female with:  1.  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  2.  History of stage I hormone receptor positive HER-2 negative invasive mammary carcinoma of the lower inner quadrant of the left breast.  She had a left mastectomy with TRAM flap reconstruction.  Anastrozole 1 mg daily was initiated October 2013.  She had a very low Oncotype DX  recurrence score.  5 years of therapy was complete in October 2018.  3.  History of fat necrosis of the left breast  4.  Osteopenia:  She gets annual Reclast which was prescribed by Dr. Arriaza, now her new PCP Dr. Padron.    5.  Fatigue: CMP and TSH were normal  6.  Frequent urinary tract infections: She saw Dr. Baires with urogynecology.    7.  Iron deficiency anemia: She saw Dr. Simpson with gastroenterology.  She had a couple of polyps removed from her colon.  She was found to have H. pylori gastritis.  Treatment has not yet been initiated due to issues with insurance according to the patient.  Nonetheless, her hemoglobin has normalized.  Iron studies have improved.  I have advised her to continue the iron once daily at this point.  8.  Hearing loss: She states this occurred about 4 years ago shortly after starting the anastrozole.  Difficult to prove whether the medication cause this or not.  She never really mentioned that to me and only recently did her audiologist state that the hearing loss might have been from the anastrozole.  I will try to look at some data.  She is off of anastrozole at this point.    PLAN:   1.  No further anastrozole  2.  Continue the iron supplement once daily and I will see her back in 3 months for follow-up with a CBC, reticulocyte count, ferritin, and iron profile.  3.  Treatment of the H. pylori gastritis per Dr. Simpson.  I have communicated with her today to see if she can expedite starting the treatment.

## 2018-11-15 ENCOUNTER — TELEPHONE (OUTPATIENT)
Dept: GASTROENTEROLOGY | Facility: CLINIC | Age: 68
End: 2018-11-15

## 2018-11-15 ENCOUNTER — TELEPHONE (OUTPATIENT)
Dept: ONCOLOGY | Facility: HOSPITAL | Age: 68
End: 2018-11-15

## 2018-11-15 NOTE — TELEPHONE ENCOUNTER
Rosa Elena Trejo MA   [facility] Clinical 1 Pool 26 minutes ago (3:20 PM)      PA for Esomeprazole was denied-to be scanned into Media tab (Routing comment)     Check with Rosa Elena ADEN re: doxycycline pa - unsure of response.  Call to [facility] @ [phone] - closed.    Message to Dr Simpson.

## 2018-11-15 NOTE — TELEPHONE ENCOUNTER
----- Message from Briseyda Simpson MD sent at 11/14/2018  5:33 PM EST -----  I got a note from her hematologist that apparently she is having issues with getting her H. pylori meds filled.  I ordered them all individually.  Can we please check and see what the holdup is, thank you

## 2018-11-15 NOTE — TELEPHONE ENCOUNTER
Patient notified, v/u     ----- Message from Jeremi Combs MD sent at 11/14/2018  2:30 PM EST -----  Please call the patient regarding her abnormal result.  Let her know that iron levels are better but I want her to continue the iron once daily until she sees me next.  Thanks,  HECTOR

## 2018-11-16 RX ORDER — METRONIDAZOLE 250 MG/1
250 TABLET ORAL 4 TIMES DAILY
Qty: 56 TABLET | Refills: 0 | Status: SHIPPED | OUTPATIENT
Start: 2018-11-16 | End: 2019-10-30 | Stop reason: SDDI

## 2018-11-16 RX ORDER — BISMUTH SUBSALICYLATE 262 MG/1
524 TABLET, CHEWABLE ORAL
Qty: 112 TABLET | Refills: 0 | Status: SHIPPED | OUTPATIENT
Start: 2018-11-16 | End: 2019-10-30 | Stop reason: SDDI

## 2018-11-16 RX ORDER — PANTOPRAZOLE SODIUM 40 MG/1
40 TABLET, DELAYED RELEASE ORAL
Qty: 28 TABLET | Refills: 0 | Status: SHIPPED | OUTPATIENT
Start: 2018-11-16 | End: 2019-10-30 | Stop reason: SDDI

## 2018-11-16 RX ORDER — TETRACYCLINE HYDROCHLORIDE 500 MG/1
500 CAPSULE ORAL 4 TIMES DAILY
Qty: 56 CAPSULE | Refills: 0 | Status: SHIPPED | OUTPATIENT
Start: 2018-11-16 | End: 2019-10-30 | Stop reason: SDDI

## 2018-11-16 NOTE — TELEPHONE ENCOUNTER
Briseyda Simpson MD   to Me           11:48 AM   Can we try to get the tetracycline?  That usually ends up being too expensive as well, or the pylera pack??    Attempt call to [facility] system seems to be not functioning correctly.    Esribe completed for Flagyl 250 mg 1 tab po 4x/day, #56, R0.  Tetracycline 500 mg 1 tab po 4x/day, #56, R0.  Peptobismol chewable tab - 2 tabs 4x/day, #112, R0.  Pantoprazole 40 mg 1 tab po 2x/day, #28, R0.      Alert received re: centrum silver.     Call to pt.  Advise that above 4 meds have been ordered.  Pt states had picked up Flagyl and Pepto 11/8.  Advise if has these on hand, do not  new rx's of those meds.  Advise if any problems with obtaining meds, please contact office and ask for RN.  Advise to hold centrum silver while taking abx.  Verb understanding.    Pt expressing frustration with difficulty has had obtaining c/s appt in spite of bleeding, and difficulty with phone calls.  Message to Manager.    Update to Dr Simpson.

## 2018-12-06 ENCOUNTER — TELEPHONE (OUTPATIENT)
Dept: GASTROENTEROLOGY | Facility: CLINIC | Age: 68
End: 2018-12-06

## 2018-12-06 NOTE — TELEPHONE ENCOUNTER
Faxed request received from [facility] for pantoprazole 40 mg 1 tab po BID - #180.  See note of 11/15 - rx for H pylori.  #28 only.    Denial faxed to [phone] - confirmation received.

## 2018-12-17 ENCOUNTER — RESULTS ENCOUNTER (OUTPATIENT)
Dept: GASTROENTEROLOGY | Facility: CLINIC | Age: 68
End: 2018-12-17

## 2018-12-17 DIAGNOSIS — B96.81 HELICOBACTER PYLORI GASTRITIS: ICD-10-CM

## 2018-12-17 DIAGNOSIS — K29.70 HELICOBACTER PYLORI GASTRITIS: ICD-10-CM

## 2019-01-02 ENCOUNTER — TELEPHONE (OUTPATIENT)
Dept: GASTROENTEROLOGY | Facility: CLINIC | Age: 69
End: 2019-01-02

## 2019-01-02 NOTE — TELEPHONE ENCOUNTER
Call from pt.  Requesting reschedule lab appt - changed to 1/9 @ 9am.  Reminder NPO 1 hour prior to H pylori testing.  Verb understanding.

## 2019-01-10 LAB — UREA BREATH TEST QL: NEGATIVE

## 2019-01-21 ENCOUNTER — TELEPHONE (OUTPATIENT)
Dept: GASTROENTEROLOGY | Facility: CLINIC | Age: 69
End: 2019-01-21

## 2019-01-21 NOTE — TELEPHONE ENCOUNTER
----- Message from Briseyda Simpson MD sent at 1/11/2019  5:43 PM EST -----  H pylori breath test shows she cleared the infection

## 2019-01-21 NOTE — TELEPHONE ENCOUNTER
Called pt and advised per Dr Simpson that her h pylori breath test shows she has cleared the infection. Pt verb understanding.

## 2019-02-27 ENCOUNTER — TELEPHONE (OUTPATIENT)
Dept: ONCOLOGY | Facility: CLINIC | Age: 69
End: 2019-02-27

## 2019-02-27 ENCOUNTER — LAB (OUTPATIENT)
Dept: LAB | Facility: HOSPITAL | Age: 69
End: 2019-02-27

## 2019-02-27 ENCOUNTER — OFFICE VISIT (OUTPATIENT)
Dept: ONCOLOGY | Facility: CLINIC | Age: 69
End: 2019-02-27

## 2019-02-27 VITALS
TEMPERATURE: 98 F | RESPIRATION RATE: 16 BRPM | SYSTOLIC BLOOD PRESSURE: 122 MMHG | BODY MASS INDEX: 31.56 KG/M2 | HEIGHT: 65 IN | WEIGHT: 189.4 LBS | HEART RATE: 91 BPM | DIASTOLIC BLOOD PRESSURE: 80 MMHG | OXYGEN SATURATION: 97 %

## 2019-02-27 DIAGNOSIS — Z85.3 HISTORY OF BREAST CANCER: Primary | ICD-10-CM

## 2019-02-27 DIAGNOSIS — D50.9 IRON DEFICIENCY ANEMIA, UNSPECIFIED IRON DEFICIENCY ANEMIA TYPE: ICD-10-CM

## 2019-02-27 LAB
BASOPHILS # BLD AUTO: 0.05 10*3/MM3 (ref 0–0.2)
BASOPHILS NFR BLD AUTO: 0.8 % (ref 0–1.5)
DEPRECATED RDW RBC AUTO: 47.8 FL (ref 37–54)
EOSINOPHIL # BLD AUTO: 0.15 10*3/MM3 (ref 0–0.4)
EOSINOPHIL NFR BLD AUTO: 2.4 % (ref 0.3–6.2)
ERYTHROCYTE [DISTWIDTH] IN BLOOD BY AUTOMATED COUNT: 14.2 % (ref 12.3–15.4)
FERRITIN SERPL-MCNC: 64.9 NG/ML (ref 13–150)
HCT VFR BLD AUTO: 39.6 % (ref 34–46.6)
HGB BLD-MCNC: 13.1 G/DL (ref 12–15.9)
HGB RETIC QN AUTO: 35 PG (ref 29.8–36.1)
IMM GRANULOCYTES # BLD AUTO: 0.02 10*3/MM3 (ref 0–0.05)
IMM GRANULOCYTES NFR BLD AUTO: 0.3 % (ref 0–0.5)
IMM RETICS NFR: 9.7 % (ref 3–15.8)
IRON 24H UR-MRATE: 75 MCG/DL (ref 37–145)
IRON SATN MFR SERPL: 20 % (ref 14–48)
LYMPHOCYTES # BLD AUTO: 1.99 10*3/MM3 (ref 0.7–3.1)
LYMPHOCYTES NFR BLD AUTO: 31.9 % (ref 19.6–45.3)
MCH RBC QN AUTO: 30.3 PG (ref 26.6–33)
MCHC RBC AUTO-ENTMCNC: 33.1 G/DL (ref 31.5–35.7)
MCV RBC AUTO: 91.5 FL (ref 79–97)
MONOCYTES # BLD AUTO: 0.5 10*3/MM3 (ref 0.1–0.9)
MONOCYTES NFR BLD AUTO: 8 % (ref 5–12)
NEUTROPHILS # BLD AUTO: 3.53 10*3/MM3 (ref 1.4–7)
NEUTROPHILS NFR BLD AUTO: 56.6 % (ref 42.7–76)
NRBC BLD AUTO-RTO: 0 /100 WBC (ref 0–0)
PLATELET # BLD AUTO: 200 10*3/MM3 (ref 140–450)
PMV BLD AUTO: 11.6 FL (ref 6–12)
RBC # BLD AUTO: 4.33 10*6/MM3 (ref 3.77–5.28)
RETICS/RBC NFR AUTO: 1.6 % (ref 0.5–1.5)
TIBC SERPL-MCNC: 368 MCG/DL (ref 249–505)
TRANSFERRIN SERPL-MCNC: 263 MG/DL (ref 200–360)
WBC NRBC COR # BLD: 6.24 10*3/MM3 (ref 3.4–10.8)

## 2019-02-27 PROCEDURE — 85025 COMPLETE CBC W/AUTO DIFF WBC: CPT | Performed by: INTERNAL MEDICINE

## 2019-02-27 PROCEDURE — 36415 COLL VENOUS BLD VENIPUNCTURE: CPT | Performed by: INTERNAL MEDICINE

## 2019-02-27 PROCEDURE — 85046 RETICYTE/HGB CONCENTRATE: CPT | Performed by: INTERNAL MEDICINE

## 2019-02-27 PROCEDURE — 84466 ASSAY OF TRANSFERRIN: CPT | Performed by: INTERNAL MEDICINE

## 2019-02-27 PROCEDURE — 99214 OFFICE O/P EST MOD 30 MIN: CPT | Performed by: INTERNAL MEDICINE

## 2019-02-27 PROCEDURE — 83540 ASSAY OF IRON: CPT | Performed by: INTERNAL MEDICINE

## 2019-02-27 PROCEDURE — 82728 ASSAY OF FERRITIN: CPT | Performed by: INTERNAL MEDICINE

## 2019-02-27 RX ORDER — BUPROPION HYDROCHLORIDE 150 MG/1
150 TABLET ORAL DAILY
COMMUNITY
Start: 2019-01-09 | End: 2020-01-09

## 2019-02-27 RX ORDER — FERROUS SULFATE 325(65) MG
325 TABLET ORAL DAILY
COMMUNITY
End: 2019-10-30 | Stop reason: SDDI

## 2019-02-27 NOTE — PROGRESS NOTES
[facility] OUTPATIENT CLINIC FOLLOW UP VISIT    REASON FOR FOLLOW-UP:    1.  History of stage I a (pT1b, N0) hormone receptor positive HER-2 negative invasive mammary carcinoma of the lower inner quadrant of the left breast.  11 mm abnormality by imaging.  7 mm invasive carcinoma at biopsy.  She had a left mastectomy with TRAM flap reconstruction.  5 mm grade 2 tumor at resection.  DCIS measured 1.2 cm at resection.      2.  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  3.  Anastrozole 1 mg daily was initiated on 10/16/2013.  Discontinued in October 2018.  4.  History of fat necrosis above the left breast in November 2013.    HISTORY OF PRESENT ILLNESS:  Jennifer Amos is a 68 y.o. female who returns today for follow up of the above issue.      She is doing about the same.  Taking iron once daily and tolerating this well.  No bleeding.  Hearing loss persists and is no better.      PAST MEDICAL, SURGICAL, FAMILY, AND SOCIAL HISTORIES WERE REVIEWED WITH THE PATIENT AND IN THE ELECTRONIC MEDICAL RECORD, AND WERE UPDATED IF INDICATED.    ALLERGIES:  Allergies   Allergen Reactions   • Hydrocodone Shortness Of Breath   • Oxycodone-Acetaminophen Shortness Of Breath   • Percocet [Oxycodone-Acetaminophen] Shortness Of Breath   • Sulfa Antibiotics      Stomach pain   • Penicillins Other (See Comments)     CHILDHOOD REACTION       MEDICATIONS:  The medication list has been reviewed with the patient by the medical assistant, and the list has been updated in the electronic medical record, which I reviewed.  Medication dosages and frequencies were confirmed to be accurate.    REVIEW OF SYSTEMS:  PAIN:  See Vital Signs below.  GENERAL:  No fevers, chills, night sweats, or unintended weight loss.    SKIN:  No rash or non-healing lesions  HEME/LYMPH:  No abnormal bleeding.  No palpable lymphadenopathy.  EYES:  No vision changes or diplopia.  ENT:  Hearing loss  RESPIRATORY:  No cough, shortness of  "breath, hemoptysis, or wheezing.  CARDIOVASCULAR:  No chest pain, palpitations, orthopnea, or dyspnea on exertion.  GASTROINTESTINAL:  No abdominal pain, nausea, vomiting, constipation, diarrhea, melena.  Occasional hematochezia.  GENITOURINARY:  Did not complain of urinary symptoms today.  MUSCULOSKELETAL:  No joint pain, swelling, or erythema.  NEUROLOGIC:  No dizziness, loss of consciousness, or seizures.  PSYCHIATRIC:  No depression, anxiety, or mood changes.    Vitals:    02/27/19 1324   BP: 122/80   Pulse: 91   Resp: 16   Temp: 98 °F (36.7 °C)   TempSrc: Oral   SpO2: 97%   Weight: 85.9 kg (189 lb 6.4 oz)   Height: 165.5 cm (65.16\")   PainSc: 0-No pain  Comment: ANEMIA       PHYSICAL EXAMINATION:  GENERAL:  Well-developed well-nourished female; awake, alert and oriented, in no acute distress.  Otherwise not examined today.  Lymphatics: No cervical, supraclavicular, or axillary lymphadenopathy  Mouth: No ulcers or evidence for mucositis.  Tongue is normal in appearance and well papillated.  Chest: Lungs clear to auscultation bilaterally  Heart: Regular rate and rhythm without murmurs rubs or rubs  Extremities: No clubbing cyanosis or edema.    DIAGNOSTIC DATA:  Results for orders placed or performed in visit on 02/27/19   Retic With IRF & RET-He   Result Value Ref Range    Immature Reticulocyte Fraction 9.7 3.0 - 15.8 %    Reticulocyte % 1.60 (H) 0.50 - 1.50 %    Reticulocyte Hgb 35.0 29.8 - 36.1 pg   CBC Auto Differential   Result Value Ref Range    WBC 6.24 3.40 - 10.80 10*3/mm3    RBC 4.33 3.77 - 5.28 10*6/mm3    Hemoglobin 13.1 12.0 - 15.9 g/dL    Hematocrit 39.6 34.0 - 46.6 %    MCV 91.5 79.0 - 97.0 fL    MCH 30.3 26.6 - 33.0 pg    MCHC 33.1 31.5 - 35.7 g/dL    RDW 14.2 12.3 - 15.4 %    RDW-SD 47.8 37.0 - 54.0 fl    MPV 11.6 6.0 - 12.0 fL    Platelets 200 140 - 450 10*3/mm3    Neutrophil % 56.6 42.7 - 76.0 %    Lymphocyte % 31.9 19.6 - 45.3 %    Monocyte % 8.0 5.0 - 12.0 %    Eosinophil % 2.4 0.3 - 6.2 %    " Basophil % 0.8 0.0 - 1.5 %    Immature Grans % 0.3 0.0 - 0.5 %    Neutrophils, Absolute 3.53 1.40 - 7.00 10*3/mm3    Lymphocytes, Absolute 1.99 0.70 - 3.10 10*3/mm3    Monocytes, Absolute 0.50 0.10 - 0.90 10*3/mm3    Eosinophils, Absolute 0.15 0.00 - 0.40 10*3/mm3    Basophils, Absolute 0.05 0.00 - 0.20 10*3/mm3    Immature Grans, Absolute 0.02 0.00 - 0.05 10*3/mm3    nRBC 0.0 0.0 - 0.0 /100 WBC     IMAGING:     CT imaging of the abdomen and pelvis from 4/5/2017 done for hematuria shows no evidence for kidney stone or renal mass.  No findings to explain hematuria.  Otherwise no acute process.    Mammogram on 6/20/2017:    IMPRESSIONS: Benign mammogram showing areas of fat necrosis within the  patient's left breast TRAM flap reconstruction.      BI-RADS category 2-benign findings.    Mammogram 6/21/2018:  IMPRESSION:  There are no findings suspicious for malignancy in the right  breast or left TRAM flap. Routine follow-up mammography is recommended.     BI-RADS Category 1: Negative.    ASSESSMENT:  This is a 68 y.o. female with:  1.  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  2.  History of stage I hormone receptor positive HER-2 negative invasive mammary carcinoma of the lower inner quadrant of the left breast.  She had a left mastectomy with TRAM flap reconstruction.  Anastrozole 1 mg daily was initiated October 2013.  She had a very low Oncotype DX recurrence score.  5 years of therapy was complete in October 2018.  3.  History of fat necrosis of the left breast  4.  Osteopenia:  She gets annual Reclast which was prescribed by Dr. Arriaza, now her new PCP Dr. Padron.    5.  Fatigue: CMP and TSH were normal  6.  Frequent urinary tract infections: She saw Dr. Baires with urogynecology.    7.  Iron deficiency anemia: She saw Dr. Simpson with gastroenterology.  She had a couple of polyps removed from her colon.  She was found to have H. pylori gastritis.  Treatment initiated.  Nonetheless, her hemoglobin has  normalized.  Iron studies have improved.  She decreased her iron supplement once daily.  Iron studies pending today but I suspect they will be better and I have advised her to stop her iron supplement.  We will follow-up the iron studies with her by phone.  8.  Hearing loss: She states this occurred about 4 years ago shortly after starting the anastrozole.  Difficult to prove whether the medication cause this or not.  She never really mentioned that to me.  She is off of anastrozole at this point.  No improvement.      PLAN:   1.  I think at this point she can discontinue her iron supplement.  We will follow-up her iron labs and notify her by phone.  Otherwise, I will see her back in about 2 months for follow-up with a CBC and a breast exam at that time.

## 2019-02-27 NOTE — TELEPHONE ENCOUNTER
----- Message from Jeremi Combs MD sent at 2/27/2019  2:13 PM EST -----  Please call the patient regarding her result.  Please let her know that her iron studies have improved.  She can discontinue her iron supplement as we discussed.  Thanks, HECTOR

## 2019-02-27 NOTE — TELEPHONE ENCOUNTER
Called patient with results per Dr. Combs.   Iron levels are normal and she can discontinue her supplements.   Patient asked about hemaglobin and that was normal as well.

## 2019-04-29 ENCOUNTER — OFFICE VISIT (OUTPATIENT)
Dept: ONCOLOGY | Facility: CLINIC | Age: 69
End: 2019-04-29

## 2019-04-29 ENCOUNTER — LAB (OUTPATIENT)
Dept: LAB | Facility: HOSPITAL | Age: 69
End: 2019-04-29

## 2019-04-29 VITALS
RESPIRATION RATE: 16 BRPM | HEART RATE: 80 BPM | OXYGEN SATURATION: 96 % | DIASTOLIC BLOOD PRESSURE: 77 MMHG | SYSTOLIC BLOOD PRESSURE: 120 MMHG | WEIGHT: 190.5 LBS | HEIGHT: 65 IN | BODY MASS INDEX: 31.74 KG/M2 | TEMPERATURE: 98.1 F

## 2019-04-29 DIAGNOSIS — D50.8 OTHER IRON DEFICIENCY ANEMIA: Primary | ICD-10-CM

## 2019-04-29 DIAGNOSIS — D50.9 MICROCYTIC ANEMIA: ICD-10-CM

## 2019-04-29 LAB
BASOPHILS # BLD AUTO: 0.05 10*3/MM3 (ref 0–0.2)
BASOPHILS NFR BLD AUTO: 0.7 % (ref 0–1.5)
DEPRECATED RDW RBC AUTO: 44.6 FL (ref 37–54)
EOSINOPHIL # BLD AUTO: 0.14 10*3/MM3 (ref 0–0.4)
EOSINOPHIL NFR BLD AUTO: 2 % (ref 0.3–6.2)
ERYTHROCYTE [DISTWIDTH] IN BLOOD BY AUTOMATED COUNT: 13.6 % (ref 12.3–15.4)
HCT VFR BLD AUTO: 42.7 % (ref 34–46.6)
HGB BLD-MCNC: 14.2 G/DL (ref 12–15.9)
HGB RETIC QN AUTO: 34.7 PG (ref 29.8–36.1)
IMM GRANULOCYTES # BLD AUTO: 0.02 10*3/MM3 (ref 0–0.05)
IMM GRANULOCYTES NFR BLD AUTO: 0.3 % (ref 0–0.5)
IMM RETICS NFR: 11.4 % (ref 3–15.8)
LYMPHOCYTES # BLD AUTO: 2.17 10*3/MM3 (ref 0.7–3.1)
LYMPHOCYTES NFR BLD AUTO: 31.6 % (ref 19.6–45.3)
MCH RBC QN AUTO: 29.8 PG (ref 26.6–33)
MCHC RBC AUTO-ENTMCNC: 33.3 G/DL (ref 31.5–35.7)
MCV RBC AUTO: 89.7 FL (ref 79–97)
MONOCYTES # BLD AUTO: 0.52 10*3/MM3 (ref 0.1–0.9)
MONOCYTES NFR BLD AUTO: 7.6 % (ref 5–12)
NEUTROPHILS # BLD AUTO: 3.97 10*3/MM3 (ref 1.7–7)
NEUTROPHILS NFR BLD AUTO: 57.8 % (ref 42.7–76)
NRBC BLD AUTO-RTO: 0 /100 WBC (ref 0–0.2)
PLATELET # BLD AUTO: 192 10*3/MM3 (ref 140–450)
PMV BLD AUTO: 11.2 FL (ref 6–12)
RBC # BLD AUTO: 4.76 10*6/MM3 (ref 3.77–5.28)
RETICS/RBC NFR AUTO: 1.55 % (ref 0.7–1.9)
WBC NRBC COR # BLD: 6.87 10*3/MM3 (ref 3.4–10.8)

## 2019-04-29 PROCEDURE — 85025 COMPLETE CBC W/AUTO DIFF WBC: CPT | Performed by: INTERNAL MEDICINE

## 2019-04-29 PROCEDURE — 36415 COLL VENOUS BLD VENIPUNCTURE: CPT | Performed by: INTERNAL MEDICINE

## 2019-04-29 PROCEDURE — 99215 OFFICE O/P EST HI 40 MIN: CPT | Performed by: INTERNAL MEDICINE

## 2019-04-29 PROCEDURE — 85046 RETICYTE/HGB CONCENTRATE: CPT | Performed by: INTERNAL MEDICINE

## 2019-04-29 RX ORDER — ALPRAZOLAM 0.25 MG/1
TABLET ORAL
Refills: 0 | COMMUNITY
Start: 2019-03-21 | End: 2019-10-30 | Stop reason: SDDI

## 2019-04-29 RX ORDER — LEVOTHYROXINE SODIUM 88 UG/1
88 TABLET ORAL DAILY
Refills: 0 | COMMUNITY
Start: 2019-03-25 | End: 2020-07-06 | Stop reason: DRUGHIGH

## 2019-04-29 RX ORDER — NITROFURANTOIN 25; 75 MG/1; MG/1
CAPSULE ORAL
Refills: 0 | COMMUNITY
Start: 2019-03-21 | End: 2019-10-30 | Stop reason: SDDI

## 2019-04-29 NOTE — PROGRESS NOTES
[facility] OUTPATIENT CLINIC FOLLOW UP VISIT    REASON FOR FOLLOW-UP:    1.  History of stage I a (pT1b, N0) hormone receptor positive HER-2 negative invasive mammary carcinoma of the lower inner quadrant of the left breast.  11 mm abnormality by imaging.  7 mm invasive carcinoma at biopsy.  She had a left mastectomy with TRAM flap reconstruction.  5 mm grade 2 tumor at resection.  DCIS measured 1.2 cm at resection.      2.  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  3.  Anastrozole 1 mg daily was initiated on 10/16/2013.  Discontinued in October 2018.  4.  History of fat necrosis above the left breast in November 2013.    HISTORY OF PRESENT ILLNESS:  Jennifer Amos is a 68 y.o. female who returns today for follow up of the above issue.      She has been off of her iron.  She remains depressed and anxious and her activity level has decreased since her  has been ill with myelodysplasia.  She denies any new problems with her breasts.  She denies any bleeding.    PAST MEDICAL, SURGICAL, FAMILY, AND SOCIAL HISTORIES WERE REVIEWED WITH THE PATIENT AND IN THE ELECTRONIC MEDICAL RECORD, AND WERE UPDATED IF INDICATED.    ALLERGIES:  Allergies   Allergen Reactions   • Hydrocodone Shortness Of Breath   • Oxycodone-Acetaminophen Shortness Of Breath   • Percocet [Oxycodone-Acetaminophen] Shortness Of Breath   • Sulfa Antibiotics      Stomach pain   • Penicillins Other (See Comments)     CHILDHOOD REACTION       MEDICATIONS:  The medication list has been reviewed with the patient by the medical assistant, and the list has been updated in the electronic medical record, which I reviewed.  Medication dosages and frequencies were confirmed to be accurate.    REVIEW OF SYSTEMS:  PAIN:  See Vital Signs below.  GENERAL:  No fevers, chills, night sweats, or unintended weight loss.    SKIN:  No rash or non-healing lesions  HEME/LYMPH:  No abnormal bleeding.  No palpable lymphadenopathy.  EYES:  " No vision changes or diplopia.  ENT:  Hearing loss  RESPIRATORY:  No cough, shortness of breath, hemoptysis, or wheezing.  CARDIOVASCULAR:  No chest pain, palpitations, orthopnea, or dyspnea on exertion.  GASTROINTESTINAL:  No abdominal pain, nausea, vomiting, constipation, diarrhea, melena.  GENITOURINARY:  Did not complain of urinary symptoms today.  MUSCULOSKELETAL:  No joint pain, swelling, or erythema.  NEUROLOGIC:  No dizziness, loss of consciousness, or seizures.  PSYCHIATRIC: Depression and anxiety    Vitals:    04/29/19 1326   BP: 120/77   Pulse: 80   Resp: 16   Temp: 98.1 °F (36.7 °C)   TempSrc: Oral   SpO2: 96%   Weight: 86.4 kg (190 lb 8 oz)   Height: 165.5 cm (65.16\")   PainSc: 0-No pain  Comment: breast cancer       PHYSICAL EXAMINATION:  GENERAL:  Well-developed well-nourished female; awake, alert and oriented, in no acute distress.    Lymphatics: No cervical, supraclavicular, or axillary lymphadenopathy  Mouth: No ulcers or evidence for mucositis.  Tongue is normal in appearance and well papillated.  Breast, both breasts were examined today.  Stable postoperative changes.  No new nodules.  Chest: Lungs clear to auscultation bilaterally  Heart: Regular rate and rhythm without murmurs rubs or rubs  Extremities: No clubbing cyanosis or edema.  Psych: Depressed mood and flat affect    DIAGNOSTIC DATA:  Results for orders placed or performed in visit on 04/29/19   Retic With IRF & RET-He   Result Value Ref Range    Immature Reticulocyte Fraction 11.4 3.0 - 15.8 %    Reticulocyte % 1.55 0.70 - 1.90 %    Reticulocyte Hgb 34.7 29.8 - 36.1 pg   CBC Auto Differential   Result Value Ref Range    WBC 6.87 3.40 - 10.80 10*3/mm3    RBC 4.76 3.77 - 5.28 10*6/mm3    Hemoglobin 14.2 12.0 - 15.9 g/dL    Hematocrit 42.7 34.0 - 46.6 %    MCV 89.7 79.0 - 97.0 fL    MCH 29.8 26.6 - 33.0 pg    MCHC 33.3 31.5 - 35.7 g/dL    RDW 13.6 12.3 - 15.4 %    RDW-SD 44.6 37.0 - 54.0 fl    MPV 11.2 6.0 - 12.0 fL    Platelets 192 140 " - 450 10*3/mm3    Neutrophil % 57.8 42.7 - 76.0 %    Lymphocyte % 31.6 19.6 - 45.3 %    Monocyte % 7.6 5.0 - 12.0 %    Eosinophil % 2.0 0.3 - 6.2 %    Basophil % 0.7 0.0 - 1.5 %    Immature Grans % 0.3 0.0 - 0.5 %    Neutrophils, Absolute 3.97 1.70 - 7.00 10*3/mm3    Lymphocytes, Absolute 2.17 0.70 - 3.10 10*3/mm3    Monocytes, Absolute 0.52 0.10 - 0.90 10*3/mm3    Eosinophils, Absolute 0.14 0.00 - 0.40 10*3/mm3    Basophils, Absolute 0.05 0.00 - 0.20 10*3/mm3    Immature Grans, Absolute 0.02 0.00 - 0.05 10*3/mm3    nRBC 0.0 0.0 - 0.2 /100 WBC     IMAGING:     CT imaging of the abdomen and pelvis from 4/5/2017 done for hematuria shows no evidence for kidney stone or renal mass.  No findings to explain hematuria.  Otherwise no acute process.    Mammogram on 6/20/2017:    IMPRESSIONS: Benign mammogram showing areas of fat necrosis within the  patient's left breast TRAM flap reconstruction.      BI-RADS category 2-benign findings.    Mammogram 6/21/2018:  IMPRESSION:  There are no findings suspicious for malignancy in the right  breast or left TRAM flap. Routine follow-up mammography is recommended.     BI-RADS Category 1: Negative.    ASSESSMENT:  This is a 68 y.o. female with:  1.  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  2.  History of stage I hormone receptor positive HER-2 negative invasive mammary carcinoma of the lower inner quadrant of the left breast.  She had a left mastectomy with TRAM flap reconstruction.  Anastrozole 1 mg daily was initiated October 2013.  She had a very low Oncotype DX recurrence score.  5 years of therapy was complete in October 2018.  3.  History of fat necrosis of the left breast  4.  Osteopenia:  She gets annual Reclast which was prescribed by Dr. Arriaza, now her new PCP Dr. Padron.    5.  Fatigue: CMP and TSH were normal  6.  Frequent urinary tract infections: She saw Dr. Baires with urogynecology.    7.  Iron deficiency anemia: She saw Dr. Simpson with gastroenterology.   She had a couple of polyps removed from her colon.  She was found to have H. pylori gastritis.  Treatment initiated.  Hemoglobin and iron studies improved.  She has been off of iron for a few months.  Everything looks good today.  8.  Hearing loss: She states this occurred about 4 years ago shortly after starting the anastrozole.  Difficult to prove whether the medication cause this or not.  She never really mentioned that to me.  She is off of anastrozole at this point.  No improvement.    9.  Depression: This was a new issue discussed today.  Much of this is due to her 's illness.  He is sick with myelodysplasia and receiving therapy.  He is fatigued and inactive and therefore she has been less active.  She is receiving some psychological care and is on a lot of medication for this.  I offered our services in our supportive oncology program for her and her  if she is interested.    PLAN:   1.  I will schedule formal follow-up appointment in 6 months with a CBC and reticulocyte count and a breast examination.  I will be seeing her along with her  during his visits prior to that.

## 2019-05-07 ENCOUNTER — TRANSCRIBE ORDERS (OUTPATIENT)
Dept: ADMINISTRATIVE | Facility: HOSPITAL | Age: 69
End: 2019-05-07

## 2019-05-07 DIAGNOSIS — Z12.31 VISIT FOR SCREENING MAMMOGRAM: Primary | ICD-10-CM

## 2019-06-21 ENCOUNTER — HOSPITAL ENCOUNTER (OUTPATIENT)
Dept: MAMMOGRAPHY | Facility: HOSPITAL | Age: 69
Discharge: HOME | End: 2019-06-21
Admitting: INTERNAL MEDICINE

## 2019-06-21 DIAGNOSIS — Z12.31 VISIT FOR SCREENING MAMMOGRAM: ICD-10-CM

## 2019-06-21 PROCEDURE — 77063 BREAST TOMOSYNTHESIS BI: CPT

## 2019-06-21 PROCEDURE — 77067 SCR MAMMO BI INCL CAD: CPT

## 2019-06-25 ENCOUNTER — TELEPHONE (OUTPATIENT)
Dept: ONCOLOGY | Facility: HOSPITAL | Age: 69
End: 2019-06-25

## 2019-06-25 NOTE — TELEPHONE ENCOUNTER
----- Message from Shady Bernal sent at 6/25/2019 10:00 AM EDT -----  Contact: [phone]  Result of jeniseo

## 2019-06-25 NOTE — TELEPHONE ENCOUNTER
Pt called for mammogram results. There is no report in Epic yet. Pt will try back towards the end of the week.

## 2019-06-27 ENCOUNTER — TELEPHONE (OUTPATIENT)
Dept: ONCOLOGY | Facility: HOSPITAL | Age: 69
End: 2019-06-27

## 2019-06-27 NOTE — TELEPHONE ENCOUNTER
----- Message from Mary Moreno sent at 6/27/2019  9:32 AM EDT -----  Contact: [phone]  Mammo results.  No answer left message to call back.

## 2019-10-30 ENCOUNTER — LAB (OUTPATIENT)
Dept: LAB | Facility: HOSPITAL | Age: 69
End: 2019-10-30

## 2019-10-30 ENCOUNTER — OFFICE VISIT (OUTPATIENT)
Dept: ONCOLOGY | Facility: CLINIC | Age: 69
End: 2019-10-30

## 2019-10-30 VITALS
HEIGHT: 65 IN | SYSTOLIC BLOOD PRESSURE: 135 MMHG | DIASTOLIC BLOOD PRESSURE: 76 MMHG | RESPIRATION RATE: 16 BRPM | OXYGEN SATURATION: 98 % | BODY MASS INDEX: 26.69 KG/M2 | TEMPERATURE: 97.9 F | WEIGHT: 160.2 LBS | HEART RATE: 77 BPM

## 2019-10-30 DIAGNOSIS — D50.8 OTHER IRON DEFICIENCY ANEMIA: ICD-10-CM

## 2019-10-30 DIAGNOSIS — Z85.3 HISTORY OF BREAST CANCER: Primary | ICD-10-CM

## 2019-10-30 LAB
BASOPHILS # BLD AUTO: 0.04 10*3/MM3 (ref 0–0.2)
BASOPHILS NFR BLD AUTO: 0.7 % (ref 0–1.5)
DEPRECATED RDW RBC AUTO: 46.5 FL (ref 37–54)
EOSINOPHIL # BLD AUTO: 0.1 10*3/MM3 (ref 0–0.4)
EOSINOPHIL NFR BLD AUTO: 1.7 % (ref 0.3–6.2)
ERYTHROCYTE [DISTWIDTH] IN BLOOD BY AUTOMATED COUNT: 13.9 % (ref 12.3–15.4)
HCT VFR BLD AUTO: 39.1 % (ref 34–46.6)
HGB BLD-MCNC: 13.4 G/DL (ref 12–15.9)
HGB RETIC QN AUTO: 34.6 PG (ref 29.8–36.1)
IMM GRANULOCYTES # BLD AUTO: 0.02 10*3/MM3 (ref 0–0.05)
IMM GRANULOCYTES NFR BLD AUTO: 0.3 % (ref 0–0.5)
IMM RETICS NFR: 11.2 % (ref 3–15.8)
LYMPHOCYTES # BLD AUTO: 2.03 10*3/MM3 (ref 0.7–3.1)
LYMPHOCYTES NFR BLD AUTO: 34.6 % (ref 19.6–45.3)
MCH RBC QN AUTO: 31.2 PG (ref 26.6–33)
MCHC RBC AUTO-ENTMCNC: 34.3 G/DL (ref 31.5–35.7)
MCV RBC AUTO: 90.9 FL (ref 79–97)
MONOCYTES # BLD AUTO: 0.46 10*3/MM3 (ref 0.1–0.9)
MONOCYTES NFR BLD AUTO: 7.8 % (ref 5–12)
NEUTROPHILS # BLD AUTO: 3.21 10*3/MM3 (ref 1.7–7)
NEUTROPHILS NFR BLD AUTO: 54.9 % (ref 42.7–76)
NRBC BLD AUTO-RTO: 0 /100 WBC (ref 0–0.2)
PLATELET # BLD AUTO: 192 10*3/MM3 (ref 140–450)
PMV BLD AUTO: 12.3 FL (ref 6–12)
RBC # BLD AUTO: 4.3 10*6/MM3 (ref 3.77–5.28)
RETICS/RBC NFR AUTO: 1.71 % (ref 0.7–1.9)
WBC NRBC COR # BLD: 5.86 10*3/MM3 (ref 3.4–10.8)

## 2019-10-30 PROCEDURE — 85025 COMPLETE CBC W/AUTO DIFF WBC: CPT

## 2019-10-30 PROCEDURE — 36415 COLL VENOUS BLD VENIPUNCTURE: CPT

## 2019-10-30 PROCEDURE — G0463 HOSPITAL OUTPT CLINIC VISIT: HCPCS | Performed by: INTERNAL MEDICINE

## 2019-10-30 PROCEDURE — 85046 RETICYTE/HGB CONCENTRATE: CPT

## 2019-10-30 PROCEDURE — 99215 OFFICE O/P EST HI 40 MIN: CPT | Performed by: INTERNAL MEDICINE

## 2019-10-30 RX ORDER — ZOSTER VACCINE RECOMBINANT, ADJUVANTED 50 MCG/0.5
KIT INTRAMUSCULAR
Refills: 0 | COMMUNITY
Start: 2019-09-27

## 2019-10-30 RX ORDER — INFLUENZA A VIRUS A/MICHIGAN/45/2015 X-275 (H1N1) ANTIGEN (FORMALDEHYDE INACTIVATED), INFLUENZA A VIRUS A/SINGAPORE/INFIMH-16-0019/2016 IVR-186 (H3N2) ANTIGEN (FORMALDEHYDE INACTIVATED), AND INFLUENZA B VIRUS B/MARYLAND/15/2016 BX-69A (A B/COLORADO/6/2017-LIKE VIRUS) ANTIGEN (FORMALDEHYDE INACTIVATED) 60; 60; 60 UG/.5ML; UG/.5ML; UG/.5ML
INJECTION, SUSPENSION INTRAMUSCULAR
Refills: 0 | COMMUNITY
Start: 2019-09-16

## 2019-10-30 NOTE — PROGRESS NOTES
[facility] OUTPATIENT CLINIC FOLLOW UP VISIT    REASON FOR FOLLOW-UP:    1.  History of stage I a (pT1b, N0) hormone receptor positive HER-2 negative invasive mammary carcinoma of the lower inner quadrant of the left breast.  11 mm abnormality by imaging.  7 mm invasive carcinoma at biopsy.  She had a left mastectomy with TRAM flap reconstruction.  5 mm grade 2 tumor at resection.  DCIS measured 1.2 cm at resection.      2.  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  3.  Anastrozole 1 mg daily was initiated on 10/16/2013.  Discontinued in October 2018.  4.  History of fat necrosis above the left breast in November 2013.    HISTORY OF PRESENT ILLNESS:  Jennifer Amos is a 69 y.o. female who returns today for follow up of the above issue.      She has not noticed any new problems with her breasts.  She is not on an iron supplement at this time.  No bleeding.    She continues to struggle with anxiety and depression regarding her 's medical condition.  He is being treated for myelodysplasia and is currently on a study at [facility].    PAST MEDICAL, SURGICAL, FAMILY, AND SOCIAL HISTORIES WERE REVIEWED WITH THE PATIENT AND IN THE ELECTRONIC MEDICAL RECORD, AND WERE UPDATED IF INDICATED.    ALLERGIES:  Allergies   Allergen Reactions   • Hydrocodone Shortness Of Breath   • Oxycodone-Acetaminophen Shortness Of Breath   • Percocet [Oxycodone-Acetaminophen] Shortness Of Breath   • Sulfa Antibiotics      Stomach pain   • Penicillins Other (See Comments)     CHILDHOOD REACTION       MEDICATIONS:  The medication list has been reviewed with the patient by the medical assistant, and the list has been updated in the electronic medical record, which I reviewed.  Medication dosages and frequencies were confirmed to be accurate.    REVIEW OF SYSTEMS:  PAIN:  See Vital Signs below.  GENERAL:  No fevers, chills, night sweats, or unintended weight loss.    SKIN:  No rash or non-healing  "lesions  HEME/LYMPH:  No abnormal bleeding.  No palpable lymphadenopathy.  EYES:  No vision changes or diplopia.  ENT:  Hearing loss  RESPIRATORY:  No cough, shortness of breath, hemoptysis, or wheezing.  CARDIOVASCULAR:  No chest pain, palpitations, orthopnea, or dyspnea on exertion.  GASTROINTESTINAL:  No abdominal pain, nausea, vomiting, constipation, diarrhea, melena.  GENITOURINARY:  Did not complain of urinary symptoms today.  MUSCULOSKELETAL:  No joint pain, swelling, or erythema.  NEUROLOGIC:  No dizziness, loss of consciousness, or seizures.  PSYCHIATRIC: Depression and anxiety    Vitals:    10/30/19 1132   BP: 135/76   Pulse: 77   Resp: 16   Temp: 97.9 °F (36.6 °C)   TempSrc: Oral   SpO2: 98%   Weight: 72.7 kg (160 lb 3.2 oz)   Height: 165.5 cm (65.16\")   PainSc: 0-No pain       PHYSICAL EXAMINATION:  GENERAL:  Well-developed well-nourished female; awake, alert and oriented, in no acute distress.    Lymphatics: No cervical, supraclavicular, or axillary lymphadenopathy  Mouth: No ulcers or evidence for mucositis.  Tongue is normal in appearance and well papillated.  Breast, both breasts were examined today.  Stable postoperative changes.  No new nodules.  No nipple discharge.    Chest: Lungs clear to auscultation bilaterally  Heart: Regular rate and rhythm without murmurs rubs or rubs  Extremities: No clubbing cyanosis or edema.  Psych: Depressed mood and flat affect    DIAGNOSTIC DATA:  Results for orders placed or performed in visit on 10/30/19   Retic With IRF & RET-He   Result Value Ref Range    Immature Reticulocyte Fraction 11.2 3.0 - 15.8 %    Reticulocyte % 1.71 0.70 - 1.90 %    Reticulocyte Hgb 34.6 29.8 - 36.1 pg   CBC Auto Differential   Result Value Ref Range    WBC 5.86 3.40 - 10.80 10*3/mm3    RBC 4.30 3.77 - 5.28 10*6/mm3    Hemoglobin 13.4 12.0 - 15.9 g/dL    Hematocrit 39.1 34.0 - 46.6 %    MCV 90.9 79.0 - 97.0 fL    MCH 31.2 26.6 - 33.0 pg    MCHC 34.3 31.5 - 35.7 g/dL    RDW 13.9 12.3 - " 15.4 %    RDW-SD 46.5 37.0 - 54.0 fl    MPV 12.3 (H) 6.0 - 12.0 fL    Platelets 192 140 - 450 10*3/mm3    Neutrophil % 54.9 42.7 - 76.0 %    Lymphocyte % 34.6 19.6 - 45.3 %    Monocyte % 7.8 5.0 - 12.0 %    Eosinophil % 1.7 0.3 - 6.2 %    Basophil % 0.7 0.0 - 1.5 %    Immature Grans % 0.3 0.0 - 0.5 %    Neutrophils, Absolute 3.21 1.70 - 7.00 10*3/mm3    Lymphocytes, Absolute 2.03 0.70 - 3.10 10*3/mm3    Monocytes, Absolute 0.46 0.10 - 0.90 10*3/mm3    Eosinophils, Absolute 0.10 0.00 - 0.40 10*3/mm3    Basophils, Absolute 0.04 0.00 - 0.20 10*3/mm3    Immature Grans, Absolute 0.02 0.00 - 0.05 10*3/mm3    nRBC 0.0 0.0 - 0.2 /100 WBC     IMAGING:     Mammogram 6/21/2019 BI-RADS Category 2.    ASSESSMENT:  This is a 69 y.o. female with:  1.  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  2.  History of stage I hormone receptor positive HER-2 negative invasive mammary carcinoma of the lower inner quadrant of the left breast.  She had a left mastectomy with TRAM flap reconstruction.  Anastrozole 1 mg daily was initiated October 2013.  She had a very low Oncotype DX recurrence score.  5 years of therapy was complete in October 2018.  3.  History of fat necrosis of the left breast  4.  Osteopenia:  She gets annual Reclast which was prescribed by Dr. Arriaza, now her new PCP Dr. Padron.    5.  Fatigue: CMP and TSH were normal  6.  Frequent urinary tract infections: She saw Dr. Baires with urogynecology.    7.  Iron deficiency anemia: She saw Dr. Simpson with gastroenterology.  She had a couple of polyps removed from her colon.  She was found to have H. pylori gastritis.  Treatment complete.  Hemoglobin has normalized.  8.  Hearing loss: She states this occurred about 4 years ago shortly after starting the anastrozole.  Difficult to prove whether the medication cause this or not.  She never really mentioned that to me.  She is off of anastrozole at this point.  No improvement.    9.  Depression and anxiety: Mostly related  to her 's medical condition with myelodysplasia currently on a study at [facility].  He is also my patient.  We had a long discussion about this today.  She is managed medically by other providers.  He is having worsening issues with mood irritability and depression.  Medication has been changed for him due to QT prolongation.  Primary care is managing his antidepressants at this point.  I did encourage her to talk to him about potential referral to our supportive oncology program as I think they can help as well.    PLAN:   1.  Follow-up in 6 months with a CBC and a breast examination.  I can certainly see her sooner if needed.      I spent 40 minutes in this visit today with all of that time spent in direct counseling with the patient regarding the above issues.

## 2020-02-24 ENCOUNTER — TELEPHONE (OUTPATIENT)
Dept: ONCOLOGY | Facility: HOSPITAL | Age: 70
End: 2020-02-24

## 2020-02-24 NOTE — TELEPHONE ENCOUNTER
----- Message from Jeremi Combs MD sent at 2/24/2020  2:21 PM EST -----  Common to have this intermittently in her situation as nerves regenerate.  Would advise observation for now but ask her to let us know if it persists.  Thanks, [facility]      ----- Message -----  From: Luisana Pereira RN  Sent: 2/24/2020   1:09 PM EST  To: Jeremi Combs MD    Pt is having a sharp needle piercing pain in her left brst. Its not constant and has been going on for 3-4 days. She denies any SOA. No other symptoms. She wants to know if she needs to see you or have a mammogram. Please respond to the clinical pool.

## 2020-04-29 ENCOUNTER — APPOINTMENT (OUTPATIENT)
Dept: LAB | Facility: HOSPITAL | Age: 70
End: 2020-04-29

## 2020-05-04 ENCOUNTER — TRANSCRIBE ORDERS (OUTPATIENT)
Dept: ADMINISTRATIVE | Facility: HOSPITAL | Age: 70
End: 2020-05-04

## 2020-05-04 ENCOUNTER — TELEPHONE (OUTPATIENT)
Dept: ONCOLOGY | Facility: CLINIC | Age: 70
End: 2020-05-04

## 2020-05-04 DIAGNOSIS — Z12.31 VISIT FOR SCREENING MAMMOGRAM: Primary | ICD-10-CM

## 2020-05-04 NOTE — TELEPHONE ENCOUNTER
----- Message from Jeremi Combs MD sent at 5/4/2020 11:48 AM EDT -----  I should be in the office that day.  Hopefully by that point we aren't working from home anymore, but I don't know.  Thanks!  [facility]    ----- Message -----  From: Nelly Godinez  Sent: 5/4/2020  11:33 AM EDT  To: Jeremi Combs MD, Mary Ramsey, RN    PT HAS BEEN RESCHEDULED TO 7/6/20 AS SHE DOES NOT WANT TO DO A VIDEO/TELEPHONE VISIT. PLEASE REVIEW AS I BELIEVE YOU ARE IN THE OFFICE DURING 7/6/20    THANK YOU   NELLY     ----- Message -----  From: Jeremi Combs MD  Sent: 5/4/2020  11:01 AM EDT  To: Mary Ramsey, [place], Nelly DYE Gretchen    When I reviewed my schedule for 5/7, I asked Nelly to convert it to a video visit or delay the visit by a couple of months if she would like.  We just haven't made it that far into the week yet.       Nelly, please follow up with her to see what she would like to do.      Thanks, [facility]     ----- Message -----  From: Mary Ramsey, RN  Sent: 5/4/2020  10:18 AM EDT  To: Jeremi Combs MD    See message from the hub and let us know what you think?    Patient has an appt on 05/07/20 and doesn't have the pain in her breast anymore. patient has scheduled a mammogram on 06/22. Patient wants to know if she still needs to come in for appt .        Patient phone # [phone]

## 2020-05-07 ENCOUNTER — APPOINTMENT (OUTPATIENT)
Dept: LAB | Facility: HOSPITAL | Age: 70
End: 2020-05-07

## 2020-06-22 ENCOUNTER — APPOINTMENT (OUTPATIENT)
Dept: MAMMOGRAPHY | Facility: HOSPITAL | Age: 70
End: 2020-06-22

## 2020-06-25 ENCOUNTER — HOSPITAL ENCOUNTER (OUTPATIENT)
Dept: MAMMOGRAPHY | Facility: HOSPITAL | Age: 70
Discharge: HOME | End: 2020-06-25
Admitting: INTERNAL MEDICINE

## 2020-06-25 DIAGNOSIS — Z12.31 VISIT FOR SCREENING MAMMOGRAM: ICD-10-CM

## 2020-06-25 PROCEDURE — 77067 SCR MAMMO BI INCL CAD: CPT

## 2020-06-25 PROCEDURE — 77063 BREAST TOMOSYNTHESIS BI: CPT

## 2020-06-26 ENCOUNTER — TELEPHONE (OUTPATIENT)
Dept: ONCOLOGY | Facility: CLINIC | Age: 70
End: 2020-06-26

## 2020-06-29 ENCOUNTER — TELEPHONE (OUTPATIENT)
Dept: ONCOLOGY | Facility: CLINIC | Age: 70
End: 2020-06-29

## 2020-06-29 NOTE — TELEPHONE ENCOUNTER
Spoke with patient letter her know there is no result of her mammogram as of yet.  Will keep an eye out and call when resulted.  Will call radiology tomorrow afternoon if no report is available by then, the patietn was happy with that plan.

## 2020-06-30 ENCOUNTER — TELEPHONE (OUTPATIENT)
Dept: ONCOLOGY | Facility: CLINIC | Age: 70
End: 2020-06-30

## 2020-06-30 NOTE — TELEPHONE ENCOUNTER
Called pt to ;et her know her mammogram showed no concerns for malignancy.  She will follow up in the office with Dr. Combs next week.

## 2020-07-06 ENCOUNTER — LAB (OUTPATIENT)
Dept: LAB | Facility: HOSPITAL | Age: 70
End: 2020-07-06

## 2020-07-06 ENCOUNTER — OFFICE VISIT (OUTPATIENT)
Dept: ONCOLOGY | Facility: CLINIC | Age: 70
End: 2020-07-06

## 2020-07-06 VITALS
BODY MASS INDEX: 30.6 KG/M2 | HEIGHT: 65 IN | HEART RATE: 78 BPM | DIASTOLIC BLOOD PRESSURE: 82 MMHG | OXYGEN SATURATION: 98 % | WEIGHT: 183.7 LBS | SYSTOLIC BLOOD PRESSURE: 139 MMHG | RESPIRATION RATE: 16 BRPM | TEMPERATURE: 97.5 F

## 2020-07-06 DIAGNOSIS — Z85.3 HISTORY OF BREAST CANCER: ICD-10-CM

## 2020-07-06 DIAGNOSIS — Z85.3 HISTORY OF BREAST CANCER: Primary | ICD-10-CM

## 2020-07-06 LAB
BASOPHILS # BLD AUTO: 0.05 10*3/MM3 (ref 0–0.2)
BASOPHILS NFR BLD AUTO: 0.8 % (ref 0–1.5)
DEPRECATED RDW RBC AUTO: 44.7 FL (ref 37–54)
EOSINOPHIL # BLD AUTO: 0.19 10*3/MM3 (ref 0–0.4)
EOSINOPHIL NFR BLD AUTO: 3 % (ref 0.3–6.2)
ERYTHROCYTE [DISTWIDTH] IN BLOOD BY AUTOMATED COUNT: 13.3 % (ref 12.3–15.4)
HCT VFR BLD AUTO: 40.7 % (ref 34–46.6)
HGB BLD-MCNC: 13.5 G/DL (ref 12–15.9)
HGB RETIC QN AUTO: 35.1 PG (ref 29.8–36.1)
IMM GRANULOCYTES # BLD AUTO: 0.05 10*3/MM3 (ref 0–0.05)
IMM GRANULOCYTES NFR BLD AUTO: 0.8 % (ref 0–0.5)
IMM RETICS NFR: 10.1 % (ref 3–15.8)
LYMPHOCYTES # BLD AUTO: 2.13 10*3/MM3 (ref 0.7–3.1)
LYMPHOCYTES NFR BLD AUTO: 33.4 % (ref 19.6–45.3)
MCH RBC QN AUTO: 30.4 PG (ref 26.6–33)
MCHC RBC AUTO-ENTMCNC: 33.2 G/DL (ref 31.5–35.7)
MCV RBC AUTO: 91.7 FL (ref 79–97)
MONOCYTES # BLD AUTO: 0.43 10*3/MM3 (ref 0.1–0.9)
MONOCYTES NFR BLD AUTO: 6.7 % (ref 5–12)
NEUTROPHILS NFR BLD AUTO: 3.53 10*3/MM3 (ref 1.7–7)
NEUTROPHILS NFR BLD AUTO: 55.3 % (ref 42.7–76)
NRBC BLD AUTO-RTO: 0 /100 WBC (ref 0–0.2)
PLATELET # BLD AUTO: 173 10*3/MM3 (ref 140–450)
PMV BLD AUTO: 12.1 FL (ref 6–12)
RBC # BLD AUTO: 4.44 10*6/MM3 (ref 3.77–5.28)
RETICS/RBC NFR AUTO: 1.56 % (ref 0.7–1.9)
WBC # BLD AUTO: 6.38 10*3/MM3 (ref 3.4–10.8)

## 2020-07-06 PROCEDURE — 85046 RETICYTE/HGB CONCENTRATE: CPT

## 2020-07-06 PROCEDURE — 85025 COMPLETE CBC W/AUTO DIFF WBC: CPT

## 2020-07-06 PROCEDURE — 36415 COLL VENOUS BLD VENIPUNCTURE: CPT

## 2020-07-06 PROCEDURE — 99215 OFFICE O/P EST HI 40 MIN: CPT | Performed by: INTERNAL MEDICINE

## 2020-07-06 RX ORDER — BUPROPION HYDROCHLORIDE 150 MG/1
TABLET ORAL
COMMUNITY
Start: 2020-05-08 | End: 2021-01-04 | Stop reason: DRUGHIGH

## 2021-01-03 NOTE — PROGRESS NOTES
[facility] OUTPATIENT CLINIC FOLLOW UP VISIT    REASON FOR FOLLOW-UP:    1.  History of stage I a (pT1b, N0) hormone receptor positive HER-2 negative invasive mammary carcinoma of the lower inner quadrant of the left breast.  11 mm abnormality by imaging.  7 mm invasive carcinoma at biopsy.  She had a left mastectomy with TRAM flap reconstruction.  5 mm grade 2 tumor at resection.  DCIS measured 1.2 cm at resection.      2.  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  3.  Anastrozole 1 mg daily was initiated on 10/16/2013.  Discontinued in October 2018.  4.  History of fat necrosis above the left breast in November 2013.    HISTORY OF PRESENT ILLNESS:  Jennifer Amos is a 70 y.o. female who returns today for follow up of the above issue.      She and her  recently returned from [facility] where he has been undergoing treatment for several months for AML.  She has been extremely stressed out because of the situation.  He has been difficult to care for due to his significant weakness and fatigue.    She is doing about as well as can be expected.  Medication is definitely helping with her ability to cope with his illness.  She reports occasional sharp pain in both breasts.  She has not noticed any new breast nodules.    She does have some urinary symptoms and was diagnosed with a urinary tract infection earlier today.    PAST MEDICAL, SURGICAL, FAMILY, AND SOCIAL HISTORIES WERE REVIEWED WITH THE PATIENT AND IN THE ELECTRONIC MEDICAL RECORD, AND WERE UPDATED IF INDICATED.    ALLERGIES:  Allergies   Allergen Reactions   • Hydrocodone Shortness Of Breath   • Oxycodone-Acetaminophen Shortness Of Breath   • Percocet [Oxycodone-Acetaminophen] Shortness Of Breath   • Sulfa Antibiotics      Stomach pain   • Penicillins Unknown - Low Severity     CHILDHOOD REACTION       MEDICATIONS:  The medication list has been reviewed with the patient by the medical assistant, and the list has  "been updated in the electronic medical record, which I reviewed.  Medication dosages and frequencies were confirmed to be accurate.    REVIEW OF SYSTEMS:  Occasional breast pain.  Depression and anxiety.    Vitals:    01/04/21 1334   BP: 133/80   Pulse: 83   Resp: 16   Temp: 97.5 °F (36.4 °C)   TempSrc: Temporal   SpO2: 97%   Weight: 89.6 kg (197 lb 8 oz)   Height: 165.5 cm (65.16\")   PainSc: 0-No pain       PHYSICAL EXAMINATION:  General:  No acute distress, awake, alert and oriented  Skin:  Warm and dry, no visible rash  HEENT:  normocephalic/atraumatic.  Wearing a face mask.  Chest:  Normal respiratory effort.  Lungs clear to auscultation bilaterally.  Heart: Regular rate and rhythm without murmurs gallops or rubs  Extremities:  No visible clubbing, cyanosis, or edema  Neuro/psych:  Grossly non-focal.  Normal mood and affect.  Breasts: Both breasts were examined today.  Left TRAM flap.  Right breast status post reduction.  No nodules in either breast.  No axillary adenopathy.      DIAGNOSTIC DATA:  Results for orders placed or performed in visit on 01/04/21   CBC Auto Differential    Specimen: Blood   Result Value Ref Range    WBC 8.27 3.40 - 10.80 10*3/mm3    RBC 4.53 3.77 - 5.28 10*6/mm3    Hemoglobin 13.7 12.0 - 15.9 g/dL    Hematocrit 40.3 34.0 - 46.6 %    MCV 89.0 79.0 - 97.0 fL    MCH 30.2 26.6 - 33.0 pg    MCHC 34.0 31.5 - 35.7 g/dL    RDW 13.4 12.3 - 15.4 %    RDW-SD 43.9 37.0 - 54.0 fl    MPV 11.6 6.0 - 12.0 fL    Platelets 173 140 - 450 10*3/mm3    Neutrophil % 63.8 42.7 - 76.0 %    Lymphocyte % 27.3 19.6 - 45.3 %    Monocyte % 6.4 5.0 - 12.0 %    Eosinophil % 1.2 0.3 - 6.2 %    Basophil % 0.6 0.0 - 1.5 %    Immature Grans % 0.7 (H) 0.0 - 0.5 %    Neutrophils, Absolute 5.27 1.70 - 7.00 10*3/mm3    Lymphocytes, Absolute 2.26 0.70 - 3.10 10*3/mm3    Monocytes, Absolute 0.53 0.10 - 0.90 10*3/mm3    Eosinophils, Absolute 0.10 0.00 - 0.40 10*3/mm3    Basophils, Absolute 0.05 0.00 - 0.20 10*3/mm3    Immature " Grans, Absolute 0.06 (H) 0.00 - 0.05 10*3/mm3    nRBC 0.0 0.0 - 0.2 /100 WBC     IMAGING:     Mammogram 6/25/2020 BI-RADS Category 2.    ASSESSMENT:  This is a 70 y.o. 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    *History of stage I hormone receptor positive HER-2 negative invasive mammary carcinoma of the lower inner quadrant of the left breast.    · She had a left mastectomy with TRAM flap reconstruction.    · Anastrozole 1 mg daily was initiated October 2013.    · She had a very low Oncotype DX recurrence score.    · 5 years of therapy was complete in October 2018.    *History of fat necrosis of the left breast    *Osteopenia:  She gets annual Reclast which was prescribed by Dr. Arriaza, now her new PCP Dr. Padron.      *Fatigue: CMP and TSH were normal    *Frequent urinary tract infections: She saw Dr. Baires with urogynecology with no explanation.  She does currently have a UTI.  She is going to be started on ciprofloxacin she states.    *Iron deficiency anemia: She saw Dr. Simpson with gastroenterology.  She had a couple of polyps removed from her colon.  She was found to have H. pylori gastritis.  Treatment complete.  Hemoglobin has normalized.  Hemoglobin remains normal.    *Hearing loss: She states this occurred shortly after starting the anastrozole.  Difficult to prove whether the medication cause this or not.  She never really mentioned that to me while she was on the anastrazole.  She is off of anastrozole at this point.  No improvement.  Hearing loss persists.    *Depression and anxiety: Mostly related to her 's medical condition with myelodysplasia currently on  therapy at [facility].  He is also my patient.  Better with medication and medication is the only way that she is coping with this.    *Pain in both breasts: Transient.  I think this is a neurologic issue related to her prior surgeries.    *Excess fatty tissue just below the left breast: I previously advised her  that this is a cosmetic issue that could be addressed surgically if she would like.  Not a concern for her at this point.    PLAN:   1. Follow-up in 6 months with a CBC and a breast examination.  I can certainly see her sooner if needed.   2. A mammogram will be due in June this year.  3. I will see her along with her  later this week.    I spent 30 minutes of time with this visit today both seeing the patient, preparing for the visit, and documenting the encounter.

## 2021-01-04 ENCOUNTER — LAB (OUTPATIENT)
Dept: LAB | Facility: HOSPITAL | Age: 71
End: 2021-01-04

## 2021-01-04 ENCOUNTER — OFFICE VISIT (OUTPATIENT)
Dept: ONCOLOGY | Facility: CLINIC | Age: 71
End: 2021-01-04

## 2021-01-04 VITALS
HEIGHT: 65 IN | DIASTOLIC BLOOD PRESSURE: 80 MMHG | BODY MASS INDEX: 32.9 KG/M2 | RESPIRATION RATE: 16 BRPM | SYSTOLIC BLOOD PRESSURE: 133 MMHG | WEIGHT: 197.5 LBS | TEMPERATURE: 97.5 F | OXYGEN SATURATION: 97 % | HEART RATE: 83 BPM

## 2021-01-04 DIAGNOSIS — Z85.3 HISTORY OF BREAST CANCER: ICD-10-CM

## 2021-01-04 DIAGNOSIS — Z85.3 HISTORY OF BREAST CANCER: Primary | ICD-10-CM

## 2021-01-04 LAB
BASOPHILS # BLD AUTO: 0.05 10*3/MM3 (ref 0–0.2)
BASOPHILS NFR BLD AUTO: 0.6 % (ref 0–1.5)
DEPRECATED RDW RBC AUTO: 43.9 FL (ref 37–54)
EOSINOPHIL # BLD AUTO: 0.1 10*3/MM3 (ref 0–0.4)
EOSINOPHIL NFR BLD AUTO: 1.2 % (ref 0.3–6.2)
ERYTHROCYTE [DISTWIDTH] IN BLOOD BY AUTOMATED COUNT: 13.4 % (ref 12.3–15.4)
HCT VFR BLD AUTO: 40.3 % (ref 34–46.6)
HGB BLD-MCNC: 13.7 G/DL (ref 12–15.9)
IMM GRANULOCYTES # BLD AUTO: 0.06 10*3/MM3 (ref 0–0.05)
IMM GRANULOCYTES NFR BLD AUTO: 0.7 % (ref 0–0.5)
LYMPHOCYTES # BLD AUTO: 2.26 10*3/MM3 (ref 0.7–3.1)
LYMPHOCYTES NFR BLD AUTO: 27.3 % (ref 19.6–45.3)
MCH RBC QN AUTO: 30.2 PG (ref 26.6–33)
MCHC RBC AUTO-ENTMCNC: 34 G/DL (ref 31.5–35.7)
MCV RBC AUTO: 89 FL (ref 79–97)
MONOCYTES # BLD AUTO: 0.53 10*3/MM3 (ref 0.1–0.9)
MONOCYTES NFR BLD AUTO: 6.4 % (ref 5–12)
NEUTROPHILS NFR BLD AUTO: 5.27 10*3/MM3 (ref 1.7–7)
NEUTROPHILS NFR BLD AUTO: 63.8 % (ref 42.7–76)
NRBC BLD AUTO-RTO: 0 /100 WBC (ref 0–0.2)
PLATELET # BLD AUTO: 173 10*3/MM3 (ref 140–450)
PMV BLD AUTO: 11.6 FL (ref 6–12)
RBC # BLD AUTO: 4.53 10*6/MM3 (ref 3.77–5.28)
WBC # BLD AUTO: 8.27 10*3/MM3 (ref 3.4–10.8)

## 2021-01-04 PROCEDURE — 36415 COLL VENOUS BLD VENIPUNCTURE: CPT

## 2021-01-04 PROCEDURE — 85025 COMPLETE CBC W/AUTO DIFF WBC: CPT

## 2021-01-04 PROCEDURE — 99214 OFFICE O/P EST MOD 30 MIN: CPT | Performed by: INTERNAL MEDICINE

## 2021-01-04 RX ORDER — CIPROFLOXACIN 250 MG/1
250 TABLET, FILM COATED ORAL
COMMUNITY
Start: 2021-01-04 | End: 2021-01-11

## 2021-01-04 RX ORDER — BUPROPION HYDROCHLORIDE 300 MG/1
TABLET ORAL
COMMUNITY
Start: 2020-12-28 | End: 2022-07-11 | Stop reason: SDUPTHER

## 2021-01-07 ENCOUNTER — E-VISIT (OUTPATIENT)
Dept: FAMILY MEDICINE CLINIC | Facility: TELEHEALTH | Age: 71
End: 2021-01-07

## 2021-03-01 NOTE — TELEPHONE ENCOUNTER
----- Message from Jeremi Combs MD sent at 5/4/2020 11:01 AM EDT -----  When I reviewed my schedule for 5/7, I asked Citlali to convert it to a video visit or delay the visit by a couple of months if she would like.  We just haven't made it that far into the week yet.       Citlali, please follow up with her to see what she would like to do.      Thanks, [facility]     ----- Message -----  From: Mary Ramsey RN  Sent: 5/4/2020  10:18 AM EDT  To: Jeremi Combs MD    See message from the hub and let us know what you think?    Patient has an appt on 05/07/20 and doesn't have the pain in her breast anymore. patient has scheduled a mammogram on 06/22. Patient wants to know if she still needs to come in for appt .        Patient phone # [phone]    
I HAVE CALLED THE PT TO COME IN ON 7/7/20 AS HE WANTS A FACE TO FACE VISIT. SHE DOES NOT WANT TO DO A VIDEO OR A TELEPHONE VISIT PT MAMMO IS BEING DONE ON 6/22/20   
Patient has an appt on 05/07/20 and doesn't have the pain in her breast anymore. patient has scheduled a mammogram on 06/22. Patient wants to know if she still needs to come in for appt .      Patient phone # [phone]  
LUÍS: notified by lab that K is 7.7, not hemolyzed.  EKG with mildly peaked t-waves, , otherwise hemodynamically stable.  SICU team in room actively packing patient for transport -- notified @ bedside about result and that patient will likely require nephro/dialysis consult tonight.

## 2021-03-09 DIAGNOSIS — Z23 IMMUNIZATION DUE: ICD-10-CM

## 2021-04-22 ENCOUNTER — TRANSCRIBE ORDERS (OUTPATIENT)
Dept: ADMINISTRATIVE | Facility: HOSPITAL | Age: 71
End: 2021-04-22

## 2021-04-22 DIAGNOSIS — Z12.31 ENCOUNTER FOR SCREENING MAMMOGRAM FOR BREAST CANCER: Primary | ICD-10-CM

## 2021-07-10 NOTE — PROGRESS NOTES
"[facility] GROUP OUTPATIENT CLINIC FOLLOW UP VISIT    REASON FOR FOLLOW-UP:    1.  History of stage I a (pT1b, N0) hormone receptor positive HER-2 negative invasive mammary carcinoma of the lower inner quadrant of the left breast.  11 mm abnormality by imaging.  7 mm invasive carcinoma at biopsy.  She had a left mastectomy with TRAM flap reconstruction.  5 mm grade 2 tumor at resection.  DCIS measured 1.2 cm at resection.      2.  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  3.  Anastrozole 1 mg daily was initiated on 10/16/2013.  Discontinued in October 2018.  4.  History of fat necrosis above the left breast in November 2013.    HISTORY OF PRESENT ILLNESS:  Jennifer Amos is a 71 y.o. female who returns today for follow up of the above issue.      She did lose her  Raf to acute myeloid leukemia a few months ago.    She states that she has handled this better than she thought she would.  She is depressed but remains active.  She has had a couple of other recent deaths in the family as well unfortunately.  She denies any new problems with her breasts.  Hearing loss persists.    REVIEW OF SYSTEMS:  Occasional breast pain is stable.  Some depression persists.    Vitals:    07/12/21 1309   BP: 134/82   Pulse: 81   Resp: 16   Temp: 97.5 °F (36.4 °C)   TempSrc: Temporal   SpO2: 96%   Weight: 85.7 kg (189 lb)   Height: 165.5 cm (65.16\")   PainSc: 0-No pain  Comment: breast cancer       PHYSICAL EXAMINATION:  General:  No acute distress, awake, alert and oriented  Skin:  Warm and dry, no visible rash  HEENT:  normocephalic/atraumatic.  Wearing a face mask.  Chest:  Normal respiratory effort.  Lungs clear to auscultation bilaterally.  Heart: Regular rate and rhythm without murmurs gallops or rubs  Extremities:  No visible clubbing, cyanosis, or edema  Neuro/psych:  Grossly non-focal.  Normal mood and affect.  Breasts: Both breasts were examined today.  Left TRAM flap.  Right breast " status post reduction.  No nodules present in either breast today and she did not complain of any tenderness.  No axillary adenopathy.      DIAGNOSTIC DATA:  CBC & Differential (07/12/2021 12:53)      IMAGING:     Mammogram 6/25/2020 BI-RADS Category 2.    ASSESSMENT:  This is a 71 y.o. 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    *History of stage I hormone receptor positive HER-2 negative invasive mammary carcinoma of the lower inner quadrant of the left breast.    · She had a left mastectomy with TRAM flap reconstruction.    · Anastrozole 1 mg daily was initiated October 2013.    · She had a very low Oncotype DX recurrence score.    · 5 years of therapy was complete in October 2018.  · She has mammogram scheduled on 7/19    *History of fat necrosis of the left breast    *Osteopenia:  She gets annual Reclast by primary care    *Fatigue: CMP and TSH were normal    *Frequent urinary tract infections: She saw Dr. Baires with urogynecology with no explanation.     *History of iron deficiency anemia:   · She saw Dr. Simpson with gastroenterology.  She had a couple of polyps removed from her colon.  She was found to have H. pylori gastritis.  Treatment complete.    · Hemoglobin remains normal today    *Hearing loss:   · She states this occurred shortly after starting the anastrozole.  Difficult to prove whether the medication cause this or not.    · She never really mentioned that to me while she was on the anastrazole.    · She is off of anastrozole at this point.  No improvement.    · Hearing loss persists.  · Stable    *Pain in both breasts: Transient.  I think this is a neurologic issue related to her prior surgeries.  No changes noted.      PLAN:   1. Follow-up in 6 months with a CBC and a breast examination.  I can certainly see her sooner if needed.   2. Mammogram scheduled in the next week    I did spend 25 minutes talking to her today, documenting the encounter, reviewing her record,  placing orders.

## 2021-07-12 ENCOUNTER — OFFICE VISIT (OUTPATIENT)
Dept: ONCOLOGY | Facility: CLINIC | Age: 71
End: 2021-07-12

## 2021-07-12 ENCOUNTER — LAB (OUTPATIENT)
Dept: LAB | Facility: HOSPITAL | Age: 71
End: 2021-07-12

## 2021-07-12 VITALS
HEIGHT: 65 IN | TEMPERATURE: 97.5 F | BODY MASS INDEX: 31.49 KG/M2 | WEIGHT: 189 LBS | RESPIRATION RATE: 16 BRPM | HEART RATE: 81 BPM | DIASTOLIC BLOOD PRESSURE: 82 MMHG | OXYGEN SATURATION: 96 % | SYSTOLIC BLOOD PRESSURE: 134 MMHG

## 2021-07-12 DIAGNOSIS — Z85.3 HISTORY OF BREAST CANCER: ICD-10-CM

## 2021-07-12 DIAGNOSIS — Z85.3 HISTORY OF BREAST CANCER: Primary | ICD-10-CM

## 2021-07-12 LAB
BASOPHILS # BLD AUTO: 0.05 10*3/MM3 (ref 0–0.2)
BASOPHILS NFR BLD AUTO: 0.7 % (ref 0–1.5)
DEPRECATED RDW RBC AUTO: 45.6 FL (ref 37–54)
EOSINOPHIL # BLD AUTO: 0.15 10*3/MM3 (ref 0–0.4)
EOSINOPHIL NFR BLD AUTO: 2.2 % (ref 0.3–6.2)
ERYTHROCYTE [DISTWIDTH] IN BLOOD BY AUTOMATED COUNT: 13.9 % (ref 12.3–15.4)
HCT VFR BLD AUTO: 41.9 % (ref 34–46.6)
HGB BLD-MCNC: 13.8 G/DL (ref 12–15.9)
IMM GRANULOCYTES # BLD AUTO: 0.06 10*3/MM3 (ref 0–0.05)
IMM GRANULOCYTES NFR BLD AUTO: 0.9 % (ref 0–0.5)
LYMPHOCYTES # BLD AUTO: 1.97 10*3/MM3 (ref 0.7–3.1)
LYMPHOCYTES NFR BLD AUTO: 28.8 % (ref 19.6–45.3)
MCH RBC QN AUTO: 29.6 PG (ref 26.6–33)
MCHC RBC AUTO-ENTMCNC: 32.9 G/DL (ref 31.5–35.7)
MCV RBC AUTO: 89.7 FL (ref 79–97)
MONOCYTES # BLD AUTO: 0.4 10*3/MM3 (ref 0.1–0.9)
MONOCYTES NFR BLD AUTO: 5.9 % (ref 5–12)
NEUTROPHILS NFR BLD AUTO: 4.2 10*3/MM3 (ref 1.7–7)
NEUTROPHILS NFR BLD AUTO: 61.5 % (ref 42.7–76)
NRBC BLD AUTO-RTO: 0 /100 WBC (ref 0–0.2)
PLATELET # BLD AUTO: 196 10*3/MM3 (ref 140–450)
PMV BLD AUTO: 11.3 FL (ref 6–12)
RBC # BLD AUTO: 4.67 10*6/MM3 (ref 3.77–5.28)
WBC # BLD AUTO: 6.83 10*3/MM3 (ref 3.4–10.8)

## 2021-07-12 PROCEDURE — 85025 COMPLETE CBC W/AUTO DIFF WBC: CPT

## 2021-07-12 PROCEDURE — 36415 COLL VENOUS BLD VENIPUNCTURE: CPT

## 2021-07-12 PROCEDURE — 99214 OFFICE O/P EST MOD 30 MIN: CPT | Performed by: INTERNAL MEDICINE

## 2021-07-19 ENCOUNTER — HOSPITAL ENCOUNTER (OUTPATIENT)
Dept: MAMMOGRAPHY | Facility: HOSPITAL | Age: 71
Discharge: HOME | End: 2021-07-19
Admitting: INTERNAL MEDICINE

## 2021-07-19 DIAGNOSIS — Z12.31 ENCOUNTER FOR SCREENING MAMMOGRAM FOR BREAST CANCER: ICD-10-CM

## 2021-07-19 PROCEDURE — 77067 SCR MAMMO BI INCL CAD: CPT

## 2021-07-19 PROCEDURE — 77063 BREAST TOMOSYNTHESIS BI: CPT

## 2021-09-14 ENCOUNTER — TELEPHONE (OUTPATIENT)
Dept: GASTROENTEROLOGY | Facility: CLINIC | Age: 71
End: 2021-09-14

## 2021-09-14 NOTE — TELEPHONE ENCOUNTER
----- Message from Daryl Salinas Rep sent at 9/14/2021 10:51 AM EDT -----  Regarding: Diagnosis from Colonoscopy  Contact: [phone]  Pt wanting to know infection diagnosed with from Colonoscopy performed 11/05/2018.  Please contact to advise.  Thank You.

## 2021-09-14 NOTE — TELEPHONE ENCOUNTER
Call to pt.  Advise dx with H pylori at that time.  F/u testing after tx was negative (see notes of 1/21/19).

## 2021-10-26 ENCOUNTER — TELEPHONE (OUTPATIENT)
Dept: ONCOLOGY | Facility: CLINIC | Age: 71
End: 2021-10-26

## 2021-10-26 NOTE — TELEPHONE ENCOUNTER
Caller: MARSHA PAUL    Relationship: PATIENT    Best call back number: [phone]    Which medication are you concerned about: ESTROGEN CREAM    Who prescribed you this medication: PATIENTS GYNOCOLOGIST    What are your concerns: PATIENT WANTED TO CHECK WITH DR GARDUNO IF IT WAS OK TO START USING THIS CREAM.    PLEASE CALL TO DISCUSS.

## 2021-10-27 NOTE — TELEPHONE ENCOUNTER
D/W Dr. Combs regarding pt's estrogen cream. Per Dr. Combs, he does not typically recommend an estrogen based cream for pt's with a history of breast cancer. However, pt should weigh the benefits/risks for their individual situation, speak with their OBGYN and see if the benefits outweigh the risks. If there is an alternative, it should be utilized first. Informed pt of this and she v/u. She is going to try something else first and speak again with her GYN.

## 2022-01-02 NOTE — PROGRESS NOTES
"[facility] GROUP OUTPATIENT CLINIC FOLLOW UP VISIT    REASON FOR FOLLOW-UP:    1.  History of stage I a (pT1b, N0) hormone receptor positive HER-2 negative invasive mammary carcinoma of the lower inner quadrant of the left breast.  11 mm abnormality by imaging.  7 mm invasive carcinoma at biopsy.  She had a left mastectomy with TRAM flap reconstruction.  5 mm grade 2 tumor at resection.  DCIS measured 1.2 cm at resection.      2.  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  3.  Anastrozole 1 mg daily was initiated on 10/16/2013.  Discontinued in October 2018.  4.  History of fat necrosis above the left breast in November 2013.    HISTORY OF PRESENT ILLNESS:  Jennifer Amos is a 71 y.o. female who returns today for follow up of the above issue.      She denies any new problems with her breasts.  She remains depressed over the loss of her  Raf last February but thinks that she is doing a little bit better.  She continues antidepressants.  She does not feel like counseling helps very much so she has not pursued this.  She is upset about weight gain.    REVIEW OF SYSTEMS:  Depression.  Weight gain.  As per the HPI otherwise.    Vitals:    01/03/22 1353   BP: 130/82   Pulse: 82   Resp: 16   Temp: 97.1 °F (36.2 °C)   TempSrc: Temporal   SpO2: 96%   Weight: 93.1 kg (205 lb 4.8 oz)   Height: 165.5 cm (65.16\")   PainSc: 0-No pain  Comment: breast cancer       PHYSICAL EXAMINATION:  General:  No acute distress, awake, alert and oriented  Skin:  Warm and dry, no visible rash  HEENT:  normocephalic/atraumatic.  Wearing a face mask.  Hearing loss noted.  Chest:  Normal respiratory effort.  Lungs clear to auscultation bilaterally.  Heart: Regular rate and rhythm without murmurs gallops or rubs  Extremities:  No visible clubbing, cyanosis, or edema  Neuro/psych:  Grossly non-focal.  Normal mood and affect.  Breasts: Both breasts were examined again today.  Left TRAM flap.  Right breast status " post reduction.  No nodules present in either breast today and she did not complain of any tenderness.  No axillary adenopathy present today.      DIAGNOSTIC DATA:  CBC & Differential (01/03/2022 13:31)      IMAGING:   Mammo Screening Digital Tomosynthesis Bilateral With CAD (07/19/2021 14:36)      ASSESSMENT:  This is a 71 y.o. 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    *History of stage I hormone receptor positive HER-2 negative invasive mammary carcinoma of the lower inner quadrant of the left breast.    · She had a left mastectomy with TRAM flap reconstruction.    · Anastrozole 1 mg daily was initiated October 2013.    · She had a very low Oncotype DX recurrence score.    · 5 years of therapy was complete in October 2018.  · Mammogram 7/19/21 BI-RADS category 2    *History of fat necrosis of the left breast    *Osteopenia:  She gets annual Reclast by primary care    *Frequent urinary tract infections: She saw Dr. Baires with urogynecology with no explanation. Vaginal estrogen suggested but not pursued    *History of iron deficiency anemia:   · She saw Dr. Simpson with gastroenterology.  She had a couple of polyps removed from her colon.  She was found to have H. pylori gastritis.  Treatment complete.    · Hemoglobin continues to be normal    *Hearing loss:   · She states this occurred shortly after starting the anastrozole.  Difficult to prove whether the medication cause this or not.    · She never really mentioned that to me while she was on the anastrazole.    · She is off of anastrozole at this point.  No improvement.    · Hearing loss persists.  · Stable as of 1/3/2022    *Depression and weight gain: She continues medication.  She is not interested in counseling.  We discussed today that this is a normal response to her situation.  She is overall doing better.    PLAN:   1. I will see her back in 6 months with a CBC and a breast examination.  I can certainly see her sooner if  needed.   2. Mammogram due in July 2022

## 2022-01-03 ENCOUNTER — LAB (OUTPATIENT)
Dept: LAB | Facility: HOSPITAL | Age: 72
End: 2022-01-03

## 2022-01-03 ENCOUNTER — OFFICE VISIT (OUTPATIENT)
Dept: ONCOLOGY | Facility: CLINIC | Age: 72
End: 2022-01-03

## 2022-01-03 VITALS
OXYGEN SATURATION: 96 % | TEMPERATURE: 97.1 F | DIASTOLIC BLOOD PRESSURE: 82 MMHG | RESPIRATION RATE: 16 BRPM | HEIGHT: 65 IN | BODY MASS INDEX: 34.2 KG/M2 | WEIGHT: 205.3 LBS | SYSTOLIC BLOOD PRESSURE: 130 MMHG | HEART RATE: 82 BPM

## 2022-01-03 DIAGNOSIS — Z85.3 HISTORY OF BREAST CANCER: ICD-10-CM

## 2022-01-03 DIAGNOSIS — Z85.3 HISTORY OF BREAST CANCER: Primary | ICD-10-CM

## 2022-01-03 LAB
BASOPHILS # BLD AUTO: 0.07 10*3/MM3 (ref 0–0.2)
BASOPHILS NFR BLD AUTO: 0.8 % (ref 0–1.5)
DEPRECATED RDW RBC AUTO: 45.1 FL (ref 37–54)
EOSINOPHIL # BLD AUTO: 0.16 10*3/MM3 (ref 0–0.4)
EOSINOPHIL NFR BLD AUTO: 1.9 % (ref 0.3–6.2)
ERYTHROCYTE [DISTWIDTH] IN BLOOD BY AUTOMATED COUNT: 14.1 % (ref 12.3–15.4)
HCT VFR BLD AUTO: 43.2 % (ref 34–46.6)
HGB BLD-MCNC: 14.4 G/DL (ref 12–15.9)
IMM GRANULOCYTES # BLD AUTO: 0.05 10*3/MM3 (ref 0–0.05)
IMM GRANULOCYTES NFR BLD AUTO: 0.6 % (ref 0–0.5)
LYMPHOCYTES # BLD AUTO: 2.34 10*3/MM3 (ref 0.7–3.1)
LYMPHOCYTES NFR BLD AUTO: 27.6 % (ref 19.6–45.3)
MCH RBC QN AUTO: 29.4 PG (ref 26.6–33)
MCHC RBC AUTO-ENTMCNC: 33.3 G/DL (ref 31.5–35.7)
MCV RBC AUTO: 88.2 FL (ref 79–97)
MONOCYTES # BLD AUTO: 0.81 10*3/MM3 (ref 0.1–0.9)
MONOCYTES NFR BLD AUTO: 9.6 % (ref 5–12)
NEUTROPHILS NFR BLD AUTO: 5.05 10*3/MM3 (ref 1.7–7)
NEUTROPHILS NFR BLD AUTO: 59.5 % (ref 42.7–76)
NRBC BLD AUTO-RTO: 0 /100 WBC (ref 0–0.2)
PLATELET # BLD AUTO: 208 10*3/MM3 (ref 140–450)
PMV BLD AUTO: 11.3 FL (ref 6–12)
RBC # BLD AUTO: 4.9 10*6/MM3 (ref 3.77–5.28)
WBC NRBC COR # BLD: 8.48 10*3/MM3 (ref 3.4–10.8)

## 2022-01-03 PROCEDURE — 99213 OFFICE O/P EST LOW 20 MIN: CPT | Performed by: INTERNAL MEDICINE

## 2022-01-03 PROCEDURE — 85025 COMPLETE CBC W/AUTO DIFF WBC: CPT

## 2022-01-03 PROCEDURE — 36415 COLL VENOUS BLD VENIPUNCTURE: CPT

## 2022-01-03 RX ORDER — DICYCLOMINE HYDROCHLORIDE 10 MG/1
CAPSULE ORAL
COMMUNITY
Start: 2021-12-07 | End: 2022-07-11 | Stop reason: SDDI

## 2022-01-03 RX ORDER — CIPROFLOXACIN 250 MG/1
TABLET, FILM COATED ORAL
COMMUNITY
Start: 2021-10-08 | End: 2022-01-03

## 2022-01-03 RX ORDER — SODIUM, POTASSIUM,MAG SULFATES 17.5-3.13G
SOLUTION, RECONSTITUTED, ORAL ORAL
COMMUNITY
Start: 2021-10-20 | End: 2022-07-11 | Stop reason: SDDI

## 2022-01-03 RX ORDER — FAMOTIDINE 20 MG/1
20 TABLET, FILM COATED ORAL 2 TIMES DAILY
COMMUNITY
Start: 2021-12-31

## 2022-04-21 ENCOUNTER — TRANSCRIBE ORDERS (OUTPATIENT)
Dept: ADMINISTRATIVE | Facility: HOSPITAL | Age: 72
End: 2022-04-21

## 2022-04-21 DIAGNOSIS — Z12.31 VISIT FOR SCREENING MAMMOGRAM: Primary | ICD-10-CM

## 2022-05-17 ENCOUNTER — APPOINTMENT (OUTPATIENT)
Dept: CT IMAGING | Facility: HOSPITAL | Age: 72
End: 2022-05-17

## 2022-05-17 ENCOUNTER — HOSPITAL ENCOUNTER (OUTPATIENT)
Facility: HOSPITAL | Age: 72
Setting detail: OBSERVATION
Discharge: HOME | End: 2022-05-18
Attending: EMERGENCY MEDICINE | Admitting: EMERGENCY MEDICINE

## 2022-05-17 ENCOUNTER — APPOINTMENT (OUTPATIENT)
Dept: GENERAL RADIOLOGY | Facility: HOSPITAL | Age: 72
End: 2022-05-17

## 2022-05-17 DIAGNOSIS — R07.9 CHEST PAIN, UNSPECIFIED TYPE: Primary | ICD-10-CM

## 2022-05-17 DIAGNOSIS — Z85.3 HISTORY OF BREAST CANCER: ICD-10-CM

## 2022-05-17 LAB
ALBUMIN SERPL-MCNC: 4.1 G/DL (ref 3.5–5.2)
ALBUMIN/GLOB SERPL: 1.4 G/DL
ALP SERPL-CCNC: 72 U/L (ref 39–117)
ALT SERPL W P-5'-P-CCNC: 22 U/L (ref 1–33)
ANION GAP SERPL CALCULATED.3IONS-SCNC: 10 MMOL/L (ref 5–15)
AST SERPL-CCNC: 24 U/L (ref 1–32)
BASOPHILS # BLD AUTO: 0.05 10*3/MM3 (ref 0–0.2)
BASOPHILS NFR BLD AUTO: 0.8 % (ref 0–1.5)
BILIRUB SERPL-MCNC: 0.7 MG/DL (ref 0–1.2)
BILIRUB UR QL STRIP: NEGATIVE
BUN SERPL-MCNC: 20 MG/DL (ref 8–23)
BUN/CREAT SERPL: 23 (ref 7–25)
CALCIUM SPEC-SCNC: 9.6 MG/DL (ref 8.6–10.5)
CHLORIDE SERPL-SCNC: 103 MMOL/L (ref 98–107)
CHOLEST SERPL-MCNC: 148 MG/DL (ref 0–200)
CLARITY UR: CLEAR
CO2 SERPL-SCNC: 25 MMOL/L (ref 22–29)
COLOR UR: YELLOW
CREAT SERPL-MCNC: 0.87 MG/DL (ref 0.57–1)
DEPRECATED RDW RBC AUTO: 43.6 FL (ref 37–54)
EGFRCR SERPLBLD CKD-EPI 2021: 71.3 ML/MIN/1.73
EOSINOPHIL # BLD AUTO: 0.1 10*3/MM3 (ref 0–0.4)
EOSINOPHIL NFR BLD AUTO: 1.7 % (ref 0.3–6.2)
ERYTHROCYTE [DISTWIDTH] IN BLOOD BY AUTOMATED COUNT: 13.2 % (ref 12.3–15.4)
GLOBULIN UR ELPH-MCNC: 2.9 GM/DL
GLUCOSE SERPL-MCNC: 95 MG/DL (ref 65–99)
GLUCOSE UR STRIP-MCNC: NEGATIVE MG/DL
HCT VFR BLD AUTO: 40.3 % (ref 34–46.6)
HDLC SERPL-MCNC: 56 MG/DL (ref 40–60)
HGB BLD-MCNC: 13 G/DL (ref 12–15.9)
HGB UR QL STRIP.AUTO: NEGATIVE
IMM GRANULOCYTES # BLD AUTO: 0.02 10*3/MM3 (ref 0–0.05)
IMM GRANULOCYTES NFR BLD AUTO: 0.3 % (ref 0–0.5)
KETONES UR QL STRIP: NEGATIVE
LDLC SERPL CALC-MCNC: 79 MG/DL (ref 0–100)
LDLC/HDLC SERPL: 1.41 {RATIO}
LEUKOCYTE ESTERASE UR QL STRIP.AUTO: NEGATIVE
LYMPHOCYTES # BLD AUTO: 2.19 10*3/MM3 (ref 0.7–3.1)
LYMPHOCYTES NFR BLD AUTO: 36.4 % (ref 19.6–45.3)
MCH RBC QN AUTO: 28.8 PG (ref 26.6–33)
MCHC RBC AUTO-ENTMCNC: 32.3 G/DL (ref 31.5–35.7)
MCV RBC AUTO: 89.4 FL (ref 79–97)
MONOCYTES # BLD AUTO: 0.56 10*3/MM3 (ref 0.1–0.9)
MONOCYTES NFR BLD AUTO: 9.3 % (ref 5–12)
NEUTROPHILS NFR BLD AUTO: 3.1 10*3/MM3 (ref 1.7–7)
NEUTROPHILS NFR BLD AUTO: 51.5 % (ref 42.7–76)
NITRITE UR QL STRIP: NEGATIVE
NRBC BLD AUTO-RTO: 0 /100 WBC (ref 0–0.2)
NT-PROBNP SERPL-MCNC: 22.6 PG/ML (ref 0–900)
PH UR STRIP.AUTO: 6 [PH] (ref 5–8)
PLATELET # BLD AUTO: 192 10*3/MM3 (ref 140–450)
PMV BLD AUTO: 11.4 FL (ref 6–12)
POTASSIUM SERPL-SCNC: 4.3 MMOL/L (ref 3.5–5.2)
PROT SERPL-MCNC: 7 G/DL (ref 6–8.5)
PROT UR QL STRIP: NEGATIVE
QT INTERVAL: 366 MS
RBC # BLD AUTO: 4.51 10*6/MM3 (ref 3.77–5.28)
SARS-COV-2 ORF1AB RESP QL NAA+PROBE: NOT DETECTED
SODIUM SERPL-SCNC: 138 MMOL/L (ref 136–145)
SP GR UR STRIP: 1.01 (ref 1–1.03)
TRIGL SERPL-MCNC: 66 MG/DL (ref 0–150)
TROPONIN T SERPL-MCNC: <0.01 NG/ML (ref 0–0.03)
TROPONIN T SERPL-MCNC: <0.01 NG/ML (ref 0–0.03)
TSH SERPL DL<=0.05 MIU/L-ACNC: 3.63 UIU/ML (ref 0.27–4.2)
UROBILINOGEN UR QL STRIP: NORMAL
VLDLC SERPL-MCNC: 13 MG/DL (ref 5–40)
WBC NRBC COR # BLD: 6.02 10*3/MM3 (ref 3.4–10.8)

## 2022-05-17 PROCEDURE — 93010 ELECTROCARDIOGRAM REPORT: CPT | Performed by: INTERNAL MEDICINE

## 2022-05-17 PROCEDURE — 85025 COMPLETE CBC W/AUTO DIFF WBC: CPT | Performed by: EMERGENCY MEDICINE

## 2022-05-17 PROCEDURE — G0378 HOSPITAL OBSERVATION PER HR: HCPCS

## 2022-05-17 PROCEDURE — U0004 COV-19 TEST NON-CDC HGH THRU: HCPCS | Performed by: EMERGENCY MEDICINE

## 2022-05-17 PROCEDURE — 80061 LIPID PANEL: CPT | Performed by: EMERGENCY MEDICINE

## 2022-05-17 PROCEDURE — 71045 X-RAY EXAM CHEST 1 VIEW: CPT

## 2022-05-17 PROCEDURE — 96374 THER/PROPH/DIAG INJ IV PUSH: CPT

## 2022-05-17 PROCEDURE — 84443 ASSAY THYROID STIM HORMONE: CPT | Performed by: NURSE PRACTITIONER

## 2022-05-17 PROCEDURE — 25010000002 DIPHENHYDRAMINE PER 50 MG: Performed by: EMERGENCY MEDICINE

## 2022-05-17 PROCEDURE — 84484 ASSAY OF TROPONIN QUANT: CPT | Performed by: EMERGENCY MEDICINE

## 2022-05-17 PROCEDURE — 93005 ELECTROCARDIOGRAM TRACING: CPT

## 2022-05-17 PROCEDURE — 81003 URINALYSIS AUTO W/O SCOPE: CPT | Performed by: EMERGENCY MEDICINE

## 2022-05-17 PROCEDURE — 99284 EMERGENCY DEPT VISIT MOD MDM: CPT

## 2022-05-17 PROCEDURE — 25010000002 KETOROLAC TROMETHAMINE PER 15 MG: Performed by: NURSE PRACTITIONER

## 2022-05-17 PROCEDURE — 96375 TX/PRO/DX INJ NEW DRUG ADDON: CPT

## 2022-05-17 PROCEDURE — 71275 CT ANGIOGRAPHY CHEST: CPT

## 2022-05-17 PROCEDURE — 83880 ASSAY OF NATRIURETIC PEPTIDE: CPT | Performed by: EMERGENCY MEDICINE

## 2022-05-17 PROCEDURE — 0 IOPAMIDOL PER 1 ML: Performed by: EMERGENCY MEDICINE

## 2022-05-17 PROCEDURE — U0005 INFEC AGEN DETEC AMPLI PROBE: HCPCS | Performed by: EMERGENCY MEDICINE

## 2022-05-17 PROCEDURE — 80053 COMPREHEN METABOLIC PANEL: CPT | Performed by: EMERGENCY MEDICINE

## 2022-05-17 RX ORDER — PANTOPRAZOLE SODIUM 40 MG/1
40 TABLET, DELAYED RELEASE ORAL EVERY MORNING
Status: DISCONTINUED | OUTPATIENT
Start: 2022-05-18 | End: 2022-05-18 | Stop reason: HOSPADM

## 2022-05-17 RX ORDER — ATORVASTATIN CALCIUM 10 MG/1
10 TABLET, FILM COATED ORAL NIGHTLY
Refills: 0 | Status: DISCONTINUED | OUTPATIENT
Start: 2022-05-17 | End: 2022-05-18 | Stop reason: HOSPADM

## 2022-05-17 RX ORDER — KETOROLAC TROMETHAMINE 15 MG/ML
15 INJECTION, SOLUTION INTRAMUSCULAR; INTRAVENOUS ONCE
Status: COMPLETED | OUTPATIENT
Start: 2022-05-17 | End: 2022-05-17

## 2022-05-17 RX ORDER — DIPHENHYDRAMINE HYDROCHLORIDE 50 MG/ML
25 INJECTION INTRAMUSCULAR; INTRAVENOUS ONCE
Status: COMPLETED | OUTPATIENT
Start: 2022-05-17 | End: 2022-05-17

## 2022-05-17 RX ORDER — SODIUM CHLORIDE 0.9 % (FLUSH) 0.9 %
10 SYRINGE (ML) INJECTION AS NEEDED
Status: DISCONTINUED | OUTPATIENT
Start: 2022-05-17 | End: 2022-05-18 | Stop reason: HOSPADM

## 2022-05-17 RX ORDER — LEVOTHYROXINE SODIUM 0.05 MG/1
100 TABLET ORAL DAILY
Status: DISCONTINUED | OUTPATIENT
Start: 2022-05-18 | End: 2022-05-18 | Stop reason: HOSPADM

## 2022-05-17 RX ORDER — SODIUM CHLORIDE 0.9 % (FLUSH) 0.9 %
10 SYRINGE (ML) INJECTION EVERY 12 HOURS SCHEDULED
Status: DISCONTINUED | OUTPATIENT
Start: 2022-05-17 | End: 2022-05-18 | Stop reason: HOSPADM

## 2022-05-17 RX ORDER — MELOXICAM 7.5 MG/1
7.5 TABLET ORAL ONCE
Status: COMPLETED | OUTPATIENT
Start: 2022-05-17 | End: 2022-05-18

## 2022-05-17 RX ADMIN — Medication 10 ML: at 20:34

## 2022-05-17 RX ADMIN — KETOROLAC TROMETHAMINE 15 MG: 15 INJECTION, SOLUTION INTRAMUSCULAR; INTRAVENOUS at 20:34

## 2022-05-17 RX ADMIN — IOPAMIDOL 95 ML: 755 INJECTION, SOLUTION INTRAVENOUS at 16:06

## 2022-05-17 RX ADMIN — DIPHENHYDRAMINE HYDROCHLORIDE 25 MG: 50 INJECTION, SOLUTION INTRAMUSCULAR; INTRAVENOUS at 16:18

## 2022-05-17 RX ADMIN — ATORVASTATIN CALCIUM 10 MG: 10 TABLET, FILM COATED ORAL at 20:34

## 2022-05-17 NOTE — ED NOTES
This RN wearing all appropriate PPE during patient encounter. Hand hygiene performed before and during entering room.

## 2022-05-17 NOTE — PLAN OF CARE
Goal Outcome Evaluation:  Plan of Care Reviewed With: patient   VSS; A&O X4; pt c/o pain of 3 out of 10 on the left breast; pt adlib; pt calm and cooperative; pt waiting for cardiology consult.      Progress: no change

## 2022-05-17 NOTE — CASE MANAGEMENT/SOCIAL WORK
Discharge Planning Assessment  [facility]     Patient Name: Jennifer Amos  MRN: [id number]  Today's Date: 5/17/2022    Admit Date: 5/17/2022     Discharge Needs Assessment     Row Name 05/17/22 1751       Living Environment    People in Home alone    Unique Family Situation Lives alone but has a son that provides support    Current Living Arrangements home    Primary Care Provided by self    Provides Primary Care For no one    Family Caregiver if Needed child(maxine), adult    Family Caregiver Names Javed Amos (son)    Quality of Family Relationships supportive    Able to Return to Prior Arrangements yes       Resource/Environmental Concerns    Resource/Environmental Concerns none    Transportation Concerns none       Transition Planning    Patient/Family Anticipates Transition to home    Patient/Family Anticipated Services at Transition none    Transportation Anticipated car, drives self       Discharge Needs Assessment    Readmission Within the Last 30 Days no previous admission in last 30 days    Equipment Currently Used at Home none    Concerns to be Addressed no discharge needs identified;denies needs/concerns at this time    Anticipated Changes Related to Illness none    Equipment Needed After Discharge none    Patient's Choice of Community Agency(s) Denies any needs at discharge at this time.               Discharge Plan     Row Name 05/17/22 1751       Plan    Plan Discharge home    Patient/Family in Agreement with Plan yes    Plan Comments I entered the patients room in proper PPE, introduced myself and my role.  The patient was accompanied by her son Javed and permission was given to speak with him in the room.  The patient currently lives alone but does has a son Javed that offers support if needed.  She states that she typically uses a mail-in pharmacy for her chronic prescriptions but for an acute medication she would like it sent to [facility] on [address] at [phone].  She denies  ever having used Home Health or Rehab.  She states that when she is discharged that she will drive herself home since her car is here.  She denies any needs at discharge at this time.  I did encourage her to request Case Management should her needs change.  She verbalized understanding and had no further questions/concerns at this time.              Continued Care and Services - Admitted Since 5/17/2022    Coordination has not been started for this encounter.          Demographic Summary    No documentation.                Functional Status    No documentation.                Psychosocial    No documentation.                Abuse/Neglect    No documentation.                Legal    No documentation.                Substance Abuse    No documentation.                Patient Forms    No documentation.                   Ceci Mast RN

## 2022-05-17 NOTE — ED PROVIDER NOTES
EMERGENCY DEPARTMENT ENCOUNTER    Room Number:  10/10  Date of encounter:  5/17/2022  PCP: Berenice Padron MD  Patient Care Team:  Berenice Padron MD as PCP - General (Internal Medicine)  Jeremi Combs MD as Consulting Physician (Hematology and Oncology)  Alpa Pineda MD as Referring Physician (Breast Surgery)  Briseyda Simpson MD as Consulting Physician (Gastroenterology)   Historian: Patient, family    HPI:  Chief Complaint: Chest pain  A complete HPI/ROS/PMH/PSH/SH/FH are unobtainable due to: Nothing    Context: Jennifer Amos is a 71 y.o. female who presents to the ED c/o left-sided chest pain constantly for the last 2 weeks.  She reports it radiates down her left arm.  It is not associated with shortness of breath, nausea or vomiting, or diaphoresis.  She denies any injury.  She has a history of breast cancer, hyperlipidemia, hypertension.  She is not a smoker.  She has seen her primary care doctor who is ordered a mammogram and an ultrasound of her left breast.  She has not yet had that scheduled.  She reports the pain is gotten progressively worse.  She states it is intolerable.  It is not worse with exertion.  She has never had a cardiac work-up.  She is followed by oncology.    Prior record review: Oncology note 1/3/2022 with frequent urinary tract infections, osteopenia, stage I hormone receptor positive HER2 negative invasive mammary carcinoma with mastectomy and TRAM flap reconstruction    PAST MEDICAL HISTORY  Active Ambulatory Problems     Diagnosis Date Noted   • OA (osteoarthritis) of knee 03/24/2016   • Senile osteoporosis 05/06/2016   • History of breast cancer 05/18/2016   • Neuritis of foot 07/13/2016   • Primary osteoarthritis of right foot 07/13/2016   • Paresthesia of right foot 07/13/2016   • Mucous cyst of toe 07/13/2016   • Osteopenia determined by x-ray 07/13/2016   • Bunionette 08/29/2016   • Urinary tract infection 10/21/2016   • Osteoporosis 05/02/2017   •  Osteopenia 05/11/2017   • Bunionette of right foot 08/16/2017   • Arthritis of foot 08/16/2017   • Closed nondisplaced fracture of proximal phalanx of lesser toe of left foot 08/16/2017   • Other fatigue 10/02/2017   • Microcytic anemia 09/12/2018   • Hematochezia 09/12/2018   • Iron deficiency anemia 10/23/2018     Resolved Ambulatory Problems     Diagnosis Date Noted   • No Resolved Ambulatory Problems     Past Medical History:   Diagnosis Date   • Acid reflux    • Arthritis    • Breast cancer (HCC)    • Depression    • Disease of thyroid gland    • Fat necrosis    • H/O Left calf vein thrombosis 10/2011   • History of anemia    • History of colon polyps    • History of fall    • History of osteopenia    • History of transfusion    • Hypercholesteremia    • Hypothyroidism    • Incisional hernia    • Joint pain    • Meniscus tear    • Sarcoidosis        The patient has a COVID HM Topic on their chart, and they are fully vaccinated.    PAST SURGICAL HISTORY  Past Surgical History:   Procedure Laterality Date   • BREAST BIOPSY     • BREAST SURGERY  2013    BILATERAL MASTECTOMY WITH LEFT BREAST AXILLARY LYMPH NODE DISSECTION   • COLONOSCOPY  2013   • COLONOSCOPY N/A 11/5/2018    Procedure: COLONOSCOPY to cecum  and TI:  biopsy polypectomies;  Surgeon: Briseyda Simpson MD;  Location: [facility] ENDOSCOPY;  Service: Gastroenterology   • ENDOSCOPY N/A 11/5/2018    Procedure: ESOPHAGOGASTRODUODENOSCOPY with biospsies and polypectomy;  Surgeon: Briseyda Simpson MD;  Location: [facility] ENDOSCOPY;  Service: Gastroenterology   • HERNIA REPAIR N/A 08/07/2015    ventral hernia repair by Dr. Brown   • HYSTERECTOMY  1990    WITH BILATERA SALPINGO OOPHERECTOMY   • KNEE ARTHROPLASTY Right 2011   • KNEE ARTHROPLASTY Left 03/25/2016    Partial replacement   • KNEE ARTHROPLASTY UNICOMPARTMENTAL Left 3/25/2016    Procedure: LT KNEE ARTHROPLASTY UNICOMPARTMENTAL;  Surgeon: Freddy Mast MD;  Location: [facility] MAIN OR;  Service:    •  MASTECTOMY     • ORIF FINGER FRACTURE Right 2014    HAND LITTLE FINGER   • RECONSTRUCTION BREAST W/ TRAM FLAP Bilateral 2014    Dr. Alex   • RHINOPLASTY     • TONSILLECTOMY  1953   • WISDOM TOOTH EXTRACTION  1960'S    FOUR         FAMILY HISTORY  Family History   Problem Relation Age of Onset   • Breast cancer Paternal Aunt 55   • Deep vein thrombosis Other    • Heart disease Other    • Hypertension Other    • Diabetes Other    • Cancer Other    • Kidney disease Other    • Glaucoma Other    • Heart failure Mother    • Hypertension Mother    • Breast cancer Maternal Aunt 65   • Breast cancer Cousin    • Stroke Father    • Alcohol abuse Father    • Thyroid disease Sister    • Depression Sister          SOCIAL HISTORY  Social History     Socioeconomic History   • Marital status:      Spouse name: Irving   • Number of children: 3   Tobacco Use   • Smoking status: Never Smoker   • Smokeless tobacco: Never Used   Substance and Sexual Activity   • Alcohol use: No   • Drug use: No   • Sexual activity: Defer         ALLERGIES  Hydrocodone, Oxycodone-acetaminophen, Percocet [oxycodone-acetaminophen], Sulfa antibiotics, and Penicillins        REVIEW OF SYSTEMS  Review of Systems   Positive chest pain, no shortness of breath, no nausea, no vomiting, no fever, no chills, positive night sweats, no diaphoresis, no abdominal pain  All systems reviewed and negative except for those discussed in HPI.       PHYSICAL EXAM    I have reviewed the triage vital signs and nursing notes.    ED Triage Vitals   Temp Heart Rate Resp BP SpO2   05/17/22 1334 05/17/22 1334 05/17/22 1334 05/17/22 1435 05/17/22 1334   98 °F (36.7 °C) 90 18 125/84 95 %      Temp src Heart Rate Source Patient Position BP Location FiO2 (%)   05/17/22 1334 05/17/22 1334 -- -- --   Tympanic Monitor          Physical Exam  GENERAL: Awake, alert, no acute distress  SKIN: Warm, dry  HENT: Normocephalic, atraumatic  EYES: no scleral icterus  CV: regular rhythm,  regular rate  RESPIRATORY: normal effort, lungs clear, left chest nontender to palpation  ABDOMEN: soft, nontender, nondistended  MUSCULOSKELETAL: no deformity  NEURO: alert, moves all extremities, follows commands          LAB RESULTS  Recent Results (from the past 24 hour(s))   ECG 12 Lead    Collection Time: 05/17/22  1:41 PM   Result Value Ref Range    QT Interval 366 ms   Comprehensive Metabolic Panel    Collection Time: 05/17/22  3:17 PM    Specimen: Blood   Result Value Ref Range    Glucose 95 65 - 99 mg/dL    BUN 20 8 - 23 mg/dL    Creatinine 0.87 0.57 - 1.00 mg/dL    Sodium 138 136 - 145 mmol/L    Potassium 4.3 3.5 - 5.2 mmol/L    Chloride 103 98 - 107 mmol/L    CO2 25.0 22.0 - 29.0 mmol/L    Calcium 9.6 8.6 - 10.5 mg/dL    Total Protein 7.0 6.0 - 8.5 g/dL    Albumin 4.10 3.50 - 5.20 g/dL    ALT (SGPT) 22 1 - 33 U/L    AST (SGOT) 24 1 - 32 U/L    Alkaline Phosphatase 72 39 - 117 U/L    Total Bilirubin 0.7 0.0 - 1.2 mg/dL    Globulin 2.9 gm/dL    A/G Ratio 1.4 g/dL    BUN/Creatinine Ratio 23.0 7.0 - 25.0    Anion Gap 10.0 5.0 - 15.0 mmol/L    eGFR 71.3 >60.0 mL/min/1.73   Troponin    Collection Time: 05/17/22  3:17 PM    Specimen: Blood   Result Value Ref Range    Troponin T <0.010 0.000 - 0.030 ng/mL   BNP    Collection Time: 05/17/22  3:17 PM    Specimen: Blood   Result Value Ref Range    proBNP 22.6 0.0 - 900.0 pg/mL   CBC Auto Differential    Collection Time: 05/17/22  3:17 PM    Specimen: Blood   Result Value Ref Range    WBC 6.02 3.40 - 10.80 10*3/mm3    RBC 4.51 3.77 - 5.28 10*6/mm3    Hemoglobin 13.0 12.0 - 15.9 g/dL    Hematocrit 40.3 34.0 - 46.6 %    MCV 89.4 79.0 - 97.0 fL    MCH 28.8 26.6 - 33.0 pg    MCHC 32.3 31.5 - 35.7 g/dL    RDW 13.2 12.3 - 15.4 %    RDW-SD 43.6 37.0 - 54.0 fl    MPV 11.4 6.0 - 12.0 fL    Platelets 192 140 - 450 10*3/mm3    Neutrophil % 51.5 42.7 - 76.0 %    Lymphocyte % 36.4 19.6 - 45.3 %    Monocyte % 9.3 5.0 - 12.0 %    Eosinophil % 1.7 0.3 - 6.2 %    Basophil % 0.8 0.0  - 1.5 %    Immature Grans % 0.3 0.0 - 0.5 %    Neutrophils, Absolute 3.10 1.70 - 7.00 10*3/mm3    Lymphocytes, Absolute 2.19 0.70 - 3.10 10*3/mm3    Monocytes, Absolute 0.56 0.10 - 0.90 10*3/mm3    Eosinophils, Absolute 0.10 0.00 - 0.40 10*3/mm3    Basophils, Absolute 0.05 0.00 - 0.20 10*3/mm3    Immature Grans, Absolute 0.02 0.00 - 0.05 10*3/mm3    nRBC 0.0 0.0 - 0.2 /100 WBC   Urinalysis With Microscopic If Indicated (No Culture) - Urine, Clean Catch    Collection Time: 05/17/22  3:33 PM    Specimen: Urine, Clean Catch   Result Value Ref Range    Color, UA Yellow Yellow, Straw    Appearance, UA Clear Clear    pH, UA 6.0 5.0 - 8.0    Specific Gravity, UA 1.015 1.005 - 1.030    Glucose, UA Negative Negative    Ketones, UA Negative Negative    Bilirubin, UA Negative Negative    Blood, UA Negative Negative    Protein, UA Negative Negative    Leuk Esterase, UA Negative Negative    Nitrite, UA Negative Negative    Urobilinogen, UA 0.2 E.U./dL 0.2 - 1.0 E.U./dL       Ordered the above labs and independently reviewed the results.        RADIOLOGY  XR Chest 1 View    Result Date: 5/17/2022  PORTABLE CHEST 05/17/2022 AT 3:30 PM  CLINICAL HISTORY: Chest pain  Compared to the previous chest dated 08/14/2013.  The heart lungs and mediastinal structures appear within normal limits. There are no infiltrates or effusions. There is no pneumothorax.  IMPRESSIONS: Normal chest x-ray.  This report was finalized on 5/17/2022 4:24 PM by Dr. Thomas Sanon M.D.      CT Angiogram Chest    Result Date: 5/17/2022  CT ANGIOGRAM OF THE CHEST. MULTIPLE CORONAL, SAGITTAL, AND 3-D RECONSTRUCTIONS.  HISTORY: 71-year-old female with persistent anterior left-sided chest pain for 2 weeks. Breast cancer history.  TECHNIQUE: Radiation dose reduction techniques were utilized, including automated exposure control and exposure modulation based on body size. CT angiogram of the chest was performed following the administration of IV contrast. Multiple  coronal, sagittal, and 3-D reconstruction images were obtained. Compared with chest CTA from 10/25/2011.  FINDINGS: There is adequate opacification of the pulmonary arteries and there is no convincing evidence for pulmonary thromboemboli. There is crowding of lung markings at the dependent aspects of the lower lobes. There is no convincing evidence for acute pneumonia and there are no pleural or pericardial effusions. There are a few pulmonary cysts which are stable and there is hyperlucency at the lingula which was present previously as well. There is no lymphadenopathy within the chest and there is no lymphadenopathy at the axilla or subpectoral regions. Left breast reconstruction changes following mastectomy are noted. No rib fractures or bone abnormality is seen.      1. There is no convincing evidence for pulmonary thromboemboli. No acute abnormality is seen. Please follow-up as clinically indicated. 2. The stable relative hyperlucency at the lingula is suspected to be secondary to air trapping at the lingula.  Discussed with Dr. Hong.        I ordered the above noted radiological studies. Reviewed by me and discussed with radiologist.  See dictation for official radiology interpretation.      PROCEDURES    Procedures      MEDICATIONS GIVEN IN ER    Medications   sodium chloride 0.9 % flush 10 mL (has no administration in time range)   iopamidol (ISOVUE-370) 76 % injection 95 mL (95 mL Intravenous Given by Other 5/17/22 1606)   diphenhydrAMINE (BENADRYL) injection 25 mg (25 mg Intravenous Given 5/17/22 1618)         PROGRESS, DATA ANALYSIS, CONSULTS, AND MEDICAL DECISION MAKING    All labs have been independently reviewed by me.  All radiology studies have been reviewed by me and discussed with radiologist dictating the report.   EKG's independently viewed and interpreted by me.  Discussion below represents my analysis of pertinent findings related to patient's condition, differential diagnosis, treatment  plan and final disposition.    Differential diagnosis includes but is not limited to metastatic breast cancer, pulmonary embolus, pneumothorax, non-STEMI, STEMI, unstable angina, acute aortic syndrome, pneumonia.    ED Course as of 05/17/22 1654   Tue May 17, 2022   1527 EKG          EKG time: 1341  Rhythm/Rate: Normal sinus, rate 82  P waves and OH: Normal P, normal OH  QRS, axis: Narrow QRS, normal axis  ST and T waves: Normal ST and T waves    Interpreted Contemporaneously by me, independently viewed  Not significantly changed compared to prior 3/9/2016   [TR]   1601 I reviewed the work-up and findings with the patient and family at the bedside.  So far her diagnostics are unrevealing.  She had a rash after the IV contrast which is resolved with Benadryl.  She states this is occurred with IV contrast similarly before.  Her son is concerned about cardiac disease although her history is very atypical for cardiac disease.  She has never had a work-up.  He personally knows Dr. Correa and Dr. Chambers.  We discussed at length regarding options going forward.  They elect to stay in the observation unit for cardiac work-up, pain control. [TR]   1620 Speaking with Dr. Garcia with radiology.  CT shows no acute findings. [TR]   1636 Heart Score Calculation:    History slightly suspicious: 0  History moderately suspicious: +1  History highly suspicious: +2    EKG normal: 0  EKG nonspecific repolarization disturbance: +1  EKG significant ST deviation: +2    Age less than 45: 0  Age 45-64: +1  Age greater than 65: +2    No known risk factors: 0  1-2 risk factors: +1  Greater than 3 risk factors: +2    Initial troponin less than the normal limit: 0  Initial troponin I-III times normal limit: +1  Initial troponin greater than 3 times normal limit: +2    Total score: 3 [TR]   1653 Speaking with Mirian Lewis with the obs unit.  She accepts. [TR]      ED Course User Index  [TR] Tay Hong MD           PPE: The patient wore a  mask and I wore an N95 mask throughout the entire patient encounter.       AS OF 16:54 EDT VITALS:    BP - 122/80  HR - 72  TEMP - 98 °F (36.7 °C) (Tympanic)  O2 SATS - 99%        DIAGNOSIS  Final diagnoses:   Chest pain, unspecified type   History of breast cancer         DISPOSITION  ED Disposition     ED Disposition   Decision to Admit    Condition   --    Comment   --                   Tay Hong MD  05/17/22 5467

## 2022-05-17 NOTE — H&P
.   [facility]   HISTORY AND PHYSICAL    Patient Name: Jennifer Amos  : 1950  MRN: [id number]  Primary Care Physician:  Berenice Padron MD  Date of admission: 2022    Subjective   Subjective     Chief Complaint: Chest pain    HPI:    Jennifer Amos is a 71 y.o. female with past medical history including but not limited to breast cancer, hyperlipidemia, GERD, hypothyroidism, osteoarthritis, osteopenia, and JOHN presents to [facility] with chest pain.  Patient reports left sided chest pain for the past 2 weeks.  Patient reports intermittent chest pain that she describes as dull ache that radiates into her left neck, shoulder, and down into the posterior aspect of her left upper arm.  Currently patient rates her pain 3 on a scale 0-10.  Nothing exacerbates or alleviates her symptoms.  Denies any associated nausea, vomiting, diaphoresis, dyspnea, or back pain.  Patient reports she has contacted her PCP and is scheduled for an ultrasound of left breast and mammogram sometimes in  but she is anxious and wants it done tomorrow.  Denies headache, dizziness, or visual disturbance.  Denies abdominal pain, nausea, vomiting, or diarrhea.  Denies hematemesis, hemoptysis, hematochezia, or melena.  Denies dysuria, hematuria, increased urinary frequency/urgency.    Review of Systems   All systems were reviewed and negative except for: Systems were reviewed and negative except for the mention above in HPI    Personal History     Past Medical History:   Diagnosis Date   • Acid reflux    • Arthritis    • Breast cancer (HCC)     Left, stage IA ([id number]) ER/[place] positive, HER2/negative, invasive mammary carcinoma    • Depression    • Disease of thyroid gland    • Fat necrosis     Superior to the left breast, benign per biopsy   • H/O Left calf vein thrombosis 10/2011   • History of anemia     Mild   • History of colon polyps    • History of fall     With left soulder and knee injuries   •  History of osteopenia    • History of transfusion    • Hypercholesteremia    • Hypothyroidism    • Incisional hernia     Of the abdomen status post repair by Dr. Zachary Brown Jr.   • Joint pain    • Meniscus tear     LEFT KNEE   • Sarcoidosis     DORMANT       Past Surgical History:   Procedure Laterality Date   • BREAST BIOPSY     • BREAST SURGERY  2013    BILATERAL MASTECTOMY WITH LEFT BREAST AXILLARY LYMPH NODE DISSECTION   • COLONOSCOPY  2013   • COLONOSCOPY N/A 11/5/2018    Procedure: COLONOSCOPY to cecum  and TI:  biopsy polypectomies;  Surgeon: Briseyda Simpson MD;  Location: [facility] ENDOSCOPY;  Service: Gastroenterology   • ENDOSCOPY N/A 11/5/2018    Procedure: ESOPHAGOGASTRODUODENOSCOPY with biospsies and polypectomy;  Surgeon: Briseyda Simpson MD;  Location: [facility] ENDOSCOPY;  Service: Gastroenterology   • HERNIA REPAIR N/A 08/07/2015    ventral hernia repair by Dr. Brown   • HYSTERECTOMY  1990    WITH BILATERA SALPINGO OOPHERECTOMY   • KNEE ARTHROPLASTY Right 2011   • KNEE ARTHROPLASTY Left 03/25/2016    Partial replacement   • KNEE ARTHROPLASTY UNICOMPARTMENTAL Left 3/25/2016    Procedure: LT KNEE ARTHROPLASTY UNICOMPARTMENTAL;  Surgeon: Freddy Mast MD;  Location: [facility] OR;  Service:    • MASTECTOMY     • ORIF FINGER FRACTURE Right 2014    HAND LITTLE FINGER   • RECONSTRUCTION BREAST W/ TRAM FLAP Bilateral 2014    Dr. Alex   • RHINOPLASTY     • TONSILLECTOMY  1953   • WISDOM TOOTH EXTRACTION  1960'S    FOUR       Family History: family history includes Alcohol abuse in her father; Breast cancer in her cousin; Breast cancer (age of onset: 55) in her paternal aunt; Breast cancer (age of onset: 65) in her maternal aunt; Cancer in an other family member; Deep vein thrombosis in an other family member; Depression in her sister; Diabetes in an other family member; Glaucoma in an other family member; Heart disease in an other family member; Heart failure in her mother; Hypertension in her mother and  another family member; Kidney disease in an other family member; Stroke in her father; Thyroid disease in her sister. Otherwise pertinent FHx was reviewed and not pertinent to current issue.    Social History:  reports that she has never smoked. She has never used smokeless tobacco. She reports that she does not drink alcohol and does not use drugs.    Home Medications:  Calcium Carb-Cholecalciferol, DULoxetine, EPINEPHrine, Vitamin D3, Zoster Vac Recomb Adjuvanted, buPROPion XL, dicyclomine, esomeprazole, famotidine, influenza vac split high-dose, levothyroxine, lovastatin, multivitamin with minerals, and sodium-potassium-magnesium sulfates    Allergies:  Allergies   Allergen Reactions   • Hydrocodone Shortness Of Breath   • Oxycodone-Acetaminophen Shortness Of Breath   • Percocet [Oxycodone-Acetaminophen] Shortness Of Breath   • Sulfa Antibiotics      Stomach pain   • Penicillins Unknown - Low Severity     CHILDHOOD REACTION       Objective   Objective     Vitals:   Temp:  [98 °F (36.7 °C)-98.1 °F (36.7 °C)] 98.1 °F (36.7 °C)  Heart Rate:  [71-90] 71  Resp:  [16-18] 16  BP: (109-133)/(71-84) 133/80  Physical Exam    Constitutional: Awake, alert, no acute distress   Eyes: PERRLA, sclerae anicteric, no conjunctival injection   HENT: NCAT, mucous membranes moist   Neck: Supple, no thyromegaly, no lymphadenopathy, trachea midline   Respiratory: Clear to auscultation bilaterally, nonlabored respirations no wheezing or stridor   Cardiovascular: RRR, no murmurs, rubs, or gallops, palpable pedal pulses bilaterally   Gastrointestinal: Positive bowel sounds, soft, nontender, nondistended   Musculoskeletal: No bilateral ankle edema, no clubbing or cyanosis to extremities   Psychiatric: Appropriate affect, cooperative   Neurologic: Oriented x 3, strength symmetric in all extremities, Cranial Nerves grossly intact to confrontation, speech clear   Skin: No rashes     Result Review    Result Review:  I have personally reviewed  the results from the time of this admission to 5/17/2022 19:25 EDT and agree with these findings:  [x]  Laboratory  []  Microbiology  []  Radiology  []  EKG/Telemetry   []  Cardiology/Vascular   []  Pathology  []  Old records  []  Other:  Most notable findings include:  Troponin<0.010, glucose 95, potassium 4.3, creatinine 27, WBC 6, hemoglobin 13, medic at 40, and platelets 192  Urinalysis negative for UTI    Assessment & Plan   Assessment / Plan     Brief Patient Summary:  Jennifer Amos is a 71 y.o. female who was seen and evaluated at the ED for left anterior chest pain.  Patient is being admitted to observation for further evaluation and cardiology consult.    Active Hospital Problems:  Active Hospital Problems    Diagnosis    • Chest pain      Plan:   Chest pain  -Initial and repeat troponin negative  -EKG nonischemic  -Repeat EKG and troponin in a.m.  -Telemetry monitoring  -CTA negative for any evidence of PE    Hyperlipidemia  -Lipid panel unremarkable, continue home medication    Hypothyroidism  -Continue home medication  -TSH pending    GERD/depression  -Continue home medication      DVT prophylaxis:  Mechanical DVT prophylaxis orders are present.    CODE STATUS:    Level Of Support Discussed With: Patient  Code Status (Patient has no pulse and is not breathing): CPR (Attempt to Resuscitate)  Medical Interventions (Patient has pulse or is breathing): Full Support    Admission Status:  I believe this patient meets observation status.    I wore an face mask, eye protection, and gloves during this patient encounter. Patient also wearing a surgical mask. Hand hygeine performed before and after seeing the patient.    Electronically signed by ALLIE Camp, 05/17/22, 7:25 PM EDT.

## 2022-05-18 ENCOUNTER — APPOINTMENT (OUTPATIENT)
Dept: NUCLEAR MEDICINE | Facility: HOSPITAL | Age: 72
End: 2022-05-18

## 2022-05-18 ENCOUNTER — TRANSCRIBE ORDERS (OUTPATIENT)
Dept: ADMINISTRATIVE | Facility: HOSPITAL | Age: 72
End: 2022-05-18

## 2022-05-18 VITALS
TEMPERATURE: 97.8 F | SYSTOLIC BLOOD PRESSURE: 128 MMHG | HEIGHT: 66 IN | BODY MASS INDEX: 29.57 KG/M2 | WEIGHT: 184 LBS | HEART RATE: 68 BPM | DIASTOLIC BLOOD PRESSURE: 72 MMHG | OXYGEN SATURATION: 98 % | RESPIRATION RATE: 18 BRPM

## 2022-05-18 DIAGNOSIS — Z85.3 HISTORY OF BREAST CANCER IN FEMALE: ICD-10-CM

## 2022-05-18 DIAGNOSIS — N64.4 BREAST PAIN: Primary | ICD-10-CM

## 2022-05-18 LAB
ANION GAP SERPL CALCULATED.3IONS-SCNC: 10.2 MMOL/L (ref 5–15)
BH CV REST NUCLEAR ISOTOPE DOSE: 12 MCI
BH CV STRESS BP STAGE 1: NORMAL
BH CV STRESS BP STAGE 2: NORMAL
BH CV STRESS DURATION MIN STAGE 1: 3
BH CV STRESS DURATION MIN STAGE 2: 2
BH CV STRESS DURATION SEC STAGE 1: 0
BH CV STRESS DURATION SEC STAGE 2: 32
BH CV STRESS GRADE STAGE 1: 10
BH CV STRESS GRADE STAGE 2: 12
BH CV STRESS HR STAGE 1: 113
BH CV STRESS HR STAGE 2: 136
BH CV STRESS METS STAGE 1: 5
BH CV STRESS METS STAGE 2: 7.5
BH CV STRESS NUCLEAR ISOTOPE DOSE: 36 MCI
BH CV STRESS PROTOCOL 1: NORMAL
BH CV STRESS RECOVERY BP: NORMAL MMHG
BH CV STRESS RECOVERY HR: 88 BPM
BH CV STRESS SPEED STAGE 1: 1.7
BH CV STRESS SPEED STAGE 2: 2.5
BH CV STRESS STAGE 1: 1
BH CV STRESS STAGE 2: 2
BUN SERPL-MCNC: 16 MG/DL (ref 8–23)
BUN/CREAT SERPL: 20.5 (ref 7–25)
CALCIUM SPEC-SCNC: 9 MG/DL (ref 8.6–10.5)
CHLORIDE SERPL-SCNC: 107 MMOL/L (ref 98–107)
CO2 SERPL-SCNC: 22.8 MMOL/L (ref 22–29)
CREAT SERPL-MCNC: 0.78 MG/DL (ref 0.57–1)
DEPRECATED RDW RBC AUTO: 43.6 FL (ref 37–54)
EGFRCR SERPLBLD CKD-EPI 2021: 81.3 ML/MIN/1.73
ERYTHROCYTE [DISTWIDTH] IN BLOOD BY AUTOMATED COUNT: 13.3 % (ref 12.3–15.4)
GLUCOSE SERPL-MCNC: 87 MG/DL (ref 65–99)
HCT VFR BLD AUTO: 37.9 % (ref 34–46.6)
HGB BLD-MCNC: 12.3 G/DL (ref 12–15.9)
LV EF NUC BP: 80 %
MAGNESIUM SERPL-MCNC: 2.3 MG/DL (ref 1.6–2.4)
MAXIMAL PREDICTED HEART RATE: 149 BPM
MCH RBC QN AUTO: 29.1 PG (ref 26.6–33)
MCHC RBC AUTO-ENTMCNC: 32.5 G/DL (ref 31.5–35.7)
MCV RBC AUTO: 89.8 FL (ref 79–97)
PERCENT MAX PREDICTED HR: 91.28 %
PLATELET # BLD AUTO: 163 10*3/MM3 (ref 140–450)
PMV BLD AUTO: 11.6 FL (ref 6–12)
POTASSIUM SERPL-SCNC: 3.9 MMOL/L (ref 3.5–5.2)
QT INTERVAL: 419 MS
RBC # BLD AUTO: 4.22 10*6/MM3 (ref 3.77–5.28)
SODIUM SERPL-SCNC: 140 MMOL/L (ref 136–145)
STRESS BASELINE BP: NORMAL MMHG
STRESS BASELINE HR: 82 BPM
STRESS PERCENT HR: 107 %
STRESS POST ESTIMATED WORKLOAD: 7.1 METS
STRESS POST EXERCISE DUR MIN: 5 MIN
STRESS POST EXERCISE DUR SEC: 33 SEC
STRESS POST PEAK BP: NORMAL MMHG
STRESS POST PEAK HR: 136 BPM
STRESS TARGET HR: 127 BPM
TROPONIN T SERPL-MCNC: <0.01 NG/ML (ref 0–0.03)
WBC NRBC COR # BLD: 6.21 10*3/MM3 (ref 3.4–10.8)

## 2022-05-18 PROCEDURE — 0 TECHNETIUM SESTAMIBI: Performed by: EMERGENCY MEDICINE

## 2022-05-18 PROCEDURE — 78452 HT MUSCLE IMAGE SPECT MULT: CPT

## 2022-05-18 PROCEDURE — 99203 OFFICE O/P NEW LOW 30 MIN: CPT | Performed by: INTERNAL MEDICINE

## 2022-05-18 PROCEDURE — 83735 ASSAY OF MAGNESIUM: CPT | Performed by: EMERGENCY MEDICINE

## 2022-05-18 PROCEDURE — 93005 ELECTROCARDIOGRAM TRACING: CPT | Performed by: NURSE PRACTITIONER

## 2022-05-18 PROCEDURE — 93016 CV STRESS TEST SUPVJ ONLY: CPT | Performed by: INTERNAL MEDICINE

## 2022-05-18 PROCEDURE — 80048 BASIC METABOLIC PNL TOTAL CA: CPT | Performed by: EMERGENCY MEDICINE

## 2022-05-18 PROCEDURE — G0378 HOSPITAL OBSERVATION PER HR: HCPCS

## 2022-05-18 PROCEDURE — 93018 CV STRESS TEST I&R ONLY: CPT | Performed by: INTERNAL MEDICINE

## 2022-05-18 PROCEDURE — 84484 ASSAY OF TROPONIN QUANT: CPT | Performed by: NURSE PRACTITIONER

## 2022-05-18 PROCEDURE — 93010 ELECTROCARDIOGRAM REPORT: CPT | Performed by: INTERNAL MEDICINE

## 2022-05-18 PROCEDURE — 78452 HT MUSCLE IMAGE SPECT MULT: CPT | Performed by: INTERNAL MEDICINE

## 2022-05-18 PROCEDURE — 85027 COMPLETE CBC AUTOMATED: CPT | Performed by: EMERGENCY MEDICINE

## 2022-05-18 PROCEDURE — 93017 CV STRESS TEST TRACING ONLY: CPT

## 2022-05-18 PROCEDURE — A9500 TC99M SESTAMIBI: HCPCS | Performed by: EMERGENCY MEDICINE

## 2022-05-18 RX ADMIN — PANTOPRAZOLE SODIUM 40 MG: 40 TABLET, DELAYED RELEASE ORAL at 08:10

## 2022-05-18 RX ADMIN — Medication 10 ML: at 08:14

## 2022-05-18 RX ADMIN — TECHNETIUM TC 99M SESTAMIBI 1 DOSE: 1 INJECTION INTRAVENOUS at 12:45

## 2022-05-18 RX ADMIN — LEVOTHYROXINE SODIUM 100 MCG: 0.05 TABLET ORAL at 09:27

## 2022-05-18 RX ADMIN — TECHNETIUM TC 99M SESTAMIBI 1 DOSE: 1 INJECTION INTRAVENOUS at 08:30

## 2022-05-18 RX ADMIN — MELOXICAM 7.5 MG: 7.5 TABLET ORAL at 00:25

## 2022-05-18 NOTE — CONSULTS
[facility] Consult Note       Encounter Date:22  Patient:Jennifer Amos  :1950  MRN:[id number]    Date of Admission: 2022  Date of Encounter Visit: 22  Encounter Provider: Higinio Parsons MD  Referring Provider: No ref. provider found  Place of Service: [facility]  Patient Care Team:  Berenice Padron MD as PCP - General (Internal Medicine)  Jeremi Combs MD as Consulting Physician (Hematology and Oncology)  Alpa Pineda MD as Referring Physician (Breast Surgery)  Briseyda Simpson MD as Consulting Physician (Gastroenterology)      Consulted for:  Chest pain    Chief Complaint: Chest pain      History of Presenting Illness:      Ms. Amos is a 71 y.o. woman with past medical history notable for history of sarcoidosis found in lymph node, breast cancer status postsurgical resection, mixed hyperlipidemia, and hypothyroidism who presents to the hospital with 2 weeks of neck pain.  She describes the pain as sharp and achy like it worse with movement.  Typically if she lays down the pain does resolve but if she is up moving around or sits up the pain comes back.  Pain is been mild in severity but persistent over the last 2 weeks.  Nothing really seems to make it better or worse.  She was thinking that it was involving her neck muscle she has had lymph node resection from there and other orthopedic issues in that territory but given the persistent symptoms she decided to come to the emergency room.  She denies any associated symptoms such as nausea or diaphoresis.      Review of Systems:  Review of Systems   Constitutional: Positive for malaise/fatigue.   HENT: Negative.    Eyes: Negative.    Cardiovascular: Positive for chest pain.   Respiratory: Negative.    Endocrine: Negative.    Hematologic/Lymphatic: Negative.    Skin: Negative.    Musculoskeletal: Negative.    Gastrointestinal: Negative.    Genitourinary: Negative.    Neurological:  Negative.    Psychiatric/Behavioral: Negative.    Allergic/Immunologic: Negative.        Medications:    Current Facility-Administered Medications:   •  atorvastatin (LIPITOR) tablet 10 mg, 10 mg, Oral, Nightly, Qadah, Abdalhakim, APRN, 10 mg at 05/17/22 2034  •  levothyroxine (SYNTHROID, LEVOTHROID) tablet 100 mcg, 100 mcg, Oral, Daily, Qadah, Abdalhakim, APRN  •  pantoprazole (PROTONIX) EC tablet 40 mg, 40 mg, Oral, QAM, Qadah, Abdalhakim, APRN, 40 mg at 05/18/22 0810  •  [COMPLETED] Insert peripheral IV, , , Once **AND** sodium chloride 0.9 % flush 10 mL, 10 mL, Intravenous, PRN, Tay Hong MD  •  sodium chloride 0.9 % flush 10 mL, 10 mL, Intravenous, Q12H, Ronnie Nova MD, 10 mL at 05/18/22 0814  •  sodium chloride 0.9 % flush 10 mL, 10 mL, Intravenous, PRN, Ronnie Nova MD    Allergies   Allergen Reactions   • Hydrocodone Shortness Of Breath   • Oxycodone-Acetaminophen Shortness Of Breath   • Percocet [Oxycodone-Acetaminophen] Shortness Of Breath   • Sulfa Antibiotics      Stomach pain   • Penicillins Unknown - Low Severity     CHILDHOOD REACTION       Past Medical History:   Diagnosis Date   • Acid reflux    • Arthritis    • Breast cancer (HCC)     Left, stage IA ([id number]) ER/HI positive, HER2/negative, invasive mammary carcinoma    • Depression    • Disease of thyroid gland    • Fat necrosis     Superior to the left breast, benign per biopsy   • H/O Left calf vein thrombosis 10/2011   • History of anemia     Mild   • History of colon polyps    • History of fall     With left soulder and knee injuries   • History of osteopenia    • History of transfusion    • Hypercholesteremia    • Hypothyroidism    • Incisional hernia     Of the abdomen status post repair by Dr. Zachary Brown Jr.   • Joint pain    • Meniscus tear     LEFT KNEE   • Sarcoidosis     DORMANT       Past Surgical History:   Procedure Laterality Date   • BREAST BIOPSY     • BREAST SURGERY  2013    BILATERAL MASTECTOMY WITH  LEFT BREAST AXILLARY LYMPH NODE DISSECTION   • COLONOSCOPY  2013   • COLONOSCOPY N/A 11/5/2018    Procedure: COLONOSCOPY to cecum  and TI:  biopsy polypectomies;  Surgeon: Briseyda Simpson MD;  Location: [facility] ENDOSCOPY;  Service: Gastroenterology   • ENDOSCOPY N/A 11/5/2018    Procedure: ESOPHAGOGASTRODUODENOSCOPY with biospsies and polypectomy;  Surgeon: Briseyda Simpson MD;  Location: [facility] ENDOSCOPY;  Service: Gastroenterology   • HERNIA REPAIR N/A 08/07/2015    ventral hernia repair by Dr. Brown   • HYSTERECTOMY  1990    WITH BILATERA SALPINGO OOPHERECTOMY   • KNEE ARTHROPLASTY Right 2011   • KNEE ARTHROPLASTY Left 03/25/2016    Partial replacement   • KNEE ARTHROPLASTY UNICOMPARTMENTAL Left 3/25/2016    Procedure: LT KNEE ARTHROPLASTY UNICOMPARTMENTAL;  Surgeon: Freddy Mast MD;  Location: [facility] MAIN OR;  Service:    • MASTECTOMY     • ORIF FINGER FRACTURE Right 2014    HAND LITTLE FINGER   • RECONSTRUCTION BREAST W/ TRAM FLAP Bilateral 2014    Dr. Alex   • RHINOPLASTY     • TONSILLECTOMY  1953   • WISDOM TOOTH EXTRACTION  1960'S    FOUR       Social History     Socioeconomic History   • Marital status:      Spouse name: Irving   • Number of children: 3   Tobacco Use   • Smoking status: Never Smoker   • Smokeless tobacco: Never Used   Substance and Sexual Activity   • Alcohol use: No   • Drug use: No   • Sexual activity: Defer       Family History   Problem Relation Age of Onset   • Breast cancer Paternal Aunt 55   • Deep vein thrombosis Other    • Heart disease Other    • Hypertension Other    • Diabetes Other    • Cancer Other    • Kidney disease Other    • Glaucoma Other    • Heart failure Mother    • Hypertension Mother    • Breast cancer Maternal Aunt 65   • Breast cancer Cousin    • Stroke Father    • Alcohol abuse Father    • Thyroid disease Sister    • Depression Sister        The following portions of the patient's history were reviewed and updated as appropriate: allergies, current  medications, past family history, past medical history, past social history, past surgical history and problem list.         Objective:      Temp:  [97.5 °F (36.4 °C)-98.1 °F (36.7 °C)] 97.5 °F (36.4 °C)  Heart Rate:  [70-90] 84  Resp:  [16-18] 18  BP: (109-148)/(62-84) 148/83   No intake or output data in the 24 hours ending 05/18/22 0852  Body mass index is 29.7 kg/m².      05/17/22  1504 05/17/22  1719   Weight: 83.5 kg (184 lb) 83.5 kg (184 lb)           Physical Exam:  Constitutional: Well appearing, well developed, no acute distress   HENT: Oropharynx clear and membrane moist  Eyes: Normal conjunctiva, no sclera icterus.  Neck: Supple, no carotid bruit bilaterally.  Cardiovascular: Regular rate and rhythm, No Murmur, No bilateral lower extremity edema.  Pulmonary: Normal respiratory effort, normal lung sounds, no wheezing.  Abdominal: Soft, nontender, no hepatosplenomegaly, liver is non-pulsatile.  Neurological: Alert and orient x 3.   Skin: Warm, dry, no ecchymosis, no rash.  Psych: Appropriate mood and affect. Normal judgment and insight.         Lab Review:   Results from last 7 days   Lab Units 05/18/22  0513 05/17/22  1517   SODIUM mmol/L 140 138   POTASSIUM mmol/L 3.9 4.3   CHLORIDE mmol/L 107 103   CO2 mmol/L 22.8 25.0   BUN mg/dL 16 20   CREATININE mg/dL 0.78 0.87   GLUCOSE mg/dL 87 95   CALCIUM mg/dL 9.0 9.6   AST (SGOT) U/L  --  24   ALT (SGPT) U/L  --  22     Results from last 7 days   Lab Units 05/18/22  0513 05/17/22  1746 05/17/22  1517   TROPONIN T ng/mL <0.010 <0.010 <0.010     Results from last 7 days   Lab Units 05/18/22  0513 05/17/22  1517   WBC 10*3/mm3 6.21 6.02   HEMOGLOBIN g/dL 12.3 13.0   HEMATOCRIT % 37.9 40.3   PLATELETS 10*3/mm3 163 192         Results from last 7 days   Lab Units 05/18/22  0513   MAGNESIUM mg/dL 2.3     Results from last 7 days   Lab Units 05/17/22  1746   CHOLESTEROL mg/dL 148   TRIGLYCERIDES mg/dL 66   HDL CHOL mg/dL 56     The 10-year ASCVD risk score (Oneyda MICHELLE  , et al., 2013) is: 12.7%    Values used to calculate the score:      Age: 71 years      Sex: Female      Is Non- : No      Diabetic: No      Tobacco smoker: No      Systolic Blood Pressure: 148 mmHg      Is BP treated: No      HDL Cholesterol: 56 mg/dL      Total Cholesterol: 148 mg/dL     Results from last 7 days   Lab Units 05/17/22  1517   PROBNP pg/mL 22.6     Results from last 7 days   Lab Units 05/17/22  1746   TSH uIU/mL 3.630     EKG on 5/18/22-      EKG on 5/17/22-    EKG reviewed by myself showing sinus rhythm with no acute changes.         Assessment:           Chest pain         Plan:       Ms. Amos is a 71 y.o. woman with past medical history notable for history of sarcoidosis found in lymph node, breast cancer status postsurgical resection, mixed hyperlipidemia, and hypothyroidism who presents to the hospital with 2 weeks of neck pain.  Her symptoms are hard to pin down.  They do have some features of cardiac angina given that there is some exertional component however this could also represent musculoskeletal or chest wall pain.  Like to further evaluate with a treadmill stress MPI to get a better answer of exactly what is going on.  If normal would recommend follow-up with primary care to explore other etiologies for patient's pain      Chest pain:  · And for treadmill stress MPI later today  · VIRGINIA score 1        Thank you for allowing me to participate in the care of Jennifer Amos. Feel free to contact me directly with any further questions or concerns.    Higinio Parsons MD  [facility]  05/18/22  08:52 EDT

## 2022-05-18 NOTE — PROGRESS NOTES
MD ATTESTATION NOTE    The RICARDA and I have discussed this patient's history, physical exam, and treatment plan.  I have reviewed the documentation and personally had a face to face interaction with the patient. I affirm the documentation and agree with the treatment and plan.  The attached note describes my personal findings.      I provided a substantive portion of the care of the patient.  I personally performed the physical exam in its entirety, and below are my findings.  For this patient encounter, the patient wore surgical mask, I wore full protective PPE including N95 and eye protection.      Brief HPI: This patient is a 71-year-old female with a history of breast cancer close to a decade ago admitted to the observation unit for a 2-week span of left-sided chest discomfort.  She reports that the pain does radiate into her neck and shoulder and is made worse by certain position movements.  She denies any associated shortness of breath.    PHYSICAL EXAM  ED Triage Vitals   Temp Heart Rate Resp BP SpO2   05/17/22 1334 05/17/22 1334 05/17/22 1334 05/17/22 1435 05/17/22 1334   98 °F (36.7 °C) 90 18 125/84 95 %      Temp src Heart Rate Source Patient Position BP Location FiO2 (%)   05/17/22 1334 05/17/22 1334 05/17/22 1748 05/17/22 1748 --   Tympanic Monitor Lying Right arm          GENERAL: Resting comfortably and in no acute distress, nontoxic in appearance  HENT: nares patent  EYES: no scleral icterus  CV: regular rhythm, normal rate, no M/R/G  RESPIRATORY: normal effort, lungs clear bilaterally  ABDOMEN: soft, nontender, no rebound or guarding  MUSCULOSKELETAL: no deformity, no edema  NEURO: alert, moves all extremities, follows commands  PSYCH:  calm, cooperative  SKIN: warm, dry    Vital signs and nursing notes reviewed.        Plan: Thus far, the patient's EKG, cardiac enzymes, as well as CTA of the chest are all unremarkable for acute abnormality.  We will monitor her through the evening and ask cardiology  to see in the a.m. for consultation.

## 2022-05-18 NOTE — PLAN OF CARE
Goal Outcome Evaluation:      Pt rested well between care during shift. Pt did complain of left sided chest pain and was given medication-see MAR that did give her some relief. VSS.

## 2022-05-18 NOTE — PLAN OF CARE
Goal Outcome Evaluation:              Outcome Evaluation: Patient being discharged home with advice to follow up with pcp. Patient received dc instructions and verbalized understanding, denied having questions.

## 2022-05-18 NOTE — DISCHARGE SUMMARY
ED OBSERVATION PROGRESS/DISCHARGE SUMMARY    Date of Admission: 5/17/2022   LOS: 0 days   PCP: Berenice Padron MD    Final Diagnosis   chest pain    Patient seen at:  Subjective    Anterior left-sided chest pain has been ongoing for 2 weeks.  It radiates to the left arm.  It is an ache.  Symptoms are minimal at this time.    Hospital Outcome:   Patient admitted for chest pain.  Work-up in the hospital has been unrevealing.  Specifically, patient has had negative serial troponins, nonischemic EKG, negative stress test.  Chest x-ray negative.     Cardiology recommends PCP follow-up to consider alternate etiologies.  Patient in particular would like to have a mammogram to assess the potential for breast cause of her discomfort.    ROS:  General: no fevers, chills  Respiratory: no cough, dyspnea  Cardiovascular: no chest pain, palpitations  Abdomen: No abdominal pain, nausea, vomiting, or diarrhea  Neurologic: No focal weakness    Objective   Physical Exam:  I have reviewed the vital signs.  Temp:  [97.5 °F (36.4 °C)-98.1 °F (36.7 °C)] 97.8 °F (36.6 °C)  Heart Rate:  [68-84] 68  Resp:  [16-18] 18  BP: (109-148)/(62-84) 128/72  General Appearance:    Alert, cooperative, no distress  Head:    Normocephalic, atraumatic  Eyes:    Sclerae anicteric  Neck:   Supple, no mass  Lungs: Clear to auscultation bilaterally, respirations unlabored  Heart: Regular rate and rhythm, S1 and S2 normal, no murmur, rub or gallop  Abdomen:  Soft, non-tender, bowel sounds active, nondistended  Extremities: No clubbing, cyanosis, or edema to lower extremities  Pulses:  2+ and symmetric in distal lower extremities  Skin: No rashes   Neurologic: Oriented x3, Normal strength to extremities    Results Review:    I have reviewed the labs, radiology results and diagnostic studies.    Results from last 7 days   Lab Units 05/18/22  0513   WBC 10*3/mm3 6.21   HEMOGLOBIN g/dL 12.3   HEMATOCRIT % 37.9   PLATELETS 10*3/mm3 163     Results from last  7 days   Lab Units 05/18/22  0513 05/17/22  1517   SODIUM mmol/L 140 138   POTASSIUM mmol/L 3.9 4.3   CHLORIDE mmol/L 107 103   CO2 mmol/L 22.8 25.0   BUN mg/dL 16 20   CREATININE mg/dL 0.78 0.87   CALCIUM mg/dL 9.0 9.6   BILIRUBIN mg/dL  --  0.7   ALK PHOS U/L  --  72   ALT (SGPT) U/L  --  22   AST (SGOT) U/L  --  24   GLUCOSE mg/dL 87 95     Imaging Results (Last 24 Hours)     Procedure Component Value Units Date/Time    CT Angiogram Chest [[id number]] Collected: 05/17/22 1628     Updated: 05/18/22 1144    Narrative:      CT ANGIOGRAM OF THE CHEST. MULTIPLE CORONAL, SAGITTAL, AND 3-D  RECONSTRUCTIONS.     HISTORY: 71-year-old female with persistent anterior left-sided chest  pain for 2 weeks. Breast cancer history.     TECHNIQUE: Radiation dose reduction techniques were utilized, including  automated exposure control and exposure modulation based on body size.   CT angiogram of the chest was performed following the administration of  IV contrast. Multiple coronal, sagittal, and 3-D reconstruction images  were obtained. Compared with chest CTA from 10/25/2011.     FINDINGS: There is adequate opacification of the pulmonary arteries and  there is no convincing evidence for pulmonary thromboemboli. There is  crowding of lung markings at the dependent aspects of the lower lobes.  There is no convincing evidence for acute pneumonia and there are no  pleural or pericardial effusions. There are a few pulmonary cysts which  are stable and there is hyperlucency at the lingula which was present  previously as well. There is no lymphadenopathy within the chest and  there is no lymphadenopathy at the axilla or subpectoral regions. Left  breast reconstruction changes following mastectomy are noted. No rib  fractures or bone abnormality is seen.       Impression:      1. There is no convincing evidence for pulmonary thromboemboli. No acute  abnormality is seen. Please follow-up as clinically indicated.  2. The stable relative  hyperlucency at the lingula is suspected to be  secondary to air trapping.     Discussed with Dr. Hong.     This report was finalized on 5/18/2022 11:41 AM by Dr. Kallie Garcia M.D.       XR Chest 1 View [[id number]] Collected: 05/17/22 1623     Updated: 05/17/22 1627    Narrative:      PORTABLE CHEST 05/17/2022 AT 3:30 PM     CLINICAL HISTORY: Chest pain     Compared to the previous chest dated 08/14/2013.     The heart lungs and mediastinal structures appear within normal limits.  There are no infiltrates or effusions. There is no pneumothorax.     IMPRESSIONS: Normal chest x-ray.     This report was finalized on 5/17/2022 4:24 PM by Dr. Thomas Sanon M.D.             I have reviewed the medications.  ---------------------------------------------------------------------------------------------  Assessment & Plan   Assessment/Problem List    Chest pain      Plan:  Dr. Parsons, cardiology, has evaluated the patient.   Stress test negative    Disposition:   Discharge home    Follow-up after Discharge:   Follow-up with PCP    This note will serve as discharge summary    Quincy Hawthorne II, MD 05/18/22 14:23 EDT

## 2022-05-19 ENCOUNTER — APPOINTMENT (OUTPATIENT)
Dept: CT IMAGING | Facility: HOSPITAL | Age: 72
End: 2022-05-19

## 2022-05-19 ENCOUNTER — TELEPHONE (OUTPATIENT)
Dept: ONCOLOGY | Facility: CLINIC | Age: 72
End: 2022-05-19

## 2022-05-19 ENCOUNTER — HOSPITAL ENCOUNTER (EMERGENCY)
Facility: HOSPITAL | Age: 72
Discharge: HOME | End: 2022-05-19
Attending: EMERGENCY MEDICINE | Admitting: EMERGENCY MEDICINE

## 2022-05-19 VITALS
HEART RATE: 70 BPM | TEMPERATURE: 96.3 F | SYSTOLIC BLOOD PRESSURE: 151 MMHG | DIASTOLIC BLOOD PRESSURE: 88 MMHG | OXYGEN SATURATION: 97 % | RESPIRATION RATE: 18 BRPM

## 2022-05-19 DIAGNOSIS — M54.12 CERVICAL RADICULOPATHY: Primary | ICD-10-CM

## 2022-05-19 LAB
ANION GAP SERPL CALCULATED.3IONS-SCNC: 13.9 MMOL/L (ref 5–15)
BASOPHILS # BLD AUTO: 0.03 10*3/MM3 (ref 0–0.2)
BASOPHILS NFR BLD AUTO: 0.4 % (ref 0–1.5)
BUN SERPL-MCNC: 17 MG/DL (ref 8–23)
BUN/CREAT SERPL: 22.1 (ref 7–25)
CALCIUM SPEC-SCNC: 9.4 MG/DL (ref 8.6–10.5)
CHLORIDE SERPL-SCNC: 104 MMOL/L (ref 98–107)
CO2 SERPL-SCNC: 18.1 MMOL/L (ref 22–29)
CREAT SERPL-MCNC: 0.77 MG/DL (ref 0.57–1)
DEPRECATED RDW RBC AUTO: 44.6 FL (ref 37–54)
EGFRCR SERPLBLD CKD-EPI 2021: 82.6 ML/MIN/1.73
EOSINOPHIL # BLD AUTO: 0.11 10*3/MM3 (ref 0–0.4)
EOSINOPHIL NFR BLD AUTO: 1.5 % (ref 0.3–6.2)
ERYTHROCYTE [DISTWIDTH] IN BLOOD BY AUTOMATED COUNT: 13.5 % (ref 12.3–15.4)
GLUCOSE SERPL-MCNC: 104 MG/DL (ref 65–99)
HCT VFR BLD AUTO: 42.2 % (ref 34–46.6)
HGB BLD-MCNC: 14 G/DL (ref 12–15.9)
IMM GRANULOCYTES # BLD AUTO: 0.02 10*3/MM3 (ref 0–0.05)
IMM GRANULOCYTES NFR BLD AUTO: 0.3 % (ref 0–0.5)
LYMPHOCYTES # BLD AUTO: 1.99 10*3/MM3 (ref 0.7–3.1)
LYMPHOCYTES NFR BLD AUTO: 26.9 % (ref 19.6–45.3)
MCH RBC QN AUTO: 30.1 PG (ref 26.6–33)
MCHC RBC AUTO-ENTMCNC: 33.2 G/DL (ref 31.5–35.7)
MCV RBC AUTO: 90.8 FL (ref 79–97)
MONOCYTES # BLD AUTO: 0.53 10*3/MM3 (ref 0.1–0.9)
MONOCYTES NFR BLD AUTO: 7.2 % (ref 5–12)
NEUTROPHILS NFR BLD AUTO: 4.73 10*3/MM3 (ref 1.7–7)
NEUTROPHILS NFR BLD AUTO: 63.7 % (ref 42.7–76)
NRBC BLD AUTO-RTO: 0 /100 WBC (ref 0–0.2)
PLATELET # BLD AUTO: 184 10*3/MM3 (ref 140–450)
PMV BLD AUTO: 12 FL (ref 6–12)
POTASSIUM SERPL-SCNC: 4.3 MMOL/L (ref 3.5–5.2)
RBC # BLD AUTO: 4.65 10*6/MM3 (ref 3.77–5.28)
SODIUM SERPL-SCNC: 136 MMOL/L (ref 136–145)
TROPONIN T SERPL-MCNC: <0.01 NG/ML (ref 0–0.03)
WBC NRBC COR # BLD: 7.41 10*3/MM3 (ref 3.4–10.8)

## 2022-05-19 PROCEDURE — 93005 ELECTROCARDIOGRAM TRACING: CPT | Performed by: EMERGENCY MEDICINE

## 2022-05-19 PROCEDURE — 84484 ASSAY OF TROPONIN QUANT: CPT | Performed by: EMERGENCY MEDICINE

## 2022-05-19 PROCEDURE — 25010000002 DEXAMETHASONE PER 1 MG: Performed by: EMERGENCY MEDICINE

## 2022-05-19 PROCEDURE — 25010000002 KETOROLAC TROMETHAMINE PER 15 MG: Performed by: EMERGENCY MEDICINE

## 2022-05-19 PROCEDURE — 72125 CT NECK SPINE W/O DYE: CPT

## 2022-05-19 PROCEDURE — 96375 TX/PRO/DX INJ NEW DRUG ADDON: CPT

## 2022-05-19 PROCEDURE — 99283 EMERGENCY DEPT VISIT LOW MDM: CPT

## 2022-05-19 PROCEDURE — 85025 COMPLETE CBC W/AUTO DIFF WBC: CPT | Performed by: EMERGENCY MEDICINE

## 2022-05-19 PROCEDURE — 96374 THER/PROPH/DIAG INJ IV PUSH: CPT

## 2022-05-19 PROCEDURE — 93010 ELECTROCARDIOGRAM REPORT: CPT | Performed by: INTERNAL MEDICINE

## 2022-05-19 PROCEDURE — 80048 BASIC METABOLIC PNL TOTAL CA: CPT | Performed by: EMERGENCY MEDICINE

## 2022-05-19 RX ORDER — KETOROLAC TROMETHAMINE 15 MG/ML
15 INJECTION, SOLUTION INTRAMUSCULAR; INTRAVENOUS ONCE
Status: COMPLETED | OUTPATIENT
Start: 2022-05-19 | End: 2022-05-19

## 2022-05-19 RX ORDER — LIDOCAINE 50 MG/G
1 PATCH TOPICAL EVERY 24 HOURS
Qty: 30 EACH | Refills: 0 | Status: SHIPPED | OUTPATIENT
Start: 2022-05-19 | End: 2022-07-11 | Stop reason: SDDI

## 2022-05-19 RX ORDER — DEXAMETHASONE SODIUM PHOSPHATE 10 MG/ML
8 INJECTION INTRAMUSCULAR; INTRAVENOUS ONCE
Status: COMPLETED | OUTPATIENT
Start: 2022-05-19 | End: 2022-05-19

## 2022-05-19 RX ORDER — KETOROLAC TROMETHAMINE 10 MG/1
10 TABLET, FILM COATED ORAL EVERY 6 HOURS PRN
Qty: 20 TABLET | Refills: 0 | Status: SHIPPED | OUTPATIENT
Start: 2022-05-19 | End: 2022-07-11 | Stop reason: SDDI

## 2022-05-19 RX ORDER — METHYLPREDNISOLONE 4 MG/1
TABLET ORAL
Qty: 21 EACH | Refills: 0 | Status: SHIPPED | OUTPATIENT
Start: 2022-05-19 | End: 2022-07-11 | Stop reason: SDDI

## 2022-05-19 RX ORDER — LIDOCAINE 50 MG/G
1 PATCH TOPICAL
Status: DISCONTINUED | OUTPATIENT
Start: 2022-05-19 | End: 2022-05-19 | Stop reason: HOSPADM

## 2022-05-19 RX ADMIN — KETOROLAC TROMETHAMINE 15 MG: 15 INJECTION, SOLUTION INTRAMUSCULAR; INTRAVENOUS at 11:42

## 2022-05-19 RX ADMIN — LIDOCAINE 1 PATCH: 50 PATCH TOPICAL at 11:41

## 2022-05-19 RX ADMIN — DEXAMETHASONE SODIUM PHOSPHATE 8 MG: 10 INJECTION, SOLUTION INTRAMUSCULAR; INTRAVENOUS at 14:25

## 2022-05-19 NOTE — ED NOTES
Pt arrives via PV with c/o chest pain that radiates to left arm. Was admitted here for observation and discharged yesterday for same. Pt a&ox4, abc's intact, NAD noted, ambulatory to triage.    Patient wearing mask during triage. RN wearing appropriate PPE during triage. Hand hygiene performed.

## 2022-05-19 NOTE — ED PROVIDER NOTES
EMERGENCY DEPARTMENT ENCOUNTER    Room Number:  24/24  Date of encounter:  5/19/2022  PCP: Berenice Padron MD  Historian: Patient      HPI:  Chief Complaint: Chest pain      Context: Jennifer Amos is a 71 y.o. female who presents to the ED c/o left chest pain with radiation down her left arm for 2 weeks.  She saw her PCP initially thought was musculoskeletal and advised her to do shoulder exercises.  She presented to our emergency room 2 days ago and was admitted for her chest pain had negative serial troponins, negative stress test and negative CT angiogram of the chest.  Patient was discharged to follow-up with her PCP as an outpatient, however the pain has not been resolved and thus she presents back to the emergency room today for further evaluation.  She states once began she had left neck pain associated with it but that has resolved at this time.  She denies any rash.  She denies fevers, chills, cough, abdominal pain, back pain, lower extreme pain, lower extreme swelling or focal neurodeficit      PAST MEDICAL HISTORY  Active Ambulatory Problems     Diagnosis Date Noted   • OA (osteoarthritis) of knee 03/24/2016   • Senile osteoporosis 05/06/2016   • History of breast cancer 05/18/2016   • Neuritis of foot 07/13/2016   • Primary osteoarthritis of right foot 07/13/2016   • Paresthesia of right foot 07/13/2016   • Mucous cyst of toe 07/13/2016   • Osteopenia determined by x-ray 07/13/2016   • Bunionette 08/29/2016   • Urinary tract infection 10/21/2016   • Osteoporosis 05/02/2017   • Osteopenia 05/11/2017   • Bunionette of right foot 08/16/2017   • Arthritis of foot 08/16/2017   • Closed nondisplaced fracture of proximal phalanx of lesser toe of left foot 08/16/2017   • Other fatigue 10/02/2017   • Microcytic anemia 09/12/2018   • Hematochezia 09/12/2018   • Iron deficiency anemia 10/23/2018   • Chest pain 05/17/2022     Resolved Ambulatory Problems     Diagnosis Date Noted   • No Resolved  Ambulatory Problems     Past Medical History:   Diagnosis Date   • Acid reflux    • Arthritis    • Breast cancer (HCC)    • Depression    • Disease of thyroid gland    • Fat necrosis    • H/O Left calf vein thrombosis 10/2011   • History of anemia    • History of colon polyps    • History of fall    • History of osteopenia    • History of transfusion    • Hypercholesteremia    • Hypothyroidism    • Incisional hernia    • Joint pain    • Meniscus tear    • Sarcoidosis          PAST SURGICAL HISTORY  Past Surgical History:   Procedure Laterality Date   • BREAST BIOPSY     • BREAST SURGERY  2013    BILATERAL MASTECTOMY WITH LEFT BREAST AXILLARY LYMPH NODE DISSECTION   • COLONOSCOPY  2013   • COLONOSCOPY N/A 11/5/2018    Procedure: COLONOSCOPY to cecum  and TI:  biopsy polypectomies;  Surgeon: Briseyda Simpson MD;  Location: [facility] ENDOSCOPY;  Service: Gastroenterology   • ENDOSCOPY N/A 11/5/2018    Procedure: ESOPHAGOGASTRODUODENOSCOPY with biospsies and polypectomy;  Surgeon: Briseyda Simpson MD;  Location: [facility] ENDOSCOPY;  Service: Gastroenterology   • HERNIA REPAIR N/A 08/07/2015    ventral hernia repair by Dr. Brown   • HYSTERECTOMY  1990    WITH BILATERA SALPINGO OOPHERECTOMY   • KNEE ARTHROPLASTY Right 2011   • KNEE ARTHROPLASTY Left 03/25/2016    Partial replacement   • KNEE ARTHROPLASTY UNICOMPARTMENTAL Left 3/25/2016    Procedure: LT KNEE ARTHROPLASTY UNICOMPARTMENTAL;  Surgeon: Freddy Mast MD;  Location: [facility] OR;  Service:    • MASTECTOMY     • ORIF FINGER FRACTURE Right 2014    HAND LITTLE FINGER   • RECONSTRUCTION BREAST W/ TRAM FLAP Bilateral 2014    Dr. Alex   • RHINOPLASTY     • TONSILLECTOMY  1953   • WISDOM TOOTH EXTRACTION  1960'S    FOUR         FAMILY HISTORY  Family History   Problem Relation Age of Onset   • Breast cancer Paternal Aunt 55   • Deep vein thrombosis Other    • Heart disease Other    • Hypertension Other    • Diabetes Other    • Cancer Other    • Kidney disease Other    •  Glaucoma Other    • Heart failure Mother    • Hypertension Mother    • Breast cancer Maternal Aunt 65   • Breast cancer Cousin    • Stroke Father    • Alcohol abuse Father    • Thyroid disease Sister    • Depression Sister          SOCIAL HISTORY  Social History     Socioeconomic History   • Marital status:      Spouse name: Irving   • Number of children: 3   Tobacco Use   • Smoking status: Never Smoker   • Smokeless tobacco: Never Used   Substance and Sexual Activity   • Alcohol use: No   • Drug use: No   • Sexual activity: Defer         ALLERGIES  Hydrocodone, Oxycodone-acetaminophen, Percocet [oxycodone-acetaminophen], Sulfa antibiotics, and Penicillins        REVIEW OF SYSTEMS  Review of Systems       All systems reviewed and negative except for those discussed in HPI.     PHYSICAL EXAM    I have reviewed the triage vital signs and nursing notes.    ED Triage Vitals   Temp Heart Rate Resp BP SpO2   05/19/22 1109 05/19/22 1109 05/19/22 1109 05/19/22 1117 05/19/22 1109   96.3 °F (35.7 °C) 84 18 146/84 97 %      Temp src Heart Rate Source Patient Position BP Location FiO2 (%)   05/19/22 1109 05/19/22 1109 -- -- --   Tympanic Monitor          GENERAL: 71-year-old well developed, well nourished in no acute distress  HENT: NCAT, neck supple, trachea midline  EYES: no scleral icterus, PERRL, normal conjunctivae  CV: regular rhythm, regular rate, no murmur  RESPIRATORY: unlabored effort, CTAB  ABDOMEN: soft, nontender, nondistended, bowel sounds present  MUSCULOSKELETAL: no gross deformity, no pedal edema, no calf tenderness  NEURO: alert,  sensory and motor function of extremities intact, speech clear, mental status normal  SKIN: warm, dry, no rash  PSYCH:  Appropriate mood and affect      PPE  Pt does not present with symptoms for COVID19; however, I was wearing a mask and goggles throughout all patient interaction.    Vital signs and nursing notes reviewed.      LAB RESULTS  Recent Results (from the past 24  hour(s))   ECG 12 Lead    Collection Time: 05/19/22 11:19 AM   Result Value Ref Range    QT Interval 384 ms   Basic Metabolic Panel    Collection Time: 05/19/22 11:35 AM    Specimen: Blood   Result Value Ref Range    Glucose 104 (H) 65 - 99 mg/dL    BUN 17 8 - 23 mg/dL    Creatinine 0.77 0.57 - 1.00 mg/dL    Sodium 136 136 - 145 mmol/L    Potassium 4.3 3.5 - 5.2 mmol/L    Chloride 104 98 - 107 mmol/L    CO2 18.1 (L) 22.0 - 29.0 mmol/L    Calcium 9.4 8.6 - 10.5 mg/dL    BUN/Creatinine Ratio 22.1 7.0 - 25.0    Anion Gap 13.9 5.0 - 15.0 mmol/L    eGFR 82.6 >60.0 mL/min/1.73   Troponin    Collection Time: 05/19/22 11:35 AM    Specimen: Blood   Result Value Ref Range    Troponin T <0.010 0.000 - 0.030 ng/mL   CBC Auto Differential    Collection Time: 05/19/22 11:35 AM    Specimen: Blood   Result Value Ref Range    WBC 7.41 3.40 - 10.80 10*3/mm3    RBC 4.65 3.77 - 5.28 10*6/mm3    Hemoglobin 14.0 12.0 - 15.9 g/dL    Hematocrit 42.2 34.0 - 46.6 %    MCV 90.8 79.0 - 97.0 fL    MCH 30.1 26.6 - 33.0 pg    MCHC 33.2 31.5 - 35.7 g/dL    RDW 13.5 12.3 - 15.4 %    RDW-SD 44.6 37.0 - 54.0 fl    MPV 12.0 6.0 - 12.0 fL    Platelets 184 140 - 450 10*3/mm3    Neutrophil % 63.7 42.7 - 76.0 %    Lymphocyte % 26.9 19.6 - 45.3 %    Monocyte % 7.2 5.0 - 12.0 %    Eosinophil % 1.5 0.3 - 6.2 %    Basophil % 0.4 0.0 - 1.5 %    Immature Grans % 0.3 0.0 - 0.5 %    Neutrophils, Absolute 4.73 1.70 - 7.00 10*3/mm3    Lymphocytes, Absolute 1.99 0.70 - 3.10 10*3/mm3    Monocytes, Absolute 0.53 0.10 - 0.90 10*3/mm3    Eosinophils, Absolute 0.11 0.00 - 0.40 10*3/mm3    Basophils, Absolute 0.03 0.00 - 0.20 10*3/mm3    Immature Grans, Absolute 0.02 0.00 - 0.05 10*3/mm3    nRBC 0.0 0.0 - 0.2 /100 WBC       Ordered the above labs and independently reviewed the results.        RADIOLOGY  CT Cervical Spine Without Contrast    Result Date: 5/19/2022  EMERGENCY NONCONTRAST CT SCAN OF CERVICAL SPINE 05/19/2022  CLINICAL HISTORY: Neck pain and left radicular  pain.  TECHNIQUE: Spiral CT images were obtained from the skull base down to the T3-T4 thoracic level and images were reformatted and are submitted in 2 mm thick axial and sagittal CT sections with soft tissue algorithm and 1 mm thick axial, sagittal and coronal CT sections with high-resolution bone algorithm.  COMPARISON: This is correlated to a prior MRI of the cervical spine from [facility] on 06/02/2016. There are no additional prior cervical spine imaging studies for comparison.  FINDINGS: The atlantooccipital articulation is within normal limits.  At C1-C2, there are arthritic changes at the atlantodental interval with marginal spurring off the anterior ring of C1 and the odontoid. Otherwise, C1-C2 level is normal in appearance.  At C2-C3, the disc space, facets and uncovertebral joints are within normal limits and there is no canal or foraminal narrowing.  At C3-C4, there is moderate left facet overgrowth. There is a posterior central disc protrusion that abuts the central to left ventral cord, mildly narrowing the central left side of the canal. There is some minimal uncovertebral joint hypertrophy. There is no right foraminal narrowing. Left facet spurs mild to moderately narrow the left neural foramen, could affect the exiting left C4 nerve root.  At C4-C5, there is mild bilateral facet overgrowth. The disc space is normal. There is no canal or foraminal narrowing.  At C5-C6, there is mild-to-moderate right facet overgrowth. Left facets are normal. There is moderate disc space narrowing and there are degenerative endplate changes, degenerative endplate sclerosis, most pronounced in the right side of the endplates. There is mild diffuse posterior disc osteophyte complex, contributes to mild canal narrowing. There is a tiny 2 mm bubble of air along the left paracentral disc margin, likely air extending from vacuum disc phenomenon into small left paracentral disc protrusion. There is mild left  uncovertebral joint hypertrophy and there is mild left bony foraminal narrowing. There is more prominent right-sided uncovertebral joint hypertrophy in combination with facet overgrowth contributes to moderate-to-severe right bony foraminal narrowing, which could compromise the exiting right C6 nerve root.  At C6-C7, there is disc space narrowing and there are degenerative endplate changes, and there is mild posterior spurring, only minimal canal narrowing. The left facets and uncovertebral joints are normal. There is no left foraminal narrowing. There is mild right facet overgrowth. There is prominent right-sided uncovertebral joint hypertrophy and the right uncovertebral joint spurs, moderate-to-severely narrow the right foramen, could affect the exiting right C7 nerve root.  At C7-T1, there is mild-to-moderate right facet overgrowth, right facet spurs hook into the posterior superior right foramen, mildly narrowing the right neural foramen. There is some left uncovertebral joint spurring, mildly narrowing the left neural foramen. Posterior disc margin is normal and there is no central canal narrowing.  Cervical vertebral body heights are well-maintained. No acute fracture or osseous abnormality seen.      1. There is cervical spondylosis as described. There are prominent arthritic changes at the atlantodental interval. 2. At C3-C4, there is mild right and moderate left facet overgrowth, and minimal uncovertebral joint hypertrophy, left facet spurs mild to moderately narrow the left neural foramen at C3-C4, and there is a posterior central to left paracentral disc protrusion or small disc herniation measuring 8 x 4 mm mediolateral and anteroposterior dimension, abuts the anterior midline of the left ventral surface of the cord, mildly narrowing the central to left side of the canal. 3. At C5-C6, there is moderate disc space narrowing and degenerative endplate changes, mild diffuse posterior disc osteophyte  complex. There is vacuum disc phenomenon. There is 2 mm focus of air in a tiny left paracentral disc protrusion, but there appears to be only mild canal narrowing. There is mild left bony foraminal narrowing. There is prominent right uncovertebral joint hypertrophy contributing to moderate-to-severe right bony foraminal narrowing, could affect the exiting right C6 nerve root. 4. At C6-C7, there is disc space narrowing and degenerative endplate changes, mild posterior endplate spurring, but only minimal if any canal narrowing. There is prominent right-sided uncovertebral joint hypertrophy that moderate to severely narrows the right neural foramen, could affect the exiting right C7 nerve root. 5. At C7-T1, there is mild-to-moderate right facet overgrowth with mild right bony foraminal narrowing. The remainder of the cervical spine CT is unremarkable.  Radiation dose reduction techniques were utilized, including automated exposure control and exposure modulation based on body size.  This report was finalized on 5/19/2022 3:27 PM by Dr. Irving Montoya M.D.        I ordered the above noted radiological studies. Independently reviewed by me and discussed with radiologist.  See dictation above for official radiology interpretation.      PROCEDURES    Procedures        MEDICATIONS GIVEN IN ER    Medications   ketorolac (TORADOL) injection 15 mg (15 mg Intravenous Given 5/19/22 1142)   dexamethasone (DECADRON) injection 8 mg (8 mg Intravenous Given 5/19/22 1425)         PROGRESS, DATA ANALYSIS, CONSULTS, AND MEDICAL DECISION MAKING    All labs have been independently reviewed by me.  All radiology studies have been reviewed by me and discussed with radiologist dictating report.   EKG's independently reviewed by me.  Discussion below represents my analysis of pertinent findings related to patient's condition, differential diagnosis, treatment plan and final disposition.      ED Course as of 05/19/22 1759   u May 19, 2022    1122 EKG    EKG time: 1119  Rhythm/Rate: Normal sinus rhythm at 74  Normal EKG  Normal ST segments  Normal intervals  No change compared to prior on 5/18/2022    Interpreted Contemporaneously by me.  Independently viewed by me     [GP]   1135 The patient presents with 2 weeks of left chest and left arm pain and has had negative work-up after admission here yesterday with a negative stress test and CT angiogram of the chest.  The patient states her pain is no better and thus came back to the emergency room today.  She states her pain initially began with neck pain and thus I will obtain a CT of her neck while checking labs and an EKG for complete work-up.  The patient is currently requesting no narcotics for her pain and thus I will treat her with Toradol and a Lidoderm patch. [GP]   1402 Patient CT of her neck shows a disc protrusion at C4 on the left which I think is consistent with her exam.  On reexam the patient states she feels markedly better after the Lidoderm patch and Toradol.  I advised her that I believe she has cervical radiculopathy and that I will give her a dose of steroids in the ER prior to discharge and then I will discharge her on a steroid taper, Lidoderm patches and Toradol as needed with outpatient PCP follow-up.  The patient understands and agrees with the plan. [GP]      ED Course User Index  [GP] Villa Thomas MD           The differential diagnosis includes but is not limited to myocardial infarction, acute coronary syndrome, pericarditis, chest wall pain, pneumonia, pulmonary embolism, pneumothorax, or esophageal spasm.        AS OF 17:59 EDT VITALS:    BP - 151/88  HR - 70  TEMP - 96.3 °F (35.7 °C) (Tympanic)  02 SATS - 97%        DIAGNOSIS  Final diagnoses:   Cervical radiculopathy         DISPOSITION  Discharged        EMR Dragon/Transcription disclaimer:   Much of this encounter note is an electronic transcription/translation of spoken language to printed text.        William  MD Villa  05/19/22 [id number]

## 2022-05-19 NOTE — TELEPHONE ENCOUNTER
Patient verbalized pain in her left chest and arm. Patient went to the ER and had a complete work up and was told her pain was not cardiac related. Patient is concerned that she may have a recurrence of her breast cancer and was wanting to have her mammogram done as soon as possible. Dr Eason (Doc #2) reviewed CT scan and labs from ER visit and stated the scan are negative for any concerns of cardiac issues or breast cancer and advised patient to follow up with PCP.

## 2022-05-20 LAB — QT INTERVAL: 384 MS

## 2022-05-24 ENCOUNTER — APPOINTMENT (OUTPATIENT)
Dept: ULTRASOUND IMAGING | Facility: HOSPITAL | Age: 72
End: 2022-05-24

## 2022-05-24 ENCOUNTER — APPOINTMENT (OUTPATIENT)
Dept: MAMMOGRAPHY | Facility: HOSPITAL | Age: 72
End: 2022-05-24

## 2022-05-25 ENCOUNTER — TRANSCRIBE ORDERS (OUTPATIENT)
Dept: ADMINISTRATIVE | Facility: HOSPITAL | Age: 72
End: 2022-05-25

## 2022-05-25 DIAGNOSIS — M48.02 SPINAL STENOSIS IN CERVICAL REGION: ICD-10-CM

## 2022-05-25 DIAGNOSIS — M47.22 CERVICAL SPONDYLOSIS WITH RADICULOPATHY: ICD-10-CM

## 2022-05-25 DIAGNOSIS — M50.20 CERVICAL HERNIATED DISC: Primary | ICD-10-CM

## 2022-06-03 ENCOUNTER — TRANSCRIBE ORDERS (OUTPATIENT)
Dept: PHYSICAL THERAPY | Facility: HOSPITAL | Age: 72
End: 2022-06-03

## 2022-06-03 DIAGNOSIS — M47.812 CERVICAL SPONDYLOSIS WITHOUT MYELOPATHY: Primary | ICD-10-CM

## 2022-06-03 DIAGNOSIS — M48.02 FORAMINAL STENOSIS OF CERVICAL REGION: ICD-10-CM

## 2022-06-10 ENCOUNTER — APPOINTMENT (OUTPATIENT)
Dept: MRI IMAGING | Facility: HOSPITAL | Age: 72
End: 2022-06-10

## 2022-06-13 ENCOUNTER — APPOINTMENT (OUTPATIENT)
Dept: ULTRASOUND IMAGING | Facility: HOSPITAL | Age: 72
End: 2022-06-13

## 2022-06-13 ENCOUNTER — APPOINTMENT (OUTPATIENT)
Dept: MAMMOGRAPHY | Facility: HOSPITAL | Age: 72
End: 2022-06-13

## 2022-07-06 NOTE — PROGRESS NOTES
"[facility] OUTPATIENT CLINIC FOLLOW UP VISIT    REASON FOR FOLLOW-UP:    1.  History of stage I a (pT1b, N0) hormone receptor positive HER-2 negative invasive mammary carcinoma of the lower inner quadrant of the left breast.  11 mm abnormality by imaging.  7 mm invasive carcinoma at biopsy.  She had a left mastectomy with TRAM flap reconstruction.  5 mm grade 2 tumor at resection.  DCIS measured 1.2 cm at resection.      2.  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  3.  Anastrozole 1 mg daily was initiated on 10/16/2013.  Discontinued in October 2018.  4.  History of fat necrosis above the left breast in November 2013.    HISTORY OF PRESENT ILLNESS:  Jennifer Amos is a 72 y.o. female who returns today for follow up of the above issue.      Brief admission in May with chest pain and a negative cardiac evaluation. Mammogram is scheduled on 7/26.    She notes an abnormal fatty deposit beneath the left breast.  No pain but it does bother her.  She denies any new problems with her breasts.  She has occasional pain there but nothing new.  She remains depressed regarding her 's death.  She recently joined a support group.    She has been dieting and lost weight.      REVIEW OF SYSTEMS:  As per the HPI    Vitals:    07/11/22 1230   BP: 131/84   Pulse: 77   Resp: 16   Temp: 98 °F (36.7 °C)   TempSrc: Temporal   SpO2: 98%   Weight: 84 kg (185 lb 3.2 oz)   Height: 167.6 cm (65.98\")   PainSc: 0-No pain  Comment: breast cancer       PHYSICAL EXAMINATION:  General:  No acute distress, awake, alert and oriented  Skin:  Warm and dry, no visible rash  HEENT:  normocephalic/atraumatic.  Wearing a face mask.  Hearing loss noted.  Chest:  Normal respiratory effort.  Lungs clear to auscultation bilaterally.  Heart: Regular rate and rhythm without murmurs gallops or rubs  Extremities:  No visible clubbing, cyanosis, or edema  Neuro/psych:  Grossly non-focal.  Normal mood and " affect.  Breasts: Both breasts were examined again today.  Left TRAM flap.  Right breast status post reduction.  No nodules present in either breast today and she did not complain of any tenderness.  No axillary adenopathy present today.  Fatty deposit just below the left breast in the lower chest.    DIAGNOSTIC DATA:  CBC & Differential (07/11/2022 12:19)      IMAGING:   None reviewed    ASSESSMENT:  This is a 72 y.o. 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    *History of stage I hormone receptor positive HER-2 negative invasive mammary carcinoma of the lower inner quadrant of the left breast.    · She had a left mastectomy with TRAM flap reconstruction.    · Anastrozole 1 mg daily was initiated October 2013.    · She had a very low Oncotype DX recurrence score.    · 5 years of therapy was complete in October 2018.  · Mammogram 7/19/21 BI-RADS category 2    *History of fat necrosis of the left breast    *Osteopenia:  She gets annual Reclast by primary care    *Frequent urinary tract infections: She saw Dr. Baires with urogynecology with no explanation. Vaginal estrogen suggested but not pursued.  Not discussed today.    *History of iron deficiency anemia:   · She saw Dr. Simpson with gastroenterology.  She had a couple of polyps removed from her colon.  She was found to have H. pylori gastritis.  Treatment complete.    · Hemoglobin remains normal.    *Hearing loss:   · She states this occurred shortly after starting the anastrozole.  Difficult to prove whether the medication cause this or not.    · She never really mentioned that to me while she was on the anastrazole.    · She is off of anastrozole at this point.  No improvement.    · Hearing loss persists.  · Stable as of 1/3/2022  · 7/11/2022: No complaints of this today    *Depression and weight gain: She joined a support group.  She is now losing weight.    *Deposit of adipose tissue below the left breast.    PLAN:   1. Follow-up in  6 months with a CBC and breast examination  2. She may follow-up with plastic surgery regarding the adipose tissue below the left breast    I spent 40 minutes in this visit today reviewing her record communicating with her examining her placing orders and documenting the encounter

## 2022-07-07 ENCOUNTER — HOSPITAL ENCOUNTER (OUTPATIENT)
Dept: PHYSICAL THERAPY | Facility: HOSPITAL | Age: 72
Setting detail: THERAPIES SERIES
Discharge: HOME | End: 2022-07-07

## 2022-07-07 DIAGNOSIS — Z74.09 IMPAIRED MOBILITY: ICD-10-CM

## 2022-07-07 DIAGNOSIS — M54.2 CERVICALGIA: Primary | ICD-10-CM

## 2022-07-07 PROCEDURE — 97530 THERAPEUTIC ACTIVITIES: CPT | Performed by: PHYSICAL THERAPIST

## 2022-07-07 PROCEDURE — 97161 PT EVAL LOW COMPLEX 20 MIN: CPT | Performed by: PHYSICAL THERAPIST

## 2022-07-07 NOTE — THERAPY EVALUATION
Outpatient Physical Therapy Ortho Initial Evaluation  [facility]     Patient Name: Jennifer Amos  : 1950  MRN: [id number]  Today's Date: 2022      Visit Date: 2022    Patient Active Problem List   Diagnosis   • OA (osteoarthritis) of knee   • Senile osteoporosis   • History of breast cancer   • Neuritis of foot   • Primary osteoarthritis of right foot   • Paresthesia of right foot   • Mucous cyst of toe   • Osteopenia determined by x-ray   • Bunionette   • Urinary tract infection   • Osteoporosis   • Osteopenia   • Bunionette of right foot   • Arthritis of foot   • Closed nondisplaced fracture of proximal phalanx of lesser toe of left foot   • Other fatigue   • Microcytic anemia   • Hematochezia   • Iron deficiency anemia   • Chest pain        Past Medical History:   Diagnosis Date   • Acid reflux    • Arthritis    • Breast cancer (HCC)     Left, stage IA ([id number]) ER/WI positive, HER2/negative, invasive mammary carcinoma    • Depression    • Disease of thyroid gland    • Fat necrosis     Superior to the left breast, benign per biopsy   • H/O Left calf vein thrombosis 10/2011   • History of anemia     Mild   • History of colon polyps    • History of fall     With left soulder and knee injuries   • History of osteopenia    • History of transfusion    • Hypercholesteremia    • Hypothyroidism    • Incisional hernia     Of the abdomen status post repair by Dr. Zachary Brown Jr.   • Joint pain    • Meniscus tear     LEFT KNEE   • Sarcoidosis     DORMANT        Past Surgical History:   Procedure Laterality Date   • BREAST BIOPSY     • BREAST SURGERY      BILATERAL MASTECTOMY WITH LEFT BREAST AXILLARY LYMPH NODE DISSECTION   • COLONOSCOPY     • COLONOSCOPY N/A 2018    Procedure: COLONOSCOPY to cecum  and TI:  biopsy polypectomies;  Surgeon: Briseyda Simpson MD;  Location: [facility] ENDOSCOPY;  Service: Gastroenterology   • ENDOSCOPY N/A 2018    Procedure:  ESOPHAGOGASTRODUODENOSCOPY with biospsies and polypectomy;  Surgeon: Briseyda Simpson MD;  Location: [facility] ENDOSCOPY;  Service: Gastroenterology   • HERNIA REPAIR N/A 08/07/2015    ventral hernia repair by Dr. Brown   • HYSTERECTOMY  1990    WITH BILATERA SALPINGO OOPHERECTOMY   • KNEE ARTHROPLASTY Right 2011   • KNEE ARTHROPLASTY Left 03/25/2016    Partial replacement   • KNEE ARTHROPLASTY UNICOMPARTMENTAL Left 3/25/2016    Procedure: LT KNEE ARTHROPLASTY UNICOMPARTMENTAL;  Surgeon: Freddy Mast MD;  Location: [facility] MAIN OR;  Service:    • MASTECTOMY     • ORIF FINGER FRACTURE Right 2014    HAND LITTLE FINGER   • RECONSTRUCTION BREAST W/ TRAM FLAP Bilateral 2014    Dr. Alex   • RHINOPLASTY     • TONSILLECTOMY  1953   • WISDOM TOOTH EXTRACTION  1960'S    FOUR       Visit Dx:     ICD-10-CM ICD-9-CM   1. Cervicalgia  M54.2 723.1   2. Impaired mobility  Z74.09 799.89          Patient History     Row Name 07/07/22 1400             History    Chief Complaint Pain  -GJ      Type of Pain Neck pain  -GJ      Date Current Problem(s) Began 05/17/22  -GJ      Brief Description of Current Complaint Ms. Amos is a 73 y/o R handed female. She reports waking up on 5/17/2022 with severe C spine pain/L chest pain, L arm pain.  She visited the ED twice in several day period. Cardiac was cleared and C spine CT scan performed. She was given oral steroids. She reports her pain has resolved and she has been doing well for about 2 weeks now, even cutting her yard last night without exacerbation of pain.  She is on gabapentin.  She reports not being able to move the first 2 weeks of her exacerbation.  -GJ      Previous treatment for THIS PROBLEM Medication  steroids,  -GJ      Patient/Caregiver Goals Relieve pain;Know what to do to help the symptoms  -GJ      Hand Dominance right-handed  -GJ      What clinical tests have you had for this problem? CT scan  -GJ      Results of Clinical Tests see epic  -GJ      Are you or can you be  pregnant No  -GJ              Pain     Pain Location Neck  -GJ      What Performance Factors Make the Current Problem(s) WORSE? nothing now  -GJ              Fall Risk Assessment    Any falls in the past year: No  -GJ              Daily Activities    Primary Language English  -GJ      Are you able to read Yes  -GJ      Are you able to write Yes  -GJ      Teaching needs identified Home Exercise Program;Management of Condition  -GJ      Barriers to learning None  -GJ      Pt Participated in POC and Goals Yes  -GJ            User Key  (r) = Recorded By, (t) = Taken By, (c) = Cosigned By    Initials Name Provider Type    GJ Kevin Cordova, PT Physical Therapist                 PT Ortho     Row Name 07/07/22 1500       Posture/Observations    Alignment Options Forward head;Rounded shoulders;Thoracic kyphosis  -GJ    Forward Head Mild;Moderate  -GJ    Thoracic Kyphosis Mild;Moderate;Increased  -GJ    Rounded Shoulders Bilateral:;Moderate  -GJ       Quarter Clearing    Quarter Clearing Upper Quarter Clearing  -GJ       DTR- Upper Quarter Clearing    Biceps (C5/6) Bilateral:;2- Normal response  -GJ    Brachioradialis (C6) Bilateral:;2- Normal response  -GJ    Triceps (C7) Bilateral:;2- Normal response  -GJ       Myotomal Screen- Upper Quarter Clearing    Shoulder flexion (C5) Bilateral:;4+ (Good +)  -GJ    Elbow flexion/wrist extension (C6) Bilateral:;5 (Normal)  -GJ    Elbow extension/wrist flexion (C7) Bilateral:;5 (Normal)  -GJ    Finger flexion/ (C8) Bilateral:;5 (Normal)  -GJ    Finger abduction (T1) Bilateral:;5 (Normal)  -GJ       Cervical/Shoulder ROM Screen    Cervical flexion Normal  -GJ    Cervical extension Normal  -GJ    Cervical lateral flexion Normal  -GJ    Cervical rotation Normal  -GJ       Special Tests/Palpation    Special Tests/Palpation Cervical/Thoracic  -GJ       Cervical Palpation    Cervical Palpation- Location? Levator scapula;Upper traps  -GJ    Levator Scapula Bilateral:;Tender;Guarded/taut   -GJ    Upper Traps Bilateral:;Tender;Guarded/taut  -GJ       General ROM    GENERAL ROM COMMENTS bilateral shoulder, elbow, wrist AROM grossly equal and WFL  -GJ       Flexibility    Flexibility Tested? Upper Extremity  -GJ       Upper Extremity Flexibility    Suboccipitals Bilateral:;Mildly limited;Moderately limited  -GJ    Upper Trapezius Bilateral:;Moderately limited  -GJ    Levator Scapula Bilateral:;Mildly limited;Moderately limited  -GJ    Pect Minor Bilateral:;Mildly limited;Moderately limited  -GJ    Pect Major Bilateral:;Mildly limited;Moderately limited  -GJ          User Key  (r) = Recorded By, (t) = Taken By, (c) = Cosigned By    Initials Name Provider Type    Kevin Trevino, PT Physical Therapist                            Therapy Education  Education Details: discussed dx, px, poc, discussed anatomy of the c spine  and physiology of healing, discussed realistic expectations and time frames for therapy. discussed natural course of the disease of c spine pain, reality it will likely return and what to do if this should happen. Encouraged activity and mobility, encouraged pt consider regular massage. Gave her my card and encoruaged her to call with any questions/concerns.  Given: HEP, Symptoms/condition management, Pain management, Posture/body mechanics, Mobility training  Program: New  How Provided: Verbal, Demonstration, Written  Provided to: Patient  Level of Understanding: Teach back education performed, Verbalized, Demonstrated      PT OP Goals     Row Name 07/07/22 1400          PT Short Term Goals    STG Date to Achieve 08/12/22  -GJ     STG 1 --  -GJ     STG 1 Progress --  -GJ     STG 2 pt. to be educated in/verbalize understanding of the importance of posture/ergonomics in association with their condition to facilitate self management of their condition  -GJ     STG 2 Progress New  -GJ            Long Term Goals    LTG Date to Achieve 10/14/22  -GJ     LTG 1 pt. to be I with advanced HEP  to facilitate self management of their condition  -GJ     LTG 1 Progress New  -GJ     LTG 2 --  -GJ     LTG 2 Progress --  -GJ            Time Calculation    PT Goal Re-Cert Due Date 10/14/22  -           User Key  (r) = Recorded By, (t) = Taken By, (c) = Cosigned By    Initials Name Provider Type    Kevin Trevino, PT Physical Therapist                 PT Assessment/Plan     Row Name 07/07/22 1543          PT Assessment    Impairments Impaired flexibility;Posture;Poor body mechanics  -     Assessment Comments Ms. Amos is a 73 y/o R handed female. She reports waking up on 5/17/2022 with severe C spine pain/L chest pain, L arm pain.  She visited the ED twice in several day period. Cardiac was cleared and C spine CT scan performed. She was given oral steroids. She reports her pain has resolved and she has been doing well for about 2 weeks now, even cutting her yard last night without exacerbation of pain.  She is on gabapentin.  She reports not being able to move the first 2 weeks of her exacerbation.   Ms. Amos demonstrates mild to moderate FHP, rounded shoulder posture. She demonstrates C spine AROM in all planes WFL, not painful. She demonstrates normal upper quarter myotomes. She does demonstrate guarded/taut bilateral UT tissues.  She reports 0% on he NDI (scored 0-100, 0 represents no perceived disability). We reviewed natural course of the disease of c spine pain.  We discussed activity modifications and benefits of mobility. Secondary to not demonstrating any overt impairments, we decided to hold on formal physical therapy at this time. I encouraged her to consider regular massages and offered her several names of massage therapist. I encouraged her to call with any questions/concerns or if she feels like she needs to return. Ms. Amos will practice independent management of her condition at this time.  -STEPHANIE     Please refer to paper survey for additional self-reported information Yes  -STEPHANIE      Rehab Potential Excellent  -GJ     Patient/caregiver participated in establishment of treatment plan and goals Yes  -GJ     Patient would benefit from skilled therapy intervention No  -GJ            PT Plan    PT Frequency One time visit  -GJ     Predicted Duration of Therapy Intervention (PT) 1  -GJ     PT Plan Comments pt will practice indepenent management of her condition and call with any questions/concerns or if she experiences an exacerbation and needs to return  -GJ           User Key  (r) = Recorded By, (t) = Taken By, (c) = Cosigned By    Initials Name Provider Type    Kevin Trevino PT Physical Therapist                   OP Exercises     Row Name 07/07/22 1555             Total Minutes    92549 - PT Therapeutic Activity Minutes 10  education  -GJ            User Key  (r) = Recorded By, (t) = Taken By, (c) = Cosigned By    Initials Name Provider Type    Kevin Trevino, PT Physical Therapist                              Outcome Measure Options: Neck Disability Index (NDI)  Neck Disability Index  Section 1 - Pain Intensity: I have no pain at the moment.  Section 2 - Personal Care: I can look after myself normally without causing extra pain.  Section 3 - Lifting: I can lift heavy weights without extra pain  Section 4 - Work: I can do as much work as I want.  Section 5 - Headaches: I have no headaches at all.  Section 6 - Concentration: I can concentrate fully when I want to without difficulty.  Section 7 - Sleeping: I have no trouble sleeping.  Section 8 - Driving: I can drive my car without neck pain  Section 9 - Reading: I can read as much as I want with no neck pain.  Section 10 - Recreation: I am able to engage in all recreational activities with no pain in my neck at all.  Neck Disability Index Score: 0  Neck Disability Index Comments: 0%      Time Calculation:     Start Time: 1400  Stop Time: 1445  Time Calculation (min): 45 min  Timed Charges  57454 - PT Therapeutic Activity Minutes: 10  (education)  Total Minutes  Timed Charges Total Minutes: 10   Total Minutes: 10     Therapy Charges for Today     Code Description Service Date Service Provider Modifiers Qty    83944247688 HC PT THERAPEUTIC ACT EA 15 MIN 7/7/2022 Kevin Cordova, PT GP 1    97383805775 HC PT EVAL LOW COMPLEXITY 1 7/7/2022 Kevin Cordova, PT GP 1          PT G-Codes  Outcome Measure Options: Neck Disability Index (NDI)  Neck Disability Index Score: 0         Kevin Cordova, PT  7/7/2022

## 2022-07-11 ENCOUNTER — LAB (OUTPATIENT)
Dept: LAB | Facility: HOSPITAL | Age: 72
End: 2022-07-11

## 2022-07-11 ENCOUNTER — OFFICE VISIT (OUTPATIENT)
Dept: ONCOLOGY | Facility: CLINIC | Age: 72
End: 2022-07-11

## 2022-07-11 VITALS
WEIGHT: 185.2 LBS | BODY MASS INDEX: 29.77 KG/M2 | SYSTOLIC BLOOD PRESSURE: 131 MMHG | DIASTOLIC BLOOD PRESSURE: 84 MMHG | HEIGHT: 66 IN | TEMPERATURE: 98 F | OXYGEN SATURATION: 98 % | RESPIRATION RATE: 16 BRPM | HEART RATE: 77 BPM

## 2022-07-11 DIAGNOSIS — Z85.3 HISTORY OF BREAST CANCER: ICD-10-CM

## 2022-07-11 DIAGNOSIS — Z85.3 HISTORY OF BREAST CANCER: Primary | ICD-10-CM

## 2022-07-11 LAB
BASOPHILS # BLD AUTO: 0.06 10*3/MM3 (ref 0–0.2)
BASOPHILS NFR BLD AUTO: 0.8 % (ref 0–1.5)
DEPRECATED RDW RBC AUTO: 47.4 FL (ref 37–54)
EOSINOPHIL # BLD AUTO: 0.14 10*3/MM3 (ref 0–0.4)
EOSINOPHIL NFR BLD AUTO: 1.9 % (ref 0.3–6.2)
ERYTHROCYTE [DISTWIDTH] IN BLOOD BY AUTOMATED COUNT: 14.4 % (ref 12.3–15.4)
HCT VFR BLD AUTO: 42.4 % (ref 34–46.6)
HGB BLD-MCNC: 14 G/DL (ref 12–15.9)
IMM GRANULOCYTES # BLD AUTO: 0.03 10*3/MM3 (ref 0–0.05)
IMM GRANULOCYTES NFR BLD AUTO: 0.4 % (ref 0–0.5)
LYMPHOCYTES # BLD AUTO: 2.29 10*3/MM3 (ref 0.7–3.1)
LYMPHOCYTES NFR BLD AUTO: 30.3 % (ref 19.6–45.3)
MCH RBC QN AUTO: 29.7 PG (ref 26.6–33)
MCHC RBC AUTO-ENTMCNC: 33 G/DL (ref 31.5–35.7)
MCV RBC AUTO: 89.8 FL (ref 79–97)
MONOCYTES # BLD AUTO: 0.54 10*3/MM3 (ref 0.1–0.9)
MONOCYTES NFR BLD AUTO: 7.2 % (ref 5–12)
NEUTROPHILS NFR BLD AUTO: 4.49 10*3/MM3 (ref 1.7–7)
NEUTROPHILS NFR BLD AUTO: 59.4 % (ref 42.7–76)
NRBC BLD AUTO-RTO: 0 /100 WBC (ref 0–0.2)
PLATELET # BLD AUTO: 192 10*3/MM3 (ref 140–450)
PMV BLD AUTO: 11.5 FL (ref 6–12)
RBC # BLD AUTO: 4.72 10*6/MM3 (ref 3.77–5.28)
WBC NRBC COR # BLD: 7.55 10*3/MM3 (ref 3.4–10.8)

## 2022-07-11 PROCEDURE — 36415 COLL VENOUS BLD VENIPUNCTURE: CPT

## 2022-07-11 PROCEDURE — 99215 OFFICE O/P EST HI 40 MIN: CPT | Performed by: INTERNAL MEDICINE

## 2022-07-11 PROCEDURE — 85025 COMPLETE CBC W/AUTO DIFF WBC: CPT

## 2022-07-11 RX ORDER — CIPROFLOXACIN 250 MG/1
TABLET, FILM COATED ORAL
COMMUNITY
Start: 2022-07-08 | End: 2023-01-09

## 2022-07-11 RX ORDER — LOVASTATIN 40 MG/1
40 TABLET ORAL DAILY
COMMUNITY
Start: 2022-02-18 | End: 2022-07-11 | Stop reason: SDUPTHER

## 2022-07-11 RX ORDER — BUPROPION HYDROCHLORIDE 300 MG/1
300 TABLET ORAL DAILY
COMMUNITY

## 2022-07-11 RX ORDER — BACLOFEN 10 MG/1
TABLET ORAL
COMMUNITY
Start: 2022-06-12 | End: 2022-12-21

## 2022-07-11 RX ORDER — GABAPENTIN 100 MG/1
100 CAPSULE ORAL 3 TIMES DAILY
COMMUNITY
Start: 2022-05-23 | End: 2023-05-23

## 2022-07-11 RX ORDER — DULOXETIN HYDROCHLORIDE 60 MG/1
60 CAPSULE, DELAYED RELEASE ORAL DAILY
COMMUNITY
Start: 2022-02-18

## 2022-07-11 RX ORDER — L-MEFOL/A-CYST/MEB12/ALGAL OIL 6-600-2 MG
1 TABLET ORAL DAILY
COMMUNITY
Start: 2022-07-08 | End: 2022-07-11 | Stop reason: SDDI

## 2022-07-14 DIAGNOSIS — Z98.890 STATUS POST TRANSVERSE RECTUS ABDOMINIS MUSCLE (TRAM) FLAP BREAST RECONSTRUCTION: ICD-10-CM

## 2022-07-14 DIAGNOSIS — Z85.3 HISTORY OF BREAST CANCER: Primary | ICD-10-CM

## 2022-07-14 NOTE — PROGRESS NOTES
Patient wants a second opinion regarding her left TRAM flap.  Discussed with Dr. Alex.  He advised sending her to Dr. Mathew for a second opinion and I have made this referral today.  Discussed with the patient and she agrees.

## 2022-07-20 ENCOUNTER — TELEPHONE (OUTPATIENT)
Dept: ONCOLOGY | Facility: CLINIC | Age: 72
End: 2022-07-20

## 2022-07-20 NOTE — TELEPHONE ENCOUNTER
----- Message from Jeremi Combs MD sent at 7/14/2022  1:26 PM EDT -----  Regarding: Dr. Mathew  Citlali,    Please refer her to Dr. Mathew for a 2nd opinion regarding her left TRAM flap.     Dr. Alex did her surgery. Please tell his office that I spoke with Dr. Alex and he said it's OK for her to see Dr. Mathew for a second opinion.     Thanks, HECTOR

## 2022-07-20 NOTE — TELEPHONE ENCOUNTER
CALLED PT WITH HER APPT DATE AND TIME AT DR ALEXANDRA OFFICE FOR HER SECOND OPINION - 8/1/22 AT 1:45PM THEY HAVE EMAILED THE PT WITH HER APPT DATE AND TIME

## 2022-07-26 ENCOUNTER — HOSPITAL ENCOUNTER (OUTPATIENT)
Dept: MAMMOGRAPHY | Facility: HOSPITAL | Age: 72
Discharge: HOME | End: 2022-07-26
Admitting: INTERNAL MEDICINE

## 2022-07-26 DIAGNOSIS — Z12.31 VISIT FOR SCREENING MAMMOGRAM: ICD-10-CM

## 2022-07-26 PROCEDURE — 77063 BREAST TOMOSYNTHESIS BI: CPT

## 2022-07-26 PROCEDURE — 77067 SCR MAMMO BI INCL CAD: CPT

## 2022-07-27 ENCOUNTER — TELEPHONE (OUTPATIENT)
Dept: ONCOLOGY | Facility: CLINIC | Age: 72
End: 2022-07-27

## 2022-07-27 DIAGNOSIS — Z85.3 HISTORY OF BREAST CANCER: Primary | ICD-10-CM

## 2022-07-27 NOTE — TELEPHONE ENCOUNTER
Returned call to patient who is reporting that she has seen her Mammogram results on my chart and wants to discuss with Dr. Combs.  This was her second call in this am.  I let her know that Dr. Combs will need to review the results and follow up with her.  He is currently rounding in the hospital and we will get back to her as soon as possible.

## 2022-07-27 NOTE — TELEPHONE ENCOUNTER
Caller: Jennifer Amos    Relationship: Self    Best call back number: [phone]    What is the best time to reach you: ANYTIME     Who are you requesting to speak with (clinical staff, provider,  specific staff member): DR GARDUNO AND HIS NURSE         What was the call regarding: CALLING BACK HAS NOT HEARD BACK YET NEEDING TO DISCUSS THE MAMMOGRAM RESULTS FROM YESTERDAY , WOULD REALLY LIKE IF SOMEONE  CAN CALL BACK TODAY REGARDING THIS.     Do you require a callback: YES

## 2022-07-27 NOTE — TELEPHONE ENCOUNTER
Called pt with her appt for Friday 7/29/22 arrival at 7:30am - she will register and then they will do her mammo and ultrasound

## 2022-07-27 NOTE — TELEPHONE ENCOUNTER
----- Message from Luisana Pereira RN sent at 7/27/2022  2:46 PM EDT -----  Please schedule mammogram with spot compression of right breast  Right breast U/S as well    The pt is willing to go to [facility] or [facility]. She will take the first available and is also anxious

## 2022-07-27 NOTE — NURSING NOTE
Dr. Combs agrees with the mammo recommendation of spot compression ammo and US, both of right breast. Pt informed and V/U. Message sent to scheduling.

## 2022-07-27 NOTE — TELEPHONE ENCOUNTER
Caller: Jennifer Amos    Relationship: Self    Best call back number: [phone]      What was the call regarding: PATIENT CALLED WANTS TO DISCUSS HER MAMMOGRAM RESULTS    Do you require a callback: YES

## 2022-07-27 NOTE — TELEPHONE ENCOUNTER
I spoke to pt and let her know I have reached out to Dr. Combs regarding her Mammogram results. I informed her I would call hr as soon as Dr. Combs reviews results. Pt V/U.

## 2022-07-29 ENCOUNTER — HOSPITAL ENCOUNTER (OUTPATIENT)
Dept: MAMMOGRAPHY | Facility: HOSPITAL | Age: 72
Discharge: HOME | End: 2022-07-29

## 2022-07-29 ENCOUNTER — HOSPITAL ENCOUNTER (OUTPATIENT)
Dept: ULTRASOUND IMAGING | Facility: HOSPITAL | Age: 72
Discharge: HOME | End: 2022-07-29

## 2022-07-29 DIAGNOSIS — Z85.3 HISTORY OF BREAST CANCER: ICD-10-CM

## 2022-07-29 PROCEDURE — 76642 ULTRASOUND BREAST LIMITED: CPT

## 2022-07-29 PROCEDURE — G0279 TOMOSYNTHESIS, MAMMO: HCPCS

## 2022-07-29 PROCEDURE — 77065 DX MAMMO INCL CAD UNI: CPT

## 2022-12-21 ENCOUNTER — HOSPITAL ENCOUNTER (EMERGENCY)
Facility: HOSPITAL | Age: 72
Discharge: HOME | End: 2022-12-21
Attending: EMERGENCY MEDICINE | Admitting: EMERGENCY MEDICINE

## 2022-12-21 ENCOUNTER — APPOINTMENT (OUTPATIENT)
Dept: GENERAL RADIOLOGY | Facility: HOSPITAL | Age: 72
End: 2022-12-21

## 2022-12-21 ENCOUNTER — APPOINTMENT (OUTPATIENT)
Dept: CT IMAGING | Facility: HOSPITAL | Age: 72
End: 2022-12-21

## 2022-12-21 VITALS
TEMPERATURE: 96.7 F | SYSTOLIC BLOOD PRESSURE: 173 MMHG | HEART RATE: 86 BPM | DIASTOLIC BLOOD PRESSURE: 101 MMHG | OXYGEN SATURATION: 96 % | RESPIRATION RATE: 16 BRPM

## 2022-12-21 DIAGNOSIS — S09.90XA INJURY OF HEAD, INITIAL ENCOUNTER: ICD-10-CM

## 2022-12-21 DIAGNOSIS — S16.1XXA ACUTE STRAIN OF NECK MUSCLE, INITIAL ENCOUNTER: ICD-10-CM
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
## 2022-12-21 DIAGNOSIS — R03.0 ELEVATED BLOOD PRESSURE READING: ICD-10-CM

## 2022-12-21 DIAGNOSIS — S20.219A CONTUSION OF CHEST WALL, UNSPECIFIED LATERALITY, INITIAL ENCOUNTER: ICD-10-CM

## 2022-12-21 PROCEDURE — 72125 CT NECK SPINE W/O DYE: CPT

## 2022-12-21 PROCEDURE — 71046 X-RAY EXAM CHEST 2 VIEWS: CPT

## 2022-12-21 PROCEDURE — 99283 EMERGENCY DEPT VISIT LOW MDM: CPT

## 2022-12-21 PROCEDURE — 70450 CT HEAD/BRAIN W/O DYE: CPT

## 2022-12-21 RX ORDER — METHOCARBAMOL 750 MG/1
750 TABLET, FILM COATED ORAL 3 TIMES DAILY PRN
Qty: 15 TABLET | Refills: 0 | Status: SHIPPED | OUTPATIENT
Start: 2022-12-21 | End: 2022-12-22 | Stop reason: SDUPTHER

## 2022-12-22 RX ORDER — DICLOFENAC POTASSIUM 50 MG/1
50 TABLET, FILM COATED ORAL 3 TIMES DAILY
Qty: 15 TABLET | Refills: 0 | Status: SHIPPED | OUTPATIENT
Start: 2022-12-22 | End: 2023-01-09

## 2022-12-22 RX ORDER — METHOCARBAMOL 750 MG/1
750 TABLET, FILM COATED ORAL 3 TIMES DAILY PRN
Qty: 15 TABLET | Refills: 0 | Status: SHIPPED | OUTPATIENT
Start: 2022-12-22 | End: 2023-01-09
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
## 2022-12-22 NOTE — DISCHARGE INSTRUCTIONS
Return to the ER with any pain, should you develop any new symptoms that were not addressed at today's visit, fever, or should you have any further concerns.    Recommend following up with your family doctor above for repeat evaluation in the next 5 to 7 days.
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
## 2023-01-09 ENCOUNTER — TRANSCRIBE ORDERS (OUTPATIENT)
Dept: ADMINISTRATIVE | Facility: HOSPITAL | Age: 73
End: 2023-01-09
Payer: COMMERCIAL

## 2023-01-09 ENCOUNTER — OFFICE VISIT (OUTPATIENT)
Dept: ONCOLOGY | Facility: CLINIC | Age: 73
End: 2023-01-09
Payer: MEDICARE

## 2023-01-09 ENCOUNTER — LAB (OUTPATIENT)
Dept: LAB | Facility: HOSPITAL | Age: 73
End: 2023-01-09
Payer: MEDICARE

## 2023-01-09 VITALS
RESPIRATION RATE: 18 BRPM | HEART RATE: 82 BPM | SYSTOLIC BLOOD PRESSURE: 124 MMHG | BODY MASS INDEX: 30.76 KG/M2 | WEIGHT: 191.4 LBS | OXYGEN SATURATION: 97 % | HEIGHT: 66 IN | TEMPERATURE: 97.5 F | DIASTOLIC BLOOD PRESSURE: 74 MMHG

## 2023-01-09 DIAGNOSIS — Z85.3 HISTORY OF BREAST CANCER: Primary | ICD-10-CM

## 2023-01-09 DIAGNOSIS — S20.01XA POSTTRAUMATIC HEMATOMA OF RIGHT BREAST, INITIAL ENCOUNTER: ICD-10-CM

## 2023-01-09 DIAGNOSIS — R52 PAIN: Primary | ICD-10-CM

## 2023-01-09 DIAGNOSIS — Z85.3 HISTORY OF BREAST CANCER: ICD-10-CM
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
## 2023-01-09 LAB
BASOPHILS # BLD AUTO: 0.07 10*3/MM3 (ref 0–0.2)
BASOPHILS NFR BLD AUTO: 0.9 % (ref 0–1.5)
DEPRECATED RDW RBC AUTO: 47.9 FL (ref 37–54)
EOSINOPHIL # BLD AUTO: 0.23 10*3/MM3 (ref 0–0.4)
EOSINOPHIL NFR BLD AUTO: 3 % (ref 0.3–6.2)
ERYTHROCYTE [DISTWIDTH] IN BLOOD BY AUTOMATED COUNT: 14.6 % (ref 12.3–15.4)
HCT VFR BLD AUTO: 42.1 % (ref 34–46.6)
HGB BLD-MCNC: 13.8 G/DL (ref 12–15.9)
IMM GRANULOCYTES # BLD AUTO: 0.06 10*3/MM3 (ref 0–0.05)
IMM GRANULOCYTES NFR BLD AUTO: 0.8 % (ref 0–0.5)
LYMPHOCYTES # BLD AUTO: 2.63 10*3/MM3 (ref 0.7–3.1)
LYMPHOCYTES NFR BLD AUTO: 33.9 % (ref 19.6–45.3)
MCH RBC QN AUTO: 29.6 PG (ref 26.6–33)
MCHC RBC AUTO-ENTMCNC: 32.8 G/DL (ref 31.5–35.7)
MCV RBC AUTO: 90.1 FL (ref 79–97)
MONOCYTES # BLD AUTO: 0.57 10*3/MM3 (ref 0.1–0.9)
MONOCYTES NFR BLD AUTO: 7.3 % (ref 5–12)
NEUTROPHILS NFR BLD AUTO: 4.2 10*3/MM3 (ref 1.7–7)
NEUTROPHILS NFR BLD AUTO: 54.1 % (ref 42.7–76)
NRBC BLD AUTO-RTO: 0 /100 WBC (ref 0–0.2)
PLATELET # BLD AUTO: 202 10*3/MM3 (ref 140–450)
PMV BLD AUTO: 11.2 FL (ref 6–12)
RBC # BLD AUTO: 4.67 10*6/MM3 (ref 3.77–5.28)
WBC NRBC COR # BLD: 7.76 10*3/MM3 (ref 3.4–10.8)

## 2023-01-09 PROCEDURE — 99214 OFFICE O/P EST MOD 30 MIN: CPT | Performed by: INTERNAL MEDICINE

## 2023-01-09 PROCEDURE — 36415 COLL VENOUS BLD VENIPUNCTURE: CPT

## 2023-01-09 PROCEDURE — 85025 COMPLETE CBC W/AUTO DIFF WBC: CPT

## 2023-01-09 RX ORDER — NITROFURANTOIN MACROCRYSTALS 50 MG/1
CAPSULE ORAL
COMMUNITY
Start: 2022-10-11

## 2023-01-13 ENCOUNTER — DOCUMENTATION (OUTPATIENT)
Dept: PHYSICAL THERAPY | Facility: HOSPITAL | Age: 73
End: 2023-01-13
Payer: COMMERCIAL

## 2023-01-13 ENCOUNTER — HOSPITAL ENCOUNTER (OUTPATIENT)
Dept: CT IMAGING | Facility: HOSPITAL | Age: 73
Discharge: HOME | End: 2023-01-13
Admitting: EMERGENCY MEDICINE
Payer: COMMERCIAL
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
## 2023-01-13 DIAGNOSIS — M54.2 CERVICALGIA: Primary | ICD-10-CM

## 2023-01-13 DIAGNOSIS — R52 PAIN: ICD-10-CM

## 2023-01-13 DIAGNOSIS — Z74.09 IMPAIRED MOBILITY: ICD-10-CM

## 2023-01-13 PROCEDURE — 73700 CT LOWER EXTREMITY W/O DYE: CPT

## 2023-01-13 NOTE — THERAPY DISCHARGE NOTE
Outpatient Physical Therapy Discharge Summary         Patient Name: Jennifer Amos  : 1950  MRN: [id number]    Today's Date: 2023    Visit Dx:    ICD-10-CM ICD-9-CM   1. Cervicalgia  M54.2 723.1   2. Impaired mobility  Z74.09 799.89        PT OP Goals     Row Name 23 1100          PT Short Term Goals    STG Date to Achieve 22  -GJ     STG 2 pt. to be educated in/verbalize understanding of the importance of posture/ergonomics in association with their condition to facilitate self management of their condition  -GJ     STG 2 Progress Met  -GJ        Long Term Goals    LTG Date to Achieve 10/14/22  -GJ     LTG 1 pt. to be I with advanced HEP to facilitate self management of their condition  -GJ     LTG 1 Progress Not Met  -GJ           User Key  (r) = Recorded By, (t) = Taken By, (c) = Cosigned By    Initials Name Provider Type    Kevin Trevino, PT Physical Therapist                OP PT Discharge Summary  Date of Discharge: 23  Reason for Discharge: Patient/Caregiver request  Outcomes Achieved: Other  Discharge Destination: Home without follow-up  Discharge Instructions/Additional Comments: Ms. Amos attended 1 session of physical therapy on 2022 for her c spine pian. At the time of the initial evaluation, she wished to practice self management of her condition. She was encouraged to call with any questions. Ms. Amos is discharged from outpatient physical therapy to an independent program.      Time Calculation:                    Kevin Cordova, PT  2023

## 2023-02-03 ENCOUNTER — TRANSCRIBE ORDERS (OUTPATIENT)
Dept: ADMINISTRATIVE | Facility: HOSPITAL | Age: 73
End: 2023-02-03
Payer: COMMERCIAL

## 2023-02-03 DIAGNOSIS — Z12.31 VISIT FOR SCREENING MAMMOGRAM: Primary | ICD-10-CM

## 2023-03-01 ENCOUNTER — APPOINTMENT (OUTPATIENT)
Dept: CT IMAGING | Facility: HOSPITAL | Age: 73
End: 2023-03-01
Payer: MEDICARE

## 2023-03-01 ENCOUNTER — HOSPITAL ENCOUNTER (EMERGENCY)
Facility: HOSPITAL | Age: 73
Discharge: HOME | End: 2023-03-01
Attending: EMERGENCY MEDICINE | Admitting: EMERGENCY MEDICINE
Payer: MEDICARE

## 2023-03-01 VITALS
RESPIRATION RATE: 16 BRPM | WEIGHT: 189 LBS | HEIGHT: 66 IN | OXYGEN SATURATION: 99 % | TEMPERATURE: 97.9 F | HEART RATE: 101 BPM | SYSTOLIC BLOOD PRESSURE: 125 MMHG | DIASTOLIC BLOOD PRESSURE: 79 MMHG | BODY MASS INDEX: 30.37 KG/M2

## 2023-03-01 DIAGNOSIS — M54.50 ACUTE BILATERAL LOW BACK PAIN WITHOUT SCIATICA: Primary | ICD-10-CM

## 2023-03-01 PROCEDURE — 72131 CT LUMBAR SPINE W/O DYE: CPT

## 2023-03-01 PROCEDURE — 63710000001 PREDNISONE PER 1 MG: Performed by: PHYSICIAN ASSISTANT

## 2023-03-01 PROCEDURE — 99283 EMERGENCY DEPT VISIT LOW MDM: CPT

## 2023-03-01 RX ORDER — METHOCARBAMOL 500 MG/1
1000 TABLET, FILM COATED ORAL 4 TIMES DAILY
Qty: 40 TABLET | Refills: 0 | Status: SHIPPED | OUTPATIENT
Start: 2023-03-01

## 2023-03-01 RX ORDER — PREDNISONE 20 MG/1
60 TABLET ORAL DAILY
Qty: 15 TABLET | Refills: 0 | Status: SHIPPED | OUTPATIENT
Start: 2023-03-01 | End: 2023-03-06

## 2023-03-01 RX ORDER — PREDNISONE 20 MG/1
TABLET ORAL
Status: ACTIVE
Start: 2023-03-01 | End: 2023-03-01

## 2023-03-01 RX ORDER — PREDNISONE 20 MG/1
60 TABLET ORAL ONCE
Status: COMPLETED | OUTPATIENT
Start: 2023-03-01 | End: 2023-03-01

## 2023-03-01 RX ADMIN — PREDNISONE 60 MG: 20 TABLET ORAL at 03:30

## 2023-03-01 NOTE — ED PROVIDER NOTES
EMERGENCY DEPARTMENT ENCOUNTER    Room Number:  32/32  Date of encounter:  3/1/2023  PCP: Berenice Padron MD  Patient Care Team:  Berenice Padron MD as PCP - General (Internal Medicine)  Jeremi Combs MD as Consulting Physician (Hematology and Oncology)  Alpa Pineda MD as Referring Physician (Breast Surgery)  Briseyda Simpson MD as Consulting Physician (Gastroenterology)   Independent Historians: Patient    HPI:  Chief Complaint: Back pain  A complete HPI/ROS/PMH/PSH/SH/FH are unobtainable due to: N/A    Chronic or social conditions impacting patient care (social determinants of health): None    Context: Jennifer Amos is a 72 y.o. female with past medical history of osteoporosis, osteoarthritis, sarcoidosis, HLD, GERD, and hypothyroidism who arrives to the ED with complaint of low back pain.  Patient states that she injured her back about 1 week ago lifting a heavy object.  Patient states that initially was getting better but then she started having worsening pain after 2 to 3 days.  Patient denies any radiation of pain down the legs, no loss of bladder or bowel control, no urinary retention, no saddle paresthesias, no fever, chills, or UTI symptoms.  Patient saw her PCP yesterday and was referred to the [facility] [place] for further evaluation.  Patient has not taken any over-the-counter medicines, no Tylenol or ibuprofen for pain, has been using Lidoderm patches with minimal relief.  Patient states that she wants to avoid any controlled substances due to history of addiction.    Review of prior external notes (non-ED): None    Review of prior external test results outside of this encounter: None    PAST MEDICAL HISTORY  Active Ambulatory Problems     Diagnosis Date Noted   • OA (osteoarthritis) of knee 03/24/2016   • Senile osteoporosis 05/06/2016   • History of breast cancer 05/18/2016   • Neuritis of foot 07/13/2016   • Primary osteoarthritis of right foot 07/13/2016   •  Paresthesia of right foot 07/13/2016   • Mucous cyst of toe 07/13/2016   • Osteopenia determined by x-ray 07/13/2016   • Bunionette 08/29/2016   • Urinary tract infection 10/21/2016   • Osteoporosis 05/02/2017   • Osteopenia 05/11/2017   • Bunionette of right foot 08/16/2017   • Arthritis of foot 08/16/2017   • Closed nondisplaced fracture of proximal phalanx of lesser toe of left foot 08/16/2017   • Other fatigue 10/02/2017   • Microcytic anemia 09/12/2018   • Hematochezia 09/12/2018   • Iron deficiency anemia 10/23/2018   • Chest pain 05/17/2022   • Status post transverse rectus abdominis muscle (TRAM) flap breast reconstruction 07/14/2022   • Posttraumatic hematoma of right breast 01/09/2023     Resolved Ambulatory Problems     Diagnosis Date Noted   • No Resolved Ambulatory Problems     Past Medical History:   Diagnosis Date   • Acid reflux    • Arthritis    • Breast cancer (HCC)    • Depression    • Disease of thyroid gland    • Fat necrosis    • H/O Left calf vein thrombosis 10/2011   • History of anemia    • History of colon polyps    • History of fall    • History of osteopenia    • History of transfusion    • Hypercholesteremia    • Hypothyroidism    • Incisional hernia    • Joint pain    • Meniscus tear    • Sarcoidosis        The patient has a COVID HM Topic on their chart, and they are fully vaccinated.    PAST SURGICAL HISTORY  Past Surgical History:   Procedure Laterality Date   • BREAST BIOPSY     • BREAST SURGERY  2013    BILATERAL MASTECTOMY WITH LEFT BREAST AXILLARY LYMPH NODE DISSECTION   • COLONOSCOPY  2013   • COLONOSCOPY N/A 11/5/2018    Procedure: COLONOSCOPY to cecum  and TI:  biopsy polypectomies;  Surgeon: Briseyda Simpson MD;  Location: [facility] ENDOSCOPY;  Service: Gastroenterology   • ENDOSCOPY N/A 11/5/2018    Procedure: ESOPHAGOGASTRODUODENOSCOPY with biospsies and polypectomy;  Surgeon: Briseyda Simpson MD;  Location: [facility] ENDOSCOPY;  Service: Gastroenterology   • HERNIA REPAIR N/A  08/07/2015    ventral hernia repair by Dr. Brown   • HYSTERECTOMY  1990    WITH BILATERA SALPINGO OOPHERECTOMY   • KNEE ARTHROPLASTY Right 2011   • KNEE ARTHROPLASTY Left 03/25/2016    Partial replacement   • KNEE ARTHROPLASTY UNICOMPARTMENTAL Left 3/25/2016    Procedure: LT KNEE ARTHROPLASTY UNICOMPARTMENTAL;  Surgeon: Freddy Mast MD;  Location: [facility] OR;  Service:    • MASTECTOMY     • ORIF FINGER FRACTURE Right 2014    HAND LITTLE FINGER   • RECONSTRUCTION BREAST W/ TRAM FLAP Bilateral 2014    Dr. Alex   • RHINOPLASTY     • TONSILLECTOMY  1953   • WISDOM TOOTH EXTRACTION  1960'S    FOUR         FAMILY HISTORY  Family History   Problem Relation Age of Onset   • Breast cancer Paternal Aunt 55   • Deep vein thrombosis Other    • Heart disease Other    • Hypertension Other    • Diabetes Other    • Cancer Other    • Kidney disease Other    • Glaucoma Other    • Heart failure Mother    • Hypertension Mother    • Breast cancer Maternal Aunt 65   • Breast cancer Cousin    • Stroke Father    • Alcohol abuse Father    • Thyroid disease Sister    • Depression Sister          SOCIAL HISTORY  Social History     Socioeconomic History   • Marital status:      Spouse name: Irving   • Number of children: 3   Tobacco Use   • Smoking status: Never   • Smokeless tobacco: Never   Substance and Sexual Activity   • Alcohol use: No   • Drug use: No   • Sexual activity: Defer         ALLERGIES  Hydrocodone, Oxycodone-acetaminophen, Percocet [oxycodone-acetaminophen], Sulfa antibiotics, and Penicillins        REVIEW OF SYSTEMS  Review of Systems     All systems reviewed and negative except for those discussed in HPI.       PHYSICAL EXAM    I have reviewed the triage vital signs and nursing notes.    ED Triage Vitals   Temp Pulse Resp BP SpO2   -- -- -- -- --      Temp src Heart Rate Source Patient Position BP Location FiO2 (%)   -- -- -- -- --       Physical Exam    GENERAL: alert and alert and oriented x4, not  distressed  HENT: normocephalic, atraumatic, moist mucous membranes  EYES: no scleral icterus, PERRL, EOMI  CV: regular rhythm, regular rate, intact distal pulses  RESPIRATORY: normal effort, CTAB  ABDOMEN: soft/nontender, no rebound or guarding, no pulsatile masses  MUSCULOSKELETAL: no deformity, decreased ROM secondary to pain in the back, mild paraspinal soft tissue tenderness in the lumbar spine  NEURO: alert, moves all extremities, patellar and Achilles DTRs equal bilaterally, negative straight leg raises, normal strength of great toes bilaterally, follows commands  SKIN: warm, dry, no rash, no rashes  Psych: Appropriate mood and affect      Nursing notes and vital signs reviewed      LAB RESULTS  Recent Results (from the past 24 hour(s))   HEMOGLOBIN A1C    Collection Time: 02/28/23  2:32 PM    Specimen type and source: Whole Blood, Blood   Result Value Ref Range    Hemoglobin A1C 5.8 (H) 4.3 - 5.6 %    Mean Bld Glu Estim. 118 mg/dL   T4, FREE    Collection Time: 02/28/23  2:32 PM    Specimen type and source: Serum,    Result Value Ref Range    Free T4 1.06 0.93 - 1.70 ng/dL       Ordered the above labs and independently reviewed and interpreted the results by me.        RADIOLOGY  CT Lumbar Spine Without Contrast    Result Date: 3/1/2023  Patient: MARSHA PAUL  Time Out: 04:55 Exam(s): CT L SPINE EXAM:   CT Lumbar Spine Without Intravenous Contrast CLINICAL HISTORY:    Reason for exam: Low back pain. TECHNIQUE:   Axial computed tomography images of the lumbar spine without intravenous contrast.  CTDI is 19.29 mGy and DLP is 539.9 mGy-cm.  This CT exam was performed according to the principle of ALARA (As Low As Reasonably Achievable) by using one or more of the following dose reduction techniques: automated exposure control, adjustment of the mA and or kV according to patient size, and or use of iterative reconstruction technique. COMPARISON:   8 31 2017 MRI FINDINGS:   Vertebrae:  Unremarkable.  No acute  fracture.  No spinal listhesis.  Advanced facet arthropathy at L4-L5 and L5-S1.   Discs spinal canal neural foramina:  No acute findings.  No spinal canal stenosis.   Soft tissues:  Unremarkable. IMPRESSION:     No acute fractures or spondylolisthesis of the lumbar spine.  No significant spinal canal narrowing.    Electronically signed by Aris Lane M.D. on 03-01-23 at 0455      I ordered the above noted radiological studies.  These were independently interpreted and reviewed by me.  See dictation for official radiology interpretation.      PROCEDURES    Procedures      MEDICATIONS GIVEN IN ER    Medications   predniSONE (DELTASONE) tablet 60 mg (60 mg Oral Given 3/1/23 0330)         PROGRESS, DATA ANALYSIS, CONSULTS, AND MEDICAL DECISION MAKING    All labs have been independently reviewed by me.  All radiology studies have been reviewed by me and discussed with radiologist dictating the report.   EKG's independently viewed and interpreted by me.  Discussion below represents my analysis of pertinent findings related to patient's condition, differential diagnosis, treatment plan and final disposition.    DDx:  Includes, but is not limited to acute lumbar strain, lumbar sprain, lumbar compression fracture, sciatica    ED Course as of 03/01/23 0528   Wed Mar 01, 2023   0519 Patient rechecked, resting comfortably in bed, pain improved.  Discussed radiology results, diagnosis, and treatment plan.  Patient expressed understanding and agrees with plan. [MASTER]   0519 Patient was able to dress on her own and is ambulating about the department with no difficulty. [MASTER]      ED Course User Index  [MASTER] Chun Umana, PA       MDM: Patient CT scan shows no acute abnormality and her physical exam is normal and there is no suggestion of an acute or neurologic process in her back.  We will treat with steroids and muscle relaxers and short term follow-up with back specialist.    PPE:  The patient wore a mask and I wore a mask  and all appropriate PPE throughout the entire patient encounter.      AS OF 05:28 EST VITALS:    BP - 125/79  HR - 101  TEMP - 97.9 °F (36.6 °C) (Tympanic)  O2 SATS - 99%      DIAGNOSIS  Final diagnoses:   Acute bilateral low back pain without sciatica         DISPOSITION  DISCHARGE    Patient discharged in stable condition.    Reviewed implications of results, diagnosis, meds, responsibility to follow up, warning signs and symptoms of possible worsening, potential complications and reasons to return to ER.    Patient/Family voiced understanding of above instructions.    Discussed plan for discharge, as there is no emergent indication for admission. Patient referred to primary care provider for BP management due to today's BP. Pt/family is agreeable and understands need for follow up and repeat testing.  Pt is aware that discharge does not mean that nothing is wrong but it indicates no emergency is present that requires admission and they must continue care with follow-up as given below or physician of their choice.     FOLLOW-UP  Berenice Padron MD  [address]  [address]  [place] [address]  [phone]    Schedule an appointment as soon as possible for a visit            Medication List      New Prescriptions    methocarbamol 500 MG tablet  Commonly known as: ROBAXIN  Take 2 tablets by mouth 4 (Four) Times a Day.     predniSONE 20 MG tablet  Commonly known as: DELTASONE  Take 3 tablets by mouth Daily for 5 days.           Where to Get Your Medications      These medications were sent to [id number] - [place], [place] - [address] AT [facility] - [phone]  - [phone]   [address], [facility] [address]    Phone: [phone]   · methocarbamol 500 MG tablet  · predniSONE 20 MG tablet           Note Disclaimer: At [facility], we believe that sharing information builds trust and better relationships. You are receiving this note because  you recently visited [facility]. It is possible you will see health information before a provider has talked with you about it. This kind of information can be easy to misunderstand. To help you fully understand what it means for your health, we urge you to discuss this note with your provider.     Chun Umana PA  03/01/23 0528

## 2023-03-01 NOTE — ED NOTES
Pt here for c/o low back pain - states lifted heavy object 1 week ago, and has had back pain since then, but it was getting better until 2 nights ago.  Pt denies incontinence, denies radiation down leg.  Pt wearing face mask, hospital personnel in appropriate ppe.

## 2023-03-01 NOTE — DISCHARGE INSTRUCTIONS
Home, rest, medicine as directed, home medicine as prescribed, follow up with PCP for recheck. Return to care with further concerns.

## 2023-04-26 ENCOUNTER — OFFICE VISIT (OUTPATIENT)
Dept: GASTROENTEROLOGY | Facility: CLINIC | Age: 73
End: 2023-04-26
Payer: MEDICARE

## 2023-04-26 VITALS
BODY MASS INDEX: 31.18 KG/M2 | DIASTOLIC BLOOD PRESSURE: 86 MMHG | HEIGHT: 66 IN | WEIGHT: 194 LBS | SYSTOLIC BLOOD PRESSURE: 133 MMHG | TEMPERATURE: 96.4 F | HEART RATE: 81 BPM

## 2023-04-26 DIAGNOSIS — R15.1 FECAL SMEARING: ICD-10-CM

## 2023-04-26 DIAGNOSIS — Z86.010 PERSONAL HISTORY OF COLONIC POLYPS: ICD-10-CM

## 2023-04-26 DIAGNOSIS — Z86.19 HISTORY OF HELICOBACTER PYLORI INFECTION: ICD-10-CM

## 2023-04-26 DIAGNOSIS — K21.00 GASTROESOPHAGEAL REFLUX DISEASE WITH ESOPHAGITIS WITHOUT HEMORRHAGE: Primary | ICD-10-CM
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
## 2023-07-28 ENCOUNTER — HOSPITAL ENCOUNTER (OUTPATIENT)
Dept: MAMMOGRAPHY | Facility: HOSPITAL | Age: 73
Discharge: HOME | End: 2023-07-28
Admitting: INTERNAL MEDICINE
Payer: MEDICARE

## 2023-07-28 DIAGNOSIS — Z12.31 VISIT FOR SCREENING MAMMOGRAM: ICD-10-CM

## 2023-07-28 PROCEDURE — 77063 BREAST TOMOSYNTHESIS BI: CPT

## 2023-07-28 PROCEDURE — 77067 SCR MAMMO BI INCL CAD: CPT

## 2023-08-03 ENCOUNTER — OFFICE VISIT (OUTPATIENT)
Dept: ONCOLOGY | Facility: CLINIC | Age: 73
End: 2023-08-03
Payer: MEDICARE

## 2023-08-03 ENCOUNTER — LAB (OUTPATIENT)
Dept: LAB | Facility: HOSPITAL | Age: 73
End: 2023-08-03
Payer: MEDICARE

## 2023-08-03 VITALS
SYSTOLIC BLOOD PRESSURE: 122 MMHG | OXYGEN SATURATION: 99 % | DIASTOLIC BLOOD PRESSURE: 80 MMHG | HEART RATE: 72 BPM | HEIGHT: 66 IN | WEIGHT: 180.2 LBS | RESPIRATION RATE: 18 BRPM | TEMPERATURE: 97.9 F | BODY MASS INDEX: 28.96 KG/M2

## 2023-08-03 DIAGNOSIS — Z85.3 HISTORY OF BREAST CANCER: ICD-10-CM

## 2023-08-03 DIAGNOSIS — Z85.3 HISTORY OF BREAST CANCER: Primary | ICD-10-CM

## 2023-08-03 LAB
BASOPHILS # BLD AUTO: 0.05 10*3/MM3 (ref 0–0.2)
BASOPHILS NFR BLD AUTO: 0.8 % (ref 0–1.5)
DEPRECATED RDW RBC AUTO: 47.2 FL (ref 37–54)
EOSINOPHIL # BLD AUTO: 0.22 10*3/MM3 (ref 0–0.4)
EOSINOPHIL NFR BLD AUTO: 3.4 % (ref 0.3–6.2)
ERYTHROCYTE [DISTWIDTH] IN BLOOD BY AUTOMATED COUNT: 14 % (ref 12.3–15.4)
HCT VFR BLD AUTO: 44 % (ref 34–46.6)
HGB BLD-MCNC: 14.5 G/DL (ref 12–15.9)
IMM GRANULOCYTES # BLD AUTO: 0.06 10*3/MM3 (ref 0–0.05)
IMM GRANULOCYTES NFR BLD AUTO: 0.9 % (ref 0–0.5)
LYMPHOCYTES # BLD AUTO: 2.1 10*3/MM3 (ref 0.7–3.1)
LYMPHOCYTES NFR BLD AUTO: 32.4 % (ref 19.6–45.3)
MCH RBC QN AUTO: 30.1 PG (ref 26.6–33)
MCHC RBC AUTO-ENTMCNC: 33 G/DL (ref 31.5–35.7)
MCV RBC AUTO: 91.3 FL (ref 79–97)
MONOCYTES # BLD AUTO: 0.48 10*3/MM3 (ref 0.1–0.9)
MONOCYTES NFR BLD AUTO: 7.4 % (ref 5–12)
NEUTROPHILS NFR BLD AUTO: 3.58 10*3/MM3 (ref 1.7–7)
NEUTROPHILS NFR BLD AUTO: 55.1 % (ref 42.7–76)
NRBC BLD AUTO-RTO: 0 /100 WBC (ref 0–0.2)
PLATELET # BLD AUTO: 185 10*3/MM3 (ref 140–450)
PMV BLD AUTO: 11.5 FL (ref 6–12)
RBC # BLD AUTO: 4.82 10*6/MM3 (ref 3.77–5.28)
WBC NRBC COR # BLD: 6.49 10*3/MM3 (ref 3.4–10.8)

## 2023-08-03 PROCEDURE — 85025 COMPLETE CBC W/AUTO DIFF WBC: CPT

## 2023-08-03 PROCEDURE — 36415 COLL VENOUS BLD VENIPUNCTURE: CPT

## 2024-02-20 NOTE — PROGRESS NOTES
"[facility] OUTPATIENT [facility]INIC FOLLOW UP VISIT    REASON FOR FOLLOW-UP:    1.  History of stage I a (pT1b, N0) hormone receptor positive HER-2 negative invasive mammary carcinoma of the lower inner quadrant of the left breast.  11 mm abnormality by imaging.  7 mm invasive carcinoma at biopsy.  She had a left mastectomy with TRAM flap reconstruction.  5 mm grade 2 tumor at resection.  DCIS measured 1.2 cm at resection.      2.  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  3.  Anastrozole 1 mg daily was initiated on 10/16/2013.  Discontinued in October 2018.  4.  History of fat necrosis above the left breast in November 2013.    HISTORY OF PRESENT ILLNESS:  Jennifer Amos is a 73 y.o. female who returns today for follow up of the above issue.      Overall doing well.  Occasional migrating pain and discomfort in both breasts but nothing that is persistent.  Sometimes it is sharp and sometimes it is dull.  She is volunteering and doing more and therefore feeling better.  She continues to watch her diet and is losing weight.    REVIEW OF SYSTEMS:  As per the HPI    Vitals:    02/21/24 1331   BP: 131/80   Pulse: 70   Resp: 16   Temp: 97.3 °F (36.3 °C)   TempSrc: Temporal   SpO2: 98%   Weight: 79.8 kg (175 lb 14.4 oz)   Height: 167.6 cm (65.98\")   PainSc: 0-No pain         PHYSICAL EXAMINATION:  General:  No acute distress, awake, alert and oriented  Skin:  Warm and dry, no visible rash  HEENT:  normocephalic/atraumatic.  Wearing a face mask.  Hearing loss noted.  Chest:  Normal respiratory effort.  Lungs clear to auscultation bilaterally.  Heart: Regular rate and rhythm without murmurs gallops or rubs  Extremities:  No visible clubbing, cyanosis, or edema  Neuro/psych:  Grossly non-focal.  Normal mood and affect.  Breasts: Both breasts were examined again today.  Left TRAM flap.  Right breast status post reduction.  Evidence of a prior biopsy at the 3 o'clock position of the right breast. "  There is some nodularity measuring a few millimeters at the 6 o'clock position just below the nipple.    DIAGNOSTIC DATA:  CBC & Differential (02/21/2024 13:00)     IMAGING:   None reviewed    ASSESSMENT:  This is a 73 y.o. 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    *History of stage I hormone receptor positive HER-2 negative invasive mammary carcinoma of the lower inner quadrant of the left breast.    She had a left mastectomy with TRAM flap reconstruction.    Anastrozole 1 mg daily was initiated October 2013.    She had a very low Oncotype DX recurrence score.    5 years of therapy was complete in October 2018.  Mammogram 7/28/2023 BI-RADS Category 2  Due for mammogram around July    *History of fat necrosis of the left breast    *Osteopenia:  She gets annual Reclast by primary care    *Frequent urinary tract infections: She saw Dr. Baires with urogynecology with no explanation. Vaginal estrogen suggested but not pursued.  Not discussed again today.    *History of iron deficiency anemia:   She saw Dr. Simpson with gastroenterology.  She had a couple of polyps removed from her colon.  She was found to have H. pylori gastritis.  Treatment complete.    Hemoglobin remains normal at 14.4    *Hearing loss:   She states this occurred shortly after starting the anastrozole.  Difficult to prove whether the medication cause this or not.    She never really mentioned that to me while she was on the anastrazole.    She is off of anastrozole at this point.  No improvement.    Hearing loss persists. She has hearing aids which help significantly    *Depression and weight gain: Both are better at this point and she continues to lose weight.    *Deposit of adipose tissue below the left breast. She saw Dr. Wermerling with nothing to do about this..     PLAN:   Monitor breast pain and tenderness.  This can still be nerve regeneration following her surgeries.  I advised her I do not think this is a  concern for recurrent malignancy.  A mammogram is due in July.  Assuming everything looks okay I will see her back in 6 months for follow-up for a checkup and breast examination.  We are certainly available sooner if needed.

## 2024-02-21 ENCOUNTER — OFFICE VISIT (OUTPATIENT)
Dept: ONCOLOGY | Facility: CLINIC | Age: 74
End: 2024-02-21
Payer: MEDICARE

## 2024-02-21 ENCOUNTER — LAB (OUTPATIENT)
Dept: LAB | Facility: HOSPITAL | Age: 74
End: 2024-02-21
Payer: MEDICARE

## 2024-02-21 VITALS
WEIGHT: 175.9 LBS | HEART RATE: 70 BPM | HEIGHT: 66 IN | DIASTOLIC BLOOD PRESSURE: 80 MMHG | BODY MASS INDEX: 28.27 KG/M2 | TEMPERATURE: 97.3 F | OXYGEN SATURATION: 98 % | SYSTOLIC BLOOD PRESSURE: 131 MMHG | RESPIRATION RATE: 16 BRPM

## 2024-02-21 DIAGNOSIS — Z85.3 HISTORY OF BREAST CANCER: Primary | ICD-10-CM

## 2024-02-21 DIAGNOSIS — Z85.3 HISTORY OF BREAST CANCER: ICD-10-CM

## 2024-02-21 LAB
BASOPHILS # BLD AUTO: 0.07 10*3/MM3 (ref 0–0.2)
BASOPHILS NFR BLD AUTO: 1.2 % (ref 0–1.5)
DEPRECATED RDW RBC AUTO: 46.9 FL (ref 37–54)
EOSINOPHIL # BLD AUTO: 0.16 10*3/MM3 (ref 0–0.4)
EOSINOPHIL NFR BLD AUTO: 2.9 % (ref 0.3–6.2)
ERYTHROCYTE [DISTWIDTH] IN BLOOD BY AUTOMATED COUNT: 14.3 % (ref 12.3–15.4)
HCT VFR BLD AUTO: 42.3 % (ref 34–46.6)
HGB BLD-MCNC: 14.4 G/DL (ref 12–15.9)
IMM GRANULOCYTES # BLD AUTO: 0.07 10*3/MM3 (ref 0–0.05)
IMM GRANULOCYTES NFR BLD AUTO: 1.2 % (ref 0–0.5)
LYMPHOCYTES # BLD AUTO: 2.05 10*3/MM3 (ref 0.7–3.1)
LYMPHOCYTES NFR BLD AUTO: 36.5 % (ref 19.6–45.3)
MCH RBC QN AUTO: 30.6 PG (ref 26.6–33)
MCHC RBC AUTO-ENTMCNC: 34 G/DL (ref 31.5–35.7)
MCV RBC AUTO: 90 FL (ref 79–97)
MONOCYTES # BLD AUTO: 0.43 10*3/MM3 (ref 0.1–0.9)
MONOCYTES NFR BLD AUTO: 7.7 % (ref 5–12)
NEUTROPHILS NFR BLD AUTO: 2.83 10*3/MM3 (ref 1.7–7)
NEUTROPHILS NFR BLD AUTO: 50.5 % (ref 42.7–76)
NRBC BLD AUTO-RTO: 0 /100 WBC (ref 0–0.2)
PLATELET # BLD AUTO: 190 10*3/MM3 (ref 140–450)
PMV BLD AUTO: 11.5 FL (ref 6–12)
RBC # BLD AUTO: 4.7 10*6/MM3 (ref 3.77–5.28)
WBC NRBC COR # BLD AUTO: 5.61 10*3/MM3 (ref 3.4–10.8)

## 2024-02-21 PROCEDURE — 36415 COLL VENOUS BLD VENIPUNCTURE: CPT

## 2024-02-21 PROCEDURE — 85025 COMPLETE CBC W/AUTO DIFF WBC: CPT

## 2024-02-21 RX ORDER — FLUOROURACIL 50 MG/G
CREAM TOPICAL
COMMUNITY
Start: 2024-02-01

## 2024-04-03 ENCOUNTER — TELEPHONE (OUTPATIENT)
Dept: ONCOLOGY | Facility: CLINIC | Age: 74
End: 2024-04-03
Payer: MEDICARE

## 2024-04-03 NOTE — TELEPHONE ENCOUNTER
Caller: Jennifer Amos    Relationship: Self    Best call back number: [phone]  MAY LEAVE     What is the best time to reach you: ANYTIME    Who are you requesting to speak with (clinical staff, provider,  specific staff member): CLINICAL    What was the call regarding: PT'S DERMATOLOGIST HAS PRESCRIBED HER A MEDICATION CALLED FINASTERIDE FOR HAIR LOSS AND WAS TO CLEAR THIS WILL DR GARDUNO BEFORE TAKING  PLEASE ADVISE

## 2024-04-03 NOTE — TELEPHONE ENCOUNTER
Informed patient Dr. Combs is out of the office, but that I would touch base with him about it when he returns.

## 2024-04-09 NOTE — TELEPHONE ENCOUNTER
Patient verbalized understanding of Dr. Arriaza's message. She expressed hesitations because she said her cancer is ER positive and I informed her that she should not do anything she's not comfortable with but that Dr. Combs had no objection.

## 2024-04-17 ENCOUNTER — TRANSCRIBE ORDERS (OUTPATIENT)
Dept: ADMINISTRATIVE | Facility: HOSPITAL | Age: 74
End: 2024-04-17
Payer: MEDICARE

## 2024-04-17 DIAGNOSIS — Z12.31 SCREENING MAMMOGRAM, ENCOUNTER FOR: Primary | ICD-10-CM

## 2024-07-31 ENCOUNTER — HOSPITAL ENCOUNTER (OUTPATIENT)
Dept: MAMMOGRAPHY | Facility: HOSPITAL | Age: 74
Discharge: HOME | End: 2024-07-31
Admitting: INTERNAL MEDICINE
Payer: MEDICARE

## 2024-07-31 DIAGNOSIS — Z12.31 SCREENING MAMMOGRAM, ENCOUNTER FOR: ICD-10-CM

## 2024-07-31 PROCEDURE — 77063 BREAST TOMOSYNTHESIS BI: CPT

## 2024-07-31 PROCEDURE — 77067 SCR MAMMO BI INCL CAD: CPT

## 2024-09-10 NOTE — PROGRESS NOTES
[facility] OUTPATIENT CLINIC FOLLOW UP VISIT    REASON FOR FOLLOW-UP:    1.  History of stage I a (pT1b, N0) hormone receptor positive HER-2 negative invasive mammary carcinoma of the lower inner quadrant of the left breast.  11 mm abnormality by imaging.  7 mm invasive carcinoma at biopsy.  She had a left mastectomy with TRAM flap reconstruction.  5 mm grade 2 tumor at resection.  DCIS measured 1.2 cm at resection.      2.  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  3.  Anastrozole 1 mg daily was initiated on 10/16/2013.  Discontinued in October 2018.  4.  History of fat necrosis above the left breast in November 2013.    HISTORY OF PRESENT ILLNESS:  Jennifer Amos is a 74 y.o. female who returns today for follow up of the above issue.      She has stopped monitoring her diet and since her son moved in with her she has been eating differently and has gained quite a bit of weight.  She denies noticing any new issues with her breasts.  Stable hearing loss.    She does note some mild pharyngitis.  No fevers.  No cough.  No other symptoms other than a mild hoarse voice.      REVIEW OF SYSTEMS:  As per the HPI    Vitals:    09/11/24 0949   BP: 104/68   Pulse: 72   Temp: 97.6 °F (36.4 °C)   TempSrc: Oral   SpO2: 97%   Weight: 87.2 kg (192 lb 3.2 oz)   PainSc: 0-No pain         PHYSICAL EXAMINATION:  General:  No acute distress, awake, alert and oriented  Skin:  Warm and dry, no visible rash  HEENT:  normocephalic/atraumatic.  Wearing a face mask.  Hearing loss noted.  Chest:  Normal respiratory effort.  Lungs clear to auscultation bilaterally.  Heart: Regular rate and rhythm without murmurs gallops or rubs  Extremities:  No visible clubbing, cyanosis, or edema  Neuro/psych:  Grossly non-focal.  Normal mood and affect.  Breasts: Both breasts were again examined today.  Left TRAM flap.  Right breast status post reduction.  Evidence of a prior biopsy at the 3 o'clock position of the  right breast.  No nodularity palpated today.    DIAGNOSTIC DATA:  CBC & Differential (09/11/2024 09:23)         IMAGING:   Mammo Screening Digital Tomosynthesis Bilateral With CAD (07/31/2024 12:30)     ASSESSMENT:  This is a 74 y.o. 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    *History of stage I hormone receptor positive HER-2 negative invasive mammary carcinoma of the lower inner quadrant of the left breast.    She had a left mastectomy with TRAM flap reconstruction.    Anastrozole 1 mg daily was initiated October 2013.    She had a very low Oncotype DX recurrence score.    5 years of therapy was complete in October 2018.  Mammogram 7/31/2024 BI-RADS Category 2    *History of fat necrosis of the left breast    *Osteopenia:  She gets annual Reclast by primary care    *Frequent urinary tract infections: She saw Dr. Baires with urogynecology with no explanation. Vaginal estrogen suggested but not pursued.  Did not discuss this today    *History of iron deficiency anemia:   She saw Dr. Simpson with gastroenterology.  She had a couple of polyps removed from her colon.  She was found to have H. pylori gastritis.  Treatment complete.    Hemoglobin remains normal at 13.1    *Hearing loss:   She states this occurred shortly after starting the anastrozole.  Difficult to prove whether the medication cause this or not.    She never really mentioned that to me while she was on the anastrazole.    She is off of anastrozole at this point.  No improvement.    Hearing loss persists. She has hearing aids which help significantly    *Depression and weight gain: She had initially been monitoring her diet and losing weight but now has gained a lot of weight again since she states she stopped caring.    *Deposit of adipose tissue below the left breast. She saw Dr. Wermerling with nothing to do about this..     PLAN:   Follow-up in 6 months with a CBC and breast examination.  I encouraged her to notify us if  she needs anything prior to that.  She will have her mammogram and July or August in 2025.  Primary care has been ordering this.   Cyclophosphamide Pregnancy And Lactation Text: This medication is Pregnancy Category D and it isn't considered safe during pregnancy. This medication is excreted in breast milk.

## 2024-09-11 ENCOUNTER — LAB (OUTPATIENT)
Dept: LAB | Facility: HOSPITAL | Age: 74
End: 2024-09-11
Payer: MEDICARE

## 2024-09-11 ENCOUNTER — OFFICE VISIT (OUTPATIENT)
Dept: ONCOLOGY | Facility: CLINIC | Age: 74
End: 2024-09-11
Payer: MEDICARE

## 2024-09-11 VITALS
DIASTOLIC BLOOD PRESSURE: 68 MMHG | SYSTOLIC BLOOD PRESSURE: 104 MMHG | OXYGEN SATURATION: 97 % | TEMPERATURE: 97.6 F | WEIGHT: 192.2 LBS | BODY MASS INDEX: 31.04 KG/M2 | HEART RATE: 72 BPM

## 2024-09-11 DIAGNOSIS — Z85.3 HISTORY OF BREAST CANCER: ICD-10-CM

## 2024-09-11 DIAGNOSIS — Z85.3 HISTORY OF BREAST CANCER: Primary | ICD-10-CM

## 2024-09-11 LAB
BASOPHILS # BLD AUTO: 0.04 10*3/MM3 (ref 0–0.2)
BASOPHILS NFR BLD AUTO: 0.7 % (ref 0–1.5)
DEPRECATED RDW RBC AUTO: 45.1 FL (ref 37–54)
EOSINOPHIL # BLD AUTO: 0.2 10*3/MM3 (ref 0–0.4)
EOSINOPHIL NFR BLD AUTO: 3.5 % (ref 0.3–6.2)
ERYTHROCYTE [DISTWIDTH] IN BLOOD BY AUTOMATED COUNT: 13.7 % (ref 12.3–15.4)
HCT VFR BLD AUTO: 39.9 % (ref 34–46.6)
HGB BLD-MCNC: 13.1 G/DL (ref 12–15.9)
IMM GRANULOCYTES # BLD AUTO: 0.02 10*3/MM3 (ref 0–0.05)
IMM GRANULOCYTES NFR BLD AUTO: 0.3 % (ref 0–0.5)
LYMPHOCYTES # BLD AUTO: 1.85 10*3/MM3 (ref 0.7–3.1)
LYMPHOCYTES NFR BLD AUTO: 32.3 % (ref 19.6–45.3)
MCH RBC QN AUTO: 29.7 PG (ref 26.6–33)
MCHC RBC AUTO-ENTMCNC: 32.8 G/DL (ref 31.5–35.7)
MCV RBC AUTO: 90.5 FL (ref 79–97)
MONOCYTES # BLD AUTO: 0.61 10*3/MM3 (ref 0.1–0.9)
MONOCYTES NFR BLD AUTO: 10.7 % (ref 5–12)
NEUTROPHILS NFR BLD AUTO: 3 10*3/MM3 (ref 1.7–7)
NEUTROPHILS NFR BLD AUTO: 52.5 % (ref 42.7–76)
NRBC BLD AUTO-RTO: 0 /100 WBC (ref 0–0.2)
PLATELET # BLD AUTO: 174 10*3/MM3 (ref 140–450)
PMV BLD AUTO: 11.6 FL (ref 6–12)
RBC # BLD AUTO: 4.41 10*6/MM3 (ref 3.77–5.28)
WBC NRBC COR # BLD AUTO: 5.72 10*3/MM3 (ref 3.4–10.8)

## 2024-09-11 PROCEDURE — 85025 COMPLETE CBC W/AUTO DIFF WBC: CPT

## 2024-09-11 PROCEDURE — 1160F RVW MEDS BY RX/DR IN RCRD: CPT | Performed by: INTERNAL MEDICINE

## 2024-09-11 PROCEDURE — 1126F AMNT PAIN NOTED NONE PRSNT: CPT | Performed by: INTERNAL MEDICINE

## 2024-09-11 PROCEDURE — G2211 COMPLEX E/M VISIT ADD ON: HCPCS | Performed by: INTERNAL MEDICINE

## 2024-09-11 PROCEDURE — 1159F MED LIST DOCD IN RCRD: CPT | Performed by: INTERNAL MEDICINE

## 2024-09-11 PROCEDURE — 36415 COLL VENOUS BLD VENIPUNCTURE: CPT

## 2024-09-11 PROCEDURE — 99213 OFFICE O/P EST LOW 20 MIN: CPT | Performed by: INTERNAL MEDICINE

## 2024-10-03 NOTE — PROGRESS NOTES
[facility] GROUP OUTPATIENT CLINIC FOLLOW UP VISIT    REASON FOR FOLLOW-UP:    1.  History of stage I a (pT1b, N0) hormone receptor positive HER-2 negative invasive mammary carcinoma of the lower inner quadrant of the left breast.  11 mm abnormality by imaging.  7 mm invasive carcinoma at biopsy.  She had a left mastectomy with TRAM flap reconstruction.  5 mm grade 2 tumor at resection.  DCIS measured 1.2 cm at resection.      2.  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  3.  Anastrozole 1 mg daily was initiated on 10/16/2013.  Discontinued in October 2018.  4.  History of fat necrosis above the left breast in November 2013.    HISTORY OF PRESENT ILLNESS:  Jennifer Amos is a 70 y.o. female who returns today for follow up of the above issue.      She has been having some intermittent mild but sharp pain in the left breast ongoing for the past couple of months.  This lasts for a few seconds at a time.  It is about 2 out of 10 in severity.  Nothing seems to make it better or worse.  This typically occurs in the upper part of the left breast.  Occasionally she notes it in the right breast as well.  She never had this before couple of months ago.  She is anxious about it due to her history of breast cancer.    She remains anxious regarding her 's condition.  He has MDS/AML and is currently undergoing inpatient therapy at [facility] in [place].  He is also my patient.  She is on medication which is helping with her anxiety and depression.    PAST MEDICAL, SURGICAL, FAMILY, AND SOCIAL HISTORIES WERE REVIEWED WITH THE PATIENT AND IN THE ELECTRONIC MEDICAL RECORD, AND WERE UPDATED IF INDICATED.    ALLERGIES:  Allergies   Allergen Reactions   • Hydrocodone Shortness Of Breath   • Oxycodone-Acetaminophen Shortness Of Breath   • Percocet [Oxycodone-Acetaminophen] Shortness Of Breath   • Sulfa Antibiotics      Stomach pain   • Penicillins Unknown - Low Severity     CHILDHOOD REACTION  "      MEDICATIONS:  The medication list has been reviewed with the patient by the medical assistant, and the list has been updated in the electronic medical record, which I reviewed.  Medication dosages and frequencies were confirmed to be accurate.    REVIEW OF SYSTEMS:  PAIN:  See Vital Signs below.  GENERAL:  No fevers, chills, night sweats, or unintended weight loss.    SKIN:  No rash or non-healing lesions  HEME/LYMPH:  No abnormal bleeding.  No palpable lymphadenopathy.  EYES:  No vision changes or diplopia.  ENT:  Hearing loss  RESPIRATORY:  No cough, shortness of breath, hemoptysis, or wheezing.  CARDIOVASCULAR:  No chest pain, palpitations, orthopnea, or dyspnea on exertion.  GASTROINTESTINAL:  No abdominal pain, nausea, vomiting, constipation, diarrhea, melena.  GENITOURINARY:  Did not complain of urinary symptoms today.  MUSCULOSKELETAL:  No joint pain, swelling, or erythema.  NEUROLOGIC:  No dizziness, loss of consciousness, or seizures.  PSYCHIATRIC: Depression and anxiety    Vitals:    07/06/20 1147   BP: 139/82   Pulse: 78   Resp: 16   Temp: 97.5 °F (36.4 °C)   TempSrc: Oral   SpO2: 98%   Weight: 83.3 kg (183 lb 11.2 oz)   Height: 165.5 cm (65.16\")   PainSc: 0-No pain  Comment: breast cancer       PHYSICAL EXAMINATION:  General:  No acute distress, awake, alert and oriented  Skin:  Warm and dry, no visible rash  HEENT:  normocephalic/atraumatic.  Wearing a mask.  Chest:  Normal respiratory effort.  Lungs clear to auscultation bilaterally.  Heart: Regular rate and rhythm without murmurs gallops or rubs  Extremities:  No visible clubbing, cyanosis, or edema  Neuro/psych:  Grossly non-focal.  Normal mood and affect.  Breasts: Both breasts were examined today.  Left TRAM flap.  Right breast status post reduction.  There is some excess fatty tissue just beneath the left TRAM flap.  No nodules or nipple discharge in either breast.  No axillary adenopathy.      DIAGNOSTIC DATA:  Results for orders placed or " performed in visit on 07/06/20   Retic With IRF & RET-He   Result Value Ref Range    Immature Reticulocyte Fraction 10.1 3.0 - 15.8 %    Reticulocyte % 1.56 0.70 - 1.90 %    Reticulocyte Hgb 35.1 29.8 - 36.1 pg   CBC Auto Differential   Result Value Ref Range    WBC 6.38 3.40 - 10.80 10*3/mm3    RBC 4.44 3.77 - 5.28 10*6/mm3    Hemoglobin 13.5 12.0 - 15.9 g/dL    Hematocrit 40.7 34.0 - 46.6 %    MCV 91.7 79.0 - 97.0 fL    MCH 30.4 26.6 - 33.0 pg    MCHC 33.2 31.5 - 35.7 g/dL    RDW 13.3 12.3 - 15.4 %    RDW-SD 44.7 37.0 - 54.0 fl    MPV 12.1 (H) 6.0 - 12.0 fL    Platelets 173 140 - 450 10*3/mm3    Neutrophil % 55.3 42.7 - 76.0 %    Lymphocyte % 33.4 19.6 - 45.3 %    Monocyte % 6.7 5.0 - 12.0 %    Eosinophil % 3.0 0.3 - 6.2 %    Basophil % 0.8 0.0 - 1.5 %    Immature Grans % 0.8 (H) 0.0 - 0.5 %    Neutrophils, Absolute 3.53 1.70 - 7.00 10*3/mm3    Lymphocytes, Absolute 2.13 0.70 - 3.10 10*3/mm3    Monocytes, Absolute 0.43 0.10 - 0.90 10*3/mm3    Eosinophils, Absolute 0.19 0.00 - 0.40 10*3/mm3    Basophils, Absolute 0.05 0.00 - 0.20 10*3/mm3    Immature Grans, Absolute 0.05 0.00 - 0.05 10*3/mm3    nRBC 0.0 0.0 - 0.2 /100 WBC     IMAGING:     Mammogram 6/25/2020 BI-RADS Category 2.    ASSESSMENT:  This is a 70 y.o. female with:  1.  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  2.  History of stage I hormone receptor positive HER-2 negative invasive mammary carcinoma of the lower inner quadrant of the left breast.  She had a left mastectomy with TRAM flap reconstruction.  Anastrozole 1 mg daily was initiated October 2013.  She had a very low Oncotype DX recurrence score.  5 years of therapy was complete in October 2018.  3.  History of fat necrosis of the left breast  4.  Osteopenia:  She gets annual Reclast which was prescribed by Dr. Arriaza, now her new PCP Dr. Padron.    5.  Fatigue: CMP and TSH were normal  6.  Frequent urinary tract infections: She saw Dr. Baires with urogynecology.    7.  Iron deficiency  anemia: She saw Dr. Simpson with gastroenterology.  She had a couple of polyps removed from her colon.  She was found to have H. pylori gastritis.  Treatment complete.  Hemoglobin has normalized.  8.  Hearing loss: She states this occurred shortly after starting the anastrozole.  Difficult to prove whether the medication cause this or not.  She never really mentioned that to me.  She is off of anastrozole at this point.  No improvement.    9.  Depression and anxiety: Mostly related to her 's medical condition with myelodysplasia currently on  therapy at [facility].  He is also my patient.  Better with medication.  10.  Pain in both breasts mostly in the left breast greater than the right breast.  This sounds like nerve regeneration even many years postoperatively.  I explained today that typically malignancies in the breast are not painful.  She had a normal mammogram which is reassuring.  It does not sound like the pain is too severe to really be bothering her that much.  I do not think any additional imaging is required at this time.  She will let us know if this continues to be a problem for her.   11.  Access fatty tissue just below the left breast: I advised her that this is a cosmetic issue that could be addressed surgically if she would like.  She is not interested in this at this time.    PLAN:   1.  Follow-up in 6 months with a CBC and a breast examination.  I can certainly see her sooner if needed.   2.  A mammogram will be due in June 2021.    40 minutes face-to-face.  25 minutes spent counseling her regarding the breast pain and issues with her anxiety regarding her 's health.   no concerns

## 2024-12-03 ENCOUNTER — TRANSCRIBE ORDERS (OUTPATIENT)
Dept: ADMINISTRATIVE | Facility: HOSPITAL | Age: 74
End: 2024-12-03
Payer: MEDICARE

## 2024-12-03 DIAGNOSIS — R93.89 ABNORMAL CHEST X-RAY: Primary | ICD-10-CM

## 2024-12-19 ENCOUNTER — HOSPITAL ENCOUNTER (OUTPATIENT)
Dept: CT IMAGING | Facility: HOSPITAL | Age: 74
Discharge: HOME | End: 2024-12-19
Admitting: INTERNAL MEDICINE
Payer: MEDICARE

## 2024-12-19 DIAGNOSIS — R93.89 ABNORMAL CHEST X-RAY: ICD-10-CM

## 2024-12-19 PROCEDURE — 71250 CT THORAX DX C-: CPT

## 2025-03-04 NOTE — PROGRESS NOTES
[facility] GROUP OUTPATIENT CLINIC FOLLOW UP VISIT    REASON FOR FOLLOW-UP:    1.  History of stage I a (pT1b, N0) hormone receptor positive HER-2 negative invasive mammary carcinoma of the lower inner quadrant of the left breast.  11 mm abnormality by imaging.  7 mm invasive carcinoma at biopsy.  She had a left mastectomy with TRAM flap reconstruction.  5 mm grade 2 tumor at resection.  DCIS measured 1.2 cm at resection.      2.  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  3.  Anastrozole 1 mg daily was initiated on 10/16/2013.  Discontinued in October 2018.  4.  History of fat necrosis above the left breast in November 2013.    HISTORY OF PRESENT ILLNESS:  Jennifer Amos is a 74 y.o. female who returns today for follow up of the above issue.      She is keeping busy volunteering at [employer].  However, she has preferred to eat what she wants to eat and not worry about her weight since she was last here and has indeed gained quite a few pounds.    REVIEW OF SYSTEMS:  As per the HPI    Vitals:    03/05/25 1351   BP: 125/79   Pulse: 83   Temp: 98 °F (36.7 °C)   TempSrc: Oral   SpO2: 95%   Weight: 93.1 kg (205 lb 4.8 oz)   PainSc: 0-No pain       PHYSICAL EXAMINATION:  General:  No acute distress, awake, alert and oriented  Skin:  Warm and dry, no visible rash  HEENT:  normocephalic/atraumatic.  Wearing hearing aids.  Chest:  Normal respiratory effort.  Lungs clear to auscultation bilaterally.  Heart: Regular rate and rhythm without murmurs gallops or rubs  Extremities:  No visible clubbing, cyanosis, or edema  Neuro/psych:  Grossly non-focal.  Normal mood and affect.  Breasts: Both breasts were again examined today.  Left TRAM flap.  Right breast status post reduction.  Evidence of a prior biopsy at the 3 o'clock position of the right breast.  No changes on her breast examination today.  No new nodules.    DIAGNOSTIC DATA:  CBC & Differential (03/05/2025 13:30)     IMAGING:   Mammo  Screening Digital Tomosynthesis Bilateral With CAD (07/31/2024 12:30)     ASSESSMENT:  This is a 74 y.o. 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    *History of stage I hormone receptor positive HER-2 negative invasive mammary carcinoma of the lower inner quadrant of the left breast.    She had a left mastectomy with TRAM flap reconstruction.    Anastrozole 1 mg daily was initiated October 2013.    She had a very low Oncotype DX recurrence score.    5 years of therapy was complete in October 2018.  Mammogram 7/31/2024 BI-RADS Category 2    *History of fat necrosis of the left breast    *Osteopenia:  She gets annual Reclast by primary care    *Frequent urinary tract infections: She saw Dr. Baires with urogynecology with no explanation. Vaginal estrogen suggested but not pursued.  Did not discuss this again today    *History of iron deficiency anemia:   She saw Dr. Simpson with gastroenterology.  She had a couple of polyps removed from her colon.  She was found to have H. pylori gastritis.  Treatment complete.    Hemoglobin remains normal at 13.2    *Hearing loss:   She states this occurred shortly after starting the anastrozole.  Difficult to prove whether the medication cause this or not.    She never really mentioned that to me while she was on the anastrazole.    She is off of anastrozole at this point.  No improvement.    She has hearing aids which continued to help significantly    *Depression and weight gain: She had initially been monitoring her diet and losing weight but now has gained quite a bit of weight as she states that she does not care about this anymore.    *History of deposit of adipose tissue below the left breast. She saw Dr. Wermerling with nothing to do about this..     PLAN:   Mammogram due in August  Follow-up here with a CBC and breast examination in 6 months    I spent 35 minutes in this visit today reviewing her record, communicating with her, examining her,  placing orders, documenting the encounter.

## 2025-03-05 ENCOUNTER — OFFICE VISIT (OUTPATIENT)
Dept: ONCOLOGY | Facility: CLINIC | Age: 75
End: 2025-03-05
Payer: MEDICARE

## 2025-03-05 ENCOUNTER — LAB (OUTPATIENT)
Dept: LAB | Facility: HOSPITAL | Age: 75
End: 2025-03-05
Payer: MEDICARE

## 2025-03-05 VITALS
BODY MASS INDEX: 33.15 KG/M2 | TEMPERATURE: 98 F | HEART RATE: 83 BPM | OXYGEN SATURATION: 95 % | DIASTOLIC BLOOD PRESSURE: 79 MMHG | SYSTOLIC BLOOD PRESSURE: 125 MMHG | WEIGHT: 205.3 LBS

## 2025-03-05 DIAGNOSIS — Z85.3 HISTORY OF BREAST CANCER: Primary | ICD-10-CM

## 2025-03-05 DIAGNOSIS — Z85.3 HISTORY OF BREAST CANCER: ICD-10-CM

## 2025-03-05 LAB
BASOPHILS # BLD AUTO: 0.05 10*3/MM3 (ref 0–0.2)
BASOPHILS NFR BLD AUTO: 0.6 % (ref 0–1.5)
DEPRECATED RDW RBC AUTO: 45.1 FL (ref 37–54)
EOSINOPHIL # BLD AUTO: 0.23 10*3/MM3 (ref 0–0.4)
EOSINOPHIL NFR BLD AUTO: 2.7 % (ref 0.3–6.2)
ERYTHROCYTE [DISTWIDTH] IN BLOOD BY AUTOMATED COUNT: 14 % (ref 12.3–15.4)
HCT VFR BLD AUTO: 40 % (ref 34–46.6)
HGB BLD-MCNC: 13.2 G/DL (ref 12–15.9)
IMM GRANULOCYTES # BLD AUTO: 0.09 10*3/MM3 (ref 0–0.05)
IMM GRANULOCYTES NFR BLD AUTO: 1.1 % (ref 0–0.5)
LYMPHOCYTES # BLD AUTO: 2.54 10*3/MM3 (ref 0.7–3.1)
LYMPHOCYTES NFR BLD AUTO: 30.2 % (ref 19.6–45.3)
MCH RBC QN AUTO: 29.1 PG (ref 26.6–33)
MCHC RBC AUTO-ENTMCNC: 33 G/DL (ref 31.5–35.7)
MCV RBC AUTO: 88.3 FL (ref 79–97)
MONOCYTES # BLD AUTO: 0.64 10*3/MM3 (ref 0.1–0.9)
MONOCYTES NFR BLD AUTO: 7.6 % (ref 5–12)
NEUTROPHILS NFR BLD AUTO: 4.87 10*3/MM3 (ref 1.7–7)
NEUTROPHILS NFR BLD AUTO: 57.8 % (ref 42.7–76)
NRBC BLD AUTO-RTO: 0 /100 WBC (ref 0–0.2)
PLATELET # BLD AUTO: 231 10*3/MM3 (ref 140–450)
PMV BLD AUTO: 11 FL (ref 6–12)
RBC # BLD AUTO: 4.53 10*6/MM3 (ref 3.77–5.28)
WBC NRBC COR # BLD AUTO: 8.42 10*3/MM3 (ref 3.4–10.8)

## 2025-03-05 PROCEDURE — 36415 COLL VENOUS BLD VENIPUNCTURE: CPT

## 2025-03-05 PROCEDURE — 85025 COMPLETE CBC W/AUTO DIFF WBC: CPT

## 2025-05-23 ENCOUNTER — TRANSCRIBE ORDERS (OUTPATIENT)
Dept: ONCOLOGY | Facility: CLINIC | Age: 75
End: 2025-05-23
Payer: MEDICARE

## 2025-05-23 DIAGNOSIS — Z12.31 SCREENING MAMMOGRAM FOR BREAST CANCER: Primary | ICD-10-CM

## 2025-06-07 ENCOUNTER — APPOINTMENT (OUTPATIENT)
Dept: GENERAL RADIOLOGY | Facility: HOSPITAL | Age: 75
End: 2025-06-07
Payer: MEDICARE

## 2025-06-07 ENCOUNTER — HOSPITAL ENCOUNTER (EMERGENCY)
Facility: HOSPITAL | Age: 75
Discharge: HOME | End: 2025-06-07
Attending: EMERGENCY MEDICINE
Payer: MEDICARE

## 2025-06-07 VITALS
HEART RATE: 76 BPM | RESPIRATION RATE: 14 BRPM | TEMPERATURE: 98.1 F | OXYGEN SATURATION: 100 % | DIASTOLIC BLOOD PRESSURE: 78 MMHG | BODY MASS INDEX: 31.98 KG/M2 | WEIGHT: 199 LBS | SYSTOLIC BLOOD PRESSURE: 118 MMHG | HEIGHT: 66 IN

## 2025-06-07 DIAGNOSIS — R53.83 OTHER FATIGUE: Primary | ICD-10-CM

## 2025-06-07 DIAGNOSIS — R07.89 ATYPICAL CHEST PAIN: ICD-10-CM

## 2025-06-07 LAB
ALBUMIN SERPL-MCNC: 4.3 G/DL (ref 3.5–5.2)
ALBUMIN/GLOB SERPL: 1.4 G/DL
ALP SERPL-CCNC: 78 U/L (ref 39–117)
ALT SERPL W P-5'-P-CCNC: 17 U/L (ref 1–33)
ANION GAP SERPL CALCULATED.3IONS-SCNC: 10 MMOL/L (ref 5–15)
AST SERPL-CCNC: 23 U/L (ref 1–32)
BASOPHILS # BLD AUTO: 0.05 10*3/MM3 (ref 0–0.2)
BASOPHILS NFR BLD AUTO: 0.9 % (ref 0–1.5)
BILIRUB SERPL-MCNC: 0.7 MG/DL (ref 0–1.2)
BILIRUB UR QL STRIP: NEGATIVE
BUN SERPL-MCNC: 12 MG/DL (ref 8–23)
BUN/CREAT SERPL: 13.5 (ref 7–25)
CALCIUM SPEC-SCNC: 9.8 MG/DL (ref 8.6–10.5)
CHLORIDE SERPL-SCNC: 102 MMOL/L (ref 98–107)
CK SERPL-CCNC: 131 U/L (ref 20–180)
CLARITY UR: CLEAR
CO2 SERPL-SCNC: 25 MMOL/L (ref 22–29)
COLOR UR: YELLOW
CREAT SERPL-MCNC: 0.89 MG/DL (ref 0.57–1)
DEPRECATED RDW RBC AUTO: 44.9 FL (ref 37–54)
EGFRCR SERPLBLD CKD-EPI 2021: 68.1 ML/MIN/1.73
EOSINOPHIL # BLD AUTO: 0.15 10*3/MM3 (ref 0–0.4)
EOSINOPHIL NFR BLD AUTO: 2.7 % (ref 0.3–6.2)
ERYTHROCYTE [DISTWIDTH] IN BLOOD BY AUTOMATED COUNT: 13.7 % (ref 12.3–15.4)
GEN 5 1HR TROPONIN T REFLEX: 7 NG/L
GLOBULIN UR ELPH-MCNC: 3 GM/DL
GLUCOSE SERPL-MCNC: 83 MG/DL (ref 65–99)
GLUCOSE UR STRIP-MCNC: NEGATIVE MG/DL
HCT VFR BLD AUTO: 41.4 % (ref 34–46.6)
HETEROPH AB SER QL LA: NEGATIVE
HGB BLD-MCNC: 13.3 G/DL (ref 12–15.9)
HGB UR QL STRIP.AUTO: NEGATIVE
IMM GRANULOCYTES # BLD AUTO: 0.01 10*3/MM3 (ref 0–0.05)
IMM GRANULOCYTES NFR BLD AUTO: 0.2 % (ref 0–0.5)
KETONES UR QL STRIP: ABNORMAL
LEUKOCYTE ESTERASE UR QL STRIP.AUTO: NEGATIVE
LYMPHOCYTES # BLD AUTO: 2.46 10*3/MM3 (ref 0.7–3.1)
LYMPHOCYTES NFR BLD AUTO: 44.4 % (ref 19.6–45.3)
MAGNESIUM SERPL-MCNC: 2.3 MG/DL (ref 1.6–2.4)
MCH RBC QN AUTO: 28.9 PG (ref 26.6–33)
MCHC RBC AUTO-ENTMCNC: 32.1 G/DL (ref 31.5–35.7)
MCV RBC AUTO: 90 FL (ref 79–97)
MONOCYTES # BLD AUTO: 0.52 10*3/MM3 (ref 0.1–0.9)
MONOCYTES NFR BLD AUTO: 9.4 % (ref 5–12)
NEUTROPHILS NFR BLD AUTO: 2.35 10*3/MM3 (ref 1.7–7)
NEUTROPHILS NFR BLD AUTO: 42.4 % (ref 42.7–76)
NITRITE UR QL STRIP: NEGATIVE
NRBC BLD AUTO-RTO: 0 /100 WBC (ref 0–0.2)
NT-PROBNP SERPL-MCNC: 48.4 PG/ML (ref 0–900)
PH UR STRIP.AUTO: 5.5 [PH] (ref 5–8)
PHOSPHATE SERPL-MCNC: 3.5 MG/DL (ref 2.5–4.5)
PLATELET # BLD AUTO: 200 10*3/MM3 (ref 140–450)
PMV BLD AUTO: 11.7 FL (ref 6–12)
POTASSIUM SERPL-SCNC: 4.5 MMOL/L (ref 3.5–5.2)
PROT SERPL-MCNC: 7.3 G/DL (ref 6–8.5)
PROT UR QL STRIP: NEGATIVE
RBC # BLD AUTO: 4.6 10*6/MM3 (ref 3.77–5.28)
SODIUM SERPL-SCNC: 137 MMOL/L (ref 136–145)
SP GR UR STRIP: <=1.005 (ref 1–1.03)
TROPONIN T NUMERIC DELTA: -1 NG/L
TROPONIN T SERPL HS-MCNC: 8 NG/L
TSH SERPL DL<=0.05 MIU/L-ACNC: 2.14 UIU/ML (ref 0.27–4.2)
UROBILINOGEN UR QL STRIP: ABNORMAL
WBC NRBC COR # BLD AUTO: 5.54 10*3/MM3 (ref 3.4–10.8)

## 2025-06-07 PROCEDURE — 81003 URINALYSIS AUTO W/O SCOPE: CPT | Performed by: EMERGENCY MEDICINE

## 2025-06-07 PROCEDURE — 86618 LYME DISEASE ANTIBODY: CPT | Performed by: EMERGENCY MEDICINE

## 2025-06-07 PROCEDURE — 87468 ANAPLSMA PHGCYTOPHLM AMP PRB: CPT | Performed by: EMERGENCY MEDICINE

## 2025-06-07 PROCEDURE — 87484 EHRLICHA CHAFFEENSIS AMP PRB: CPT | Performed by: EMERGENCY MEDICINE

## 2025-06-07 PROCEDURE — 99284 EMERGENCY DEPT VISIT MOD MDM: CPT

## 2025-06-07 PROCEDURE — 83735 ASSAY OF MAGNESIUM: CPT | Performed by: EMERGENCY MEDICINE

## 2025-06-07 PROCEDURE — 82550 ASSAY OF CK (CPK): CPT | Performed by: EMERGENCY MEDICINE

## 2025-06-07 PROCEDURE — 36415 COLL VENOUS BLD VENIPUNCTURE: CPT

## 2025-06-07 PROCEDURE — 93010 ELECTROCARDIOGRAM REPORT: CPT | Performed by: STUDENT IN AN ORGANIZED HEALTH CARE EDUCATION/TRAINING PROGRAM

## 2025-06-07 PROCEDURE — 84100 ASSAY OF PHOSPHORUS: CPT | Performed by: EMERGENCY MEDICINE

## 2025-06-07 PROCEDURE — 85025 COMPLETE CBC W/AUTO DIFF WBC: CPT | Performed by: EMERGENCY MEDICINE

## 2025-06-07 PROCEDURE — 84443 ASSAY THYROID STIM HORMONE: CPT | Performed by: EMERGENCY MEDICINE

## 2025-06-07 PROCEDURE — 80053 COMPREHEN METABOLIC PANEL: CPT | Performed by: EMERGENCY MEDICINE

## 2025-06-07 PROCEDURE — 87798 DETECT AGENT NOS DNA AMP: CPT | Performed by: EMERGENCY MEDICINE

## 2025-06-07 PROCEDURE — 84484 ASSAY OF TROPONIN QUANT: CPT | Performed by: EMERGENCY MEDICINE

## 2025-06-07 PROCEDURE — 93005 ELECTROCARDIOGRAM TRACING: CPT | Performed by: EMERGENCY MEDICINE

## 2025-06-07 PROCEDURE — 71045 X-RAY EXAM CHEST 1 VIEW: CPT

## 2025-06-07 PROCEDURE — 83880 ASSAY OF NATRIURETIC PEPTIDE: CPT | Performed by: EMERGENCY MEDICINE

## 2025-06-07 PROCEDURE — 86308 HETEROPHILE ANTIBODY SCREEN: CPT | Performed by: EMERGENCY MEDICINE

## 2025-06-07 NOTE — ED PROVIDER NOTES
EMERGENCY DEPARTMENT ENCOUNTER  Room Number:  27/27  PCP: Berenice Padron MD  Independent Historians: Patient  Date of encounter:  6/7/2025  Patient Care Team:  Berenice Padron MD as PCP - General (Internal Medicine)  Jeremi Combs MD as Consulting Physician (Hematology and Oncology)  Alpa Pineda MD as Referring Physician (Breast Surgery)  Briseyda Simpson MD as Consulting Physician (Gastroenterology)     HPI:  Chief Complaint: had concerns including Weakness - Generalized.     A complete HPI/ROS/PMH/PSH/SH/FH are unobtainable due to: None    Chronic or social conditions impacting patient care (Social Determinants of Health): None    Context: Jennifer Amos is a 74 y.o. female with a medical history of breast cancer, hypertension, hypothyroidism, sarcoidosis, GERD who presents to the ED c/o acute fatigue.  Patient has had fatigue as well as multiple other spurious symptoms for the past 3 months.  Patient has had fatigue, brain fog, intermittent chest pain and urinary incontinence for the past 3 months.  She states she has been seen twice this week for these complaints including an [facility] visit as well as a visit to her family physician.  She is also concerned about her thyroid as it has not been checked recently.  She states that she developed some small amounts of urinary incontinence over the past few days and was started on a medication for this.  She denies any dysuria or hematuria.  She states she has had decreased p.o. intake due to not having interest in food.  She denies any weight changes.  She states she has had normal mammograms recently and receives them annually.  Prior colonoscopy had precancerous polyps that were removed.  She is scheduled to see a GI physician to discuss her reflux in June of this year.  She denies fevers or chills.  She also reports mild intermittent chest pain throughout the week though none currently.    PAST MEDICAL HISTORY  Active Ambulatory Problems      Diagnosis Date Noted    OA (osteoarthritis) of knee 03/24/2016    Senile osteoporosis 05/06/2016    History of breast cancer 05/18/2016    Neuritis of foot 07/13/2016    Primary osteoarthritis of right foot 07/13/2016    Paresthesia of right foot 07/13/2016    Mucous cyst of toe 07/13/2016    Osteopenia determined by x-ray 07/13/2016    Bunionette 08/29/2016    Urinary tract infection 10/21/2016    Osteoporosis 05/02/2017    Osteopenia 05/11/2017    Bunionette of right foot 08/16/2017    Arthritis of foot 08/16/2017    Closed nondisplaced fracture of proximal phalanx of lesser toe of left foot 08/16/2017    Other fatigue 10/02/2017    Microcytic anemia 09/12/2018    Hematochezia 09/12/2018    Iron deficiency anemia 10/23/2018    Chest pain 05/17/2022    Status post transverse rectus abdominis muscle (TRAM) flap breast reconstruction 07/14/2022    Posttraumatic hematoma of right breast 01/09/2023     Resolved Ambulatory Problems     Diagnosis Date Noted    No Resolved Ambulatory Problems     Past Medical History:   Diagnosis Date    Acid reflux     Arthritis     Breast cancer     Colon polyp 3 polyps in recent checkup    Depression     Disease of thyroid gland     Fat necrosis     H/O Left calf vein thrombosis 10/2011    History of anemia     History of colon polyps     History of fall     History of osteopenia     History of transfusion     Hypercholesteremia     Hypothyroidism     Incisional hernia     Joint pain     Meniscus tear     Sarcoidosis        PAST SURGICAL HISTORY  Past Surgical History:   Procedure Laterality Date    BREAST BIOPSY      BREAST SURGERY  2013    BILATERAL MASTECTOMY WITH LEFT BREAST AXILLARY LYMPH NODE DISSECTION    COLONOSCOPY  2013    COLONOSCOPY N/A 11/05/2018    Procedure: COLONOSCOPY to cecum  and TI:  biopsy polypectomies;  Surgeon: Briseyda Simpson MD;  Location: [facility] ENDOSCOPY;  Service: Gastroenterology    COLONOSCOPY  11/2021    ENDOSCOPY N/A 11/05/2018    Procedure:  ESOPHAGOGASTRODUODENOSCOPY with biospsies and polypectomy;  Surgeon: Briseyda Simpson MD;  Location: [facility] ENDOSCOPY;  Service: Gastroenterology    HERNIA REPAIR N/A 08/07/2015    ventral hernia repair by Dr. Brown    HYSTERECTOMY  1990    WITH BILATERA SALPINGO OOPHERECTOMY    KNEE ARTHROPLASTY Right 2011    KNEE ARTHROPLASTY Left 03/25/2016    Partial replacement    KNEE ARTHROPLASTY UNICOMPARTMENTAL Left 03/25/2016    Procedure: LT KNEE ARTHROPLASTY UNICOMPARTMENTAL;  Surgeon: Freddy Mast MD;  Location: [facility] MAIN OR;  Service:     MASTECTOMY      ORIF FINGER FRACTURE Right 2014    HAND LITTLE FINGER    RECONSTRUCTION BREAST W/ TRAM FLAP Bilateral 2014    Dr. Alex    RHINOPLASTY      TONSILLECTOMY  1953    UPPER GASTROINTESTINAL ENDOSCOPY  ?    WISDOM TOOTH EXTRACTION  1960'S    FOUR       FAMILY HISTORY  Family History   Problem Relation Age of Onset    Breast cancer Paternal Aunt 55    Deep vein thrombosis Other     Heart disease Other     Hypertension Other     Diabetes Other     Cancer Other     Kidney disease Other     Glaucoma Other     Heart failure Mother     Hypertension Mother     Colon polyps Mother     Breast cancer Maternal Aunt 65    Breast cancer Cousin     Stroke Father     Alcohol abuse Father     Colon polyps Father     Thyroid disease Sister     Depression Sister        SOCIAL HISTORY  Social History     Socioeconomic History    Marital status:      Spouse name: Irving    Number of children: 3   Tobacco Use    Smoking status: Never    Smokeless tobacco: Never    Tobacco comments:     Father smoked   Substance and Sexual Activity    Alcohol use: No    Drug use: No    Sexual activity: Not Currently     Partners: Male     Birth control/protection: Post-menopausal, Vasectomy, Hysterectomy       ALLERGIES  Hydrocodone, Oxycodone-acetaminophen, Percocet [oxycodone-acetaminophen], Sulfa antibiotics, and Penicillins    REVIEW OF SYSTEMS  Review of Systems  Included in HPI  All systems  reviewed and negative except for those discussed in HPI.    PHYSICAL EXAM    I have reviewed the triage vital signs and nursing notes.    ED Triage Vitals [06/07/25 1549]   Temp Heart Rate Resp BP SpO2   98.1 °F (36.7 °C) 90 18 -- 97 %      Temp src Heart Rate Source Patient Position BP Location FiO2 (%)   Oral Monitor -- -- --       Physical Exam  Constitutional:       General: She is not in acute distress.     Appearance: Normal appearance. She is not ill-appearing or toxic-appearing.   HENT:      Head: Normocephalic and atraumatic.      Nose: Nose normal.      Mouth/Throat:      Mouth: Mucous membranes are moist.      Pharynx: Oropharynx is clear.   Eyes:      Extraocular Movements: Extraocular movements intact.      Conjunctiva/sclera: Conjunctivae normal.      Pupils: Pupils are equal, round, and reactive to light.   Cardiovascular:      Rate and Rhythm: Normal rate and regular rhythm.      Pulses: Normal pulses.      Heart sounds: Normal heart sounds. No murmur heard.     No friction rub. No gallop.   Pulmonary:      Effort: Pulmonary effort is normal. No respiratory distress.      Breath sounds: Normal breath sounds. No stridor. No wheezing, rhonchi or rales.   Abdominal:      General: Abdomen is flat. There is no distension.      Palpations: Abdomen is soft.      Tenderness: There is no abdominal tenderness. There is no guarding or rebound.   Musculoskeletal:      Cervical back: Normal range of motion and neck supple.   Skin:     General: Skin is warm and dry.   Neurological:      General: No focal deficit present.      Mental Status: She is alert and oriented to person, place, and time. Mental status is at baseline.   Psychiatric:         Mood and Affect: Mood normal.         Behavior: Behavior normal.         Thought Content: Thought content normal.         Judgment: Judgment normal.         LAB RESULTS  Recent Results (from the past 24 hours)   Comprehensive Metabolic Panel    Collection Time: 06/07/25   5:55 PM    Specimen: Blood   Result Value Ref Range    Glucose 83 65 - 99 mg/dL    BUN 12.0 8.0 - 23.0 mg/dL    Creatinine 0.89 0.57 - 1.00 mg/dL    Sodium 137 136 - 145 mmol/L    Potassium 4.5 3.5 - 5.2 mmol/L    Chloride 102 98 - 107 mmol/L    CO2 25.0 22.0 - 29.0 mmol/L    Calcium 9.8 8.6 - 10.5 mg/dL    Total Protein 7.3 6.0 - 8.5 g/dL    Albumin 4.3 3.5 - 5.2 g/dL    ALT (SGPT) 17 1 - 33 U/L    AST (SGOT) 23 1 - 32 U/L    Alkaline Phosphatase 78 39 - 117 U/L    Total Bilirubin 0.7 0.0 - 1.2 mg/dL    Globulin 3.0 gm/dL    A/G Ratio 1.4 g/dL    BUN/Creatinine Ratio 13.5 7.0 - 25.0    Anion Gap 10.0 5.0 - 15.0 mmol/L    eGFR 68.1 >60.0 mL/min/1.73   TSH Rfx On Abnormal To Free T4    Collection Time: 06/07/25  5:55 PM    Specimen: Blood   Result Value Ref Range    TSH 2.140 0.270 - 4.200 uIU/mL   Magnesium    Collection Time: 06/07/25  5:55 PM    Specimen: Blood   Result Value Ref Range    Magnesium 2.3 1.6 - 2.4 mg/dL   Phosphorus    Collection Time: 06/07/25  5:55 PM    Specimen: Blood   Result Value Ref Range    Phosphorus 3.5 2.5 - 4.5 mg/dL   CK    Collection Time: 06/07/25  5:55 PM    Specimen: Blood   Result Value Ref Range    Creatine Kinase 131 20 - 180 U/L   BNP    Collection Time: 06/07/25  5:55 PM    Specimen: Blood   Result Value Ref Range    proBNP 48.4 0.0 - 900.0 pg/mL   High Sensitivity Troponin T    Collection Time: 06/07/25  5:55 PM    Specimen: Blood   Result Value Ref Range    HS Troponin T 8 <14 ng/L   Mononucleosis Screen    Collection Time: 06/07/25  5:55 PM    Specimen: Blood   Result Value Ref Range    Monospot Negative Negative   CBC Auto Differential    Collection Time: 06/07/25  5:55 PM    Specimen: Blood   Result Value Ref Range    WBC 5.54 3.40 - 10.80 10*3/mm3    RBC 4.60 3.77 - 5.28 10*6/mm3    Hemoglobin 13.3 12.0 - 15.9 g/dL    Hematocrit 41.4 34.0 - 46.6 %    MCV 90.0 79.0 - 97.0 fL    MCH 28.9 26.6 - 33.0 pg    MCHC 32.1 31.5 - 35.7 g/dL    RDW 13.7 12.3 - 15.4 %    RDW-SD 44.9  37.0 - 54.0 fl    MPV 11.7 6.0 - 12.0 fL    Platelets 200 140 - 450 10*3/mm3    Neutrophil % 42.4 (L) 42.7 - 76.0 %    Lymphocyte % 44.4 19.6 - 45.3 %    Monocyte % 9.4 5.0 - 12.0 %    Eosinophil % 2.7 0.3 - 6.2 %    Basophil % 0.9 0.0 - 1.5 %    Immature Grans % 0.2 0.0 - 0.5 %    Neutrophils, Absolute 2.35 1.70 - 7.00 10*3/mm3    Lymphocytes, Absolute 2.46 0.70 - 3.10 10*3/mm3    Monocytes, Absolute 0.52 0.10 - 0.90 10*3/mm3    Eosinophils, Absolute 0.15 0.00 - 0.40 10*3/mm3    Basophils, Absolute 0.05 0.00 - 0.20 10*3/mm3    Immature Grans, Absolute 0.01 0.00 - 0.05 10*3/mm3    nRBC 0.0 0.0 - 0.2 /100 WBC   Urinalysis With Culture If Indicated - Urine, Clean Catch    Collection Time: 06/07/25  5:56 PM    Specimen: Urine, Clean Catch   Result Value Ref Range    Color, UA Yellow Yellow, Straw    Appearance, UA Clear Clear    pH, UA 5.5 5.0 - 8.0    Specific Gravity, UA <=1.005 1.005 - 1.030    Glucose, UA Negative Negative    Ketones, UA Trace (A) Negative    Bilirubin, UA Negative Negative    Blood, UA Negative Negative    Protein, UA Negative Negative    Leuk Esterase, UA Negative Negative    Nitrite, UA Negative Negative    Urobilinogen, UA 0.2 E.U./dL 0.2 - 1.0 E.U./dL   ECG 12 Lead Chest Pain    Collection Time: 06/07/25  6:08 PM   Result Value Ref Range    QT Interval 424 ms    QTC Interval 449 ms   High Sensitivity Troponin T 1Hr    Collection Time: 06/07/25  7:06 PM    Specimen: Blood   Result Value Ref Range    HS Troponin T 7 <14 ng/L    Troponin T Numeric Delta -1 Abnormal if >/=3 ng/L       RADIOLOGY  XR Chest 1 View  Result Date: 6/7/2025  XR CHEST 1 VW-  DATE OF EXAM: 6/7/2025 5:36 PM  INDICATION: chest pain.  COMPARISON: CT 12/19/2024. Radiographs 12/21/2022 and 5/17/2022.  TECHNIQUE: A single portable AP view of the chest was obtained.  FINDINGS: Lordotic positioning. Mild linear bibasilar subsegmental atelectasis and/or scarring. Lungs otherwise clear. No pneumothorax. Cardiomediastinal contours  within normal limits. No acute osseous abnormality is identified.      Mild linear bibasilar subsegmental atelectasis and/or scarring.  This report was finalized on 6/7/2025 5:45 PM by Bartolo Carney MD on Workstation: [id number]        MEDICATIONS GIVEN IN ER  Medications - No data to display    ORDERS PLACED DURING THIS VISIT:  Orders Placed This Encounter   Procedures    XR Chest 1 View    Comprehensive Metabolic Panel    TSH Rfx On Abnormal To Free T4    Magnesium    Phosphorus    CK    Urinalysis With Culture If Indicated - Urine, Clean Catch    BNP    High Sensitivity Troponin T    Ehrlichia Profile DNA PCR    Rickettsia Species DNA, Real-Time PCR    Lyme Disease Total Antibody With Reflex to Immunoassay    Mononucleosis Screen    CBC Auto Differential    High Sensitivity Troponin T 1Hr    ECG 12 Lead Chest Pain    CBC & Differential       OUTPATIENT MEDICATION MANAGEMENT:  No current Epic-ordered facility-administered medications on file.     Current Outpatient Medications Ordered in Epic   Medication Sig Dispense Refill    buPROPion XL (WELLBUTRIN XL) 300 MG 24 hr tablet Take 450 mg by mouth Daily.      Calcium Carb-Cholecalciferol 600-500 MG-UNIT capsule Take  by mouth. HOLD PRIOR TO SURGERY      Cholecalciferol (VITAMIN D3) 1000 UNITS capsule Take  by mouth daily.      CRANBERRY-D MANNOSE PO Take  by mouth.      DULoxetine (CYMBALTA) 60 MG capsule Take 1 capsule by mouth Daily.      EPIPEN 2-DON 0.3 MG/0.3ML solution auto-injector injection TAKE AS NEEDED FOR SYNCOPE OR SHORTNESS OF AIR (Patient not taking: Reported on 3/5/2025) 2 each 0    esomeprazole (nexIUM) 20 MG capsule Take 1 capsule by mouth.      famotidine (PEPCID) 20 MG tablet Take 1 tablet by mouth 2 (Two) Times a Day. (Patient not taking: Reported on 3/5/2025)      gabapentin (NEURONTIN) 100 MG capsule Take 1 capsule by mouth 3 (Three) Times a Day.      levothyroxine (SYNTHROID, LEVOTHROID) 100 MCG tablet Take 1 tablet by mouth Daily.  3     lovastatin (MEVACOR) 40 MG tablet TAKE 1 TABLET BY MOUTH EVERY NIGHT 90 tablet 0    Multiple Vitamins-Minerals (CENTRUM SILVER PO) Take  by mouth.      SHINGRIX 50 MCG/0.5ML reconstituted suspension ADM 0.5ML IM UTD (Patient not taking: Reported on 3/5/2025)  0       PROCEDURES  Procedures    PROGRESS, DATA ANALYSIS, CONSULTS, AND MEDICAL DECISION MAKING  All labs have been independently interpreted by me.  All radiology studies have been reviewed by me. All EKG's have been independently viewed and interpreted by me.  Discussion below represents my analysis of pertinent findings related to patient's condition, differential diagnosis, treatment plan and final disposition.    Differential diagnosis includes but is not limited to dehydration, UTI, chronic fatigue, tickborne illness, rhabdomyolysis, hypothyroidism.    Clinical Scores: HEART Score: 3                   ED Course as of 06/07/25 1957   Sat Jun 07, 2025 1808 XR Chest 1 View  My independent interpretation of the imaging study is no lobar infiltrate or pneumothorax [AB]   1815 EKG interpreted by myself  Time: 1808  Rate: 67  Rhythm: NSR  No ST elevation or depression  Normal intervals  Normal morphology [AB]   1816 Urinalysis With Culture If Indicated - Urine, Clean Catch(!) [AB]   1817 WBC: 5.54 [AB]   1817 Hemoglobin: 13.3 [AB]   1834 Creatine Kinase: 131 [AB]   1835 Phosphorus: 3.5 [AB]   1835 Magnesium: 2.3 [AB]   1835 Monospot: Negative [AB]   1835 Creatinine: 0.89 [AB]   1835 BUN: 12.0 [AB]   1901 proBNP: 48.4 [AB]   1901 TSH Baseline: 2.140 [AB]   1954 Updated patient on results.  Very broad workup for patient's fatigue was unremarkable.  No evidence of cardiac ischemia.  No evidence of UTI, electrolyte derangement, rhabdomyolysis or hypothyroidism.  I did send a tick panel which can be followed up by the patient.  Patient also expressed concerns that her symptoms could be due to overmedication as she is on multiple antidepressants.  Encouraged her to  bring these concerns up with her PCP to talk about tapering off of medications as warranted.  Also encouraged her to request as much as she needs and reduce other social obligations if she is not feeling up to them.  Patient agreeable with plan and discharge. [AB]      ED Course User Index  [AB] Aime Vick MD             AS OF 19:57 EDT VITALS:    BP - 122/86  HR - 82  TEMP - 97.9 °F (36.6 °C) (Oral)  O2 SATS - 99%    COMPLEXITY OF CARE  Admission was considered but after careful review of the patient's presentation, physical examination, diagnostic results, and response to treatment the patient may be safely discharged with outpatient follow-up.      DIAGNOSIS  Final diagnoses:   Other fatigue   Atypical chest pain         DISPOSITION  ED Disposition       ED Disposition   Discharge    Condition   Stable    Comment   --                Please note that portions of this document were completed with a voice recognition program.    Note Disclaimer: At [facility], we believe that sharing information builds trust and better relationships. You are receiving this note because you recently visited [facility]. It is possible you will see health information before a provider has talked with you about it. This kind of information can be easy to misunderstand. To help you fully understand what it means for your health, we urge you to discuss this note with your provider.         Aime Vick MD  06/07/25 1957

## 2025-06-08 LAB
QT INTERVAL: 424 MS
QTC INTERVAL: 449 MS

## 2025-06-10 LAB — B BURGDOR IGG+IGM SER QL IA: NEGATIVE

## 2025-06-12 LAB
A PHAGOCYTOPH DNA BLD QL NAA+PROBE: NEGATIVE
EHRLICHIA DNA SPEC QL NAA+PROBE: NEGATIVE

## 2025-06-13 LAB — RICKETTSIA RICKETTSII DNA, RT: NOT DETECTED

## 2025-07-22 ENCOUNTER — TELEPHONE (OUTPATIENT)
Dept: ONCOLOGY | Facility: CLINIC | Age: 75
End: 2025-07-22

## 2025-08-01 ENCOUNTER — HOSPITAL ENCOUNTER (OUTPATIENT)
Dept: MAMMOGRAPHY | Facility: HOSPITAL | Age: 75
Discharge: HOME | End: 2025-08-01
Admitting: INTERNAL MEDICINE
Payer: MEDICARE

## 2025-08-01 DIAGNOSIS — Z12.31 SCREENING MAMMOGRAM FOR BREAST CANCER: ICD-10-CM

## 2025-08-01 PROCEDURE — 77067 SCR MAMMO BI INCL CAD: CPT

## 2025-08-01 PROCEDURE — 77063 BREAST TOMOSYNTHESIS BI: CPT

## (undated) DEVICE — BITEBLOCK OMNI BLOC

## (undated) DEVICE — Device: Brand: DEFENDO AIR/WATER/SUCTION AND BIOPSY VALVE

## (undated) DEVICE — FRCP BX RADJAW4 NDL 2.8 240CM LG OG BX40

## (undated) DEVICE — CANN NASL CO2 TRULINK W/O2 A/

## (undated) DEVICE — TUBING, SUCTION, 1/4" X 10', STRAIGHT: Brand: MEDLINE

## (undated) DEVICE — SINGLE-USE BIOPSY FORCEPS: Brand: RADIAL JAW 4

## (undated) DEVICE — THE TORRENT IRRIGATION SCOPE CONNECTOR IS USED WITH THE TORRENT IRRIGATION TUBING TO PROVIDE IRRIGATION FLUIDS SUCH AS STERILE WATER DURING GASTROINTESTINAL ENDOSCOPIC PROCEDURES WHEN USED IN CONJUNCTION WITH AN IRRIGATION PUMP (OR ELECTROSURGICAL UNIT).: Brand: TORRENT